# Patient Record
Sex: FEMALE | Race: BLACK OR AFRICAN AMERICAN | Employment: OTHER | URBAN - METROPOLITAN AREA
[De-identification: names, ages, dates, MRNs, and addresses within clinical notes are randomized per-mention and may not be internally consistent; named-entity substitution may affect disease eponyms.]

---

## 2017-02-21 ENCOUNTER — OFFICE VISIT (OUTPATIENT)
Dept: INTERNAL MEDICINE CLINIC | Age: 82
End: 2017-02-21

## 2017-02-21 VITALS
BODY MASS INDEX: 23.78 KG/M2 | DIASTOLIC BLOOD PRESSURE: 74 MMHG | HEIGHT: 66 IN | OXYGEN SATURATION: 97 % | HEART RATE: 84 BPM | SYSTOLIC BLOOD PRESSURE: 120 MMHG | WEIGHT: 148 LBS

## 2017-02-21 DIAGNOSIS — E55.9 VITAMIN D DEFICIENCY: ICD-10-CM

## 2017-02-21 DIAGNOSIS — J30.89 OTHER ALLERGIC RHINITIS: ICD-10-CM

## 2017-02-21 DIAGNOSIS — D69.6 THROMBOCYTOPENIA (HCC): ICD-10-CM

## 2017-02-21 DIAGNOSIS — R43.1 ABNORMAL SMELL: ICD-10-CM

## 2017-02-21 DIAGNOSIS — E78.2 MIXED HYPERLIPIDEMIA: ICD-10-CM

## 2017-02-21 DIAGNOSIS — I10 HYPERTENSION GOAL BP (BLOOD PRESSURE) < 130/80: Primary | ICD-10-CM

## 2017-02-21 PROCEDURE — G8427 DOCREV CUR MEDS BY ELIG CLIN: HCPCS | Performed by: INTERNAL MEDICINE

## 2017-02-21 PROCEDURE — 1036F TOBACCO NON-USER: CPT | Performed by: INTERNAL MEDICINE

## 2017-02-21 PROCEDURE — 1090F PRES/ABSN URINE INCON ASSESS: CPT | Performed by: INTERNAL MEDICINE

## 2017-02-21 PROCEDURE — G8420 CALC BMI NORM PARAMETERS: HCPCS | Performed by: INTERNAL MEDICINE

## 2017-02-21 PROCEDURE — G8484 FLU IMMUNIZE NO ADMIN: HCPCS | Performed by: INTERNAL MEDICINE

## 2017-02-21 PROCEDURE — G0444 DEPRESSION SCREEN ANNUAL: HCPCS | Performed by: INTERNAL MEDICINE

## 2017-02-21 PROCEDURE — 99214 OFFICE O/P EST MOD 30 MIN: CPT | Performed by: INTERNAL MEDICINE

## 2017-02-21 PROCEDURE — 1123F ACP DISCUSS/DSCN MKR DOCD: CPT | Performed by: INTERNAL MEDICINE

## 2017-02-21 PROCEDURE — 4040F PNEUMOC VAC/ADMIN/RCVD: CPT | Performed by: INTERNAL MEDICINE

## 2017-02-21 PROCEDURE — G8400 PT W/DXA NO RESULTS DOC: HCPCS | Performed by: INTERNAL MEDICINE

## 2017-02-21 RX ORDER — PRAVASTATIN SODIUM 40 MG
40 TABLET ORAL NIGHTLY
Qty: 90 TABLET | Refills: 0 | Status: SHIPPED | OUTPATIENT
Start: 2017-02-21 | End: 2017-05-23 | Stop reason: SDUPTHER

## 2017-02-21 RX ORDER — MULTIVIT-MIN/IRON/FOLIC ACID/K 18-600-40
1 CAPSULE ORAL DAILY
Qty: 90 CAPSULE | Refills: 0 | COMMUNITY
Start: 2017-02-21 | End: 2017-05-23 | Stop reason: SDUPTHER

## 2017-02-21 RX ORDER — FLUTICASONE PROPIONATE 50 MCG
2 SPRAY, SUSPENSION (ML) NASAL DAILY PRN
Qty: 1 BOTTLE | Refills: 3 | Status: SHIPPED | OUTPATIENT
Start: 2017-02-21 | End: 2018-03-09 | Stop reason: SDUPTHER

## 2017-02-21 ASSESSMENT — PATIENT HEALTH QUESTIONNAIRE - PHQ9
4. FEELING TIRED OR HAVING LITTLE ENERGY: 1
SUM OF ALL RESPONSES TO PHQ QUESTIONS 1-9: 12
8. MOVING OR SPEAKING SO SLOWLY THAT OTHER PEOPLE COULD HAVE NOTICED. OR THE OPPOSITE, BEING SO FIGETY OR RESTLESS THAT YOU HAVE BEEN MOVING AROUND A LOT MORE THAN USUAL: 1
1. LITTLE INTEREST OR PLEASURE IN DOING THINGS: 3
3. TROUBLE FALLING OR STAYING ASLEEP: 1
7. TROUBLE CONCENTRATING ON THINGS, SUCH AS READING THE NEWSPAPER OR WATCHING TELEVISION: 1
6. FEELING BAD ABOUT YOURSELF - OR THAT YOU ARE A FAILURE OR HAVE LET YOURSELF OR YOUR FAMILY DOWN: 1
2. FEELING DOWN, DEPRESSED OR HOPELESS: 3
5. POOR APPETITE OR OVEREATING: 1
10. IF YOU CHECKED OFF ANY PROBLEMS, HOW DIFFICULT HAVE THESE PROBLEMS MADE IT FOR YOU TO DO YOUR WORK, TAKE CARE OF THINGS AT HOME, OR GET ALONG WITH OTHER PEOPLE: 1
SUM OF ALL RESPONSES TO PHQ9 QUESTIONS 1 & 2: 6
9. THOUGHTS THAT YOU WOULD BE BETTER OFF DEAD, OR OF HURTING YOURSELF: 0

## 2017-05-15 DIAGNOSIS — E78.2 MIXED HYPERLIPIDEMIA: ICD-10-CM

## 2017-05-15 DIAGNOSIS — I10 HYPERTENSION GOAL BP (BLOOD PRESSURE) < 130/80: ICD-10-CM

## 2017-05-15 DIAGNOSIS — D69.6 THROMBOCYTOPENIA (HCC): ICD-10-CM

## 2017-05-15 LAB
ALBUMIN SERPL-MCNC: 4.3 G/DL (ref 3.4–5)
ALP BLD-CCNC: 77 U/L (ref 40–129)
ALT SERPL-CCNC: 12 U/L (ref 10–40)
ANION GAP SERPL CALCULATED.3IONS-SCNC: 15 MMOL/L (ref 3–16)
AST SERPL-CCNC: 17 U/L (ref 15–37)
BASOPHILS ABSOLUTE: 0 K/UL (ref 0–0.2)
BASOPHILS RELATIVE PERCENT: 0.3 %
BILIRUB SERPL-MCNC: 0.7 MG/DL (ref 0–1)
BILIRUBIN DIRECT: <0.2 MG/DL (ref 0–0.3)
BILIRUBIN, INDIRECT: ABNORMAL MG/DL (ref 0–1)
BUN BLDV-MCNC: 13 MG/DL (ref 7–20)
CALCIUM SERPL-MCNC: 9.1 MG/DL (ref 8.3–10.6)
CHLORIDE BLD-SCNC: 104 MMOL/L (ref 99–110)
CHOLESTEROL, TOTAL: 173 MG/DL (ref 0–199)
CO2: 25 MMOL/L (ref 21–32)
CREAT SERPL-MCNC: 1 MG/DL (ref 0.6–1.2)
EOSINOPHILS ABSOLUTE: 0.1 K/UL (ref 0–0.6)
EOSINOPHILS RELATIVE PERCENT: 1.6 %
GFR AFRICAN AMERICAN: >60
GFR NON-AFRICAN AMERICAN: 53
GLUCOSE BLD-MCNC: 93 MG/DL (ref 70–99)
HCT VFR BLD CALC: 39.4 % (ref 36–48)
HDLC SERPL-MCNC: 60 MG/DL (ref 40–60)
HEMOGLOBIN: 12.2 G/DL (ref 12–16)
LDL CHOLESTEROL CALCULATED: 104 MG/DL
LYMPHOCYTES ABSOLUTE: 1.2 K/UL (ref 1–5.1)
LYMPHOCYTES RELATIVE PERCENT: 28.5 %
MCH RBC QN AUTO: 25.6 PG (ref 26–34)
MCHC RBC AUTO-ENTMCNC: 31 G/DL (ref 31–36)
MCV RBC AUTO: 82.7 FL (ref 80–100)
MONOCYTES ABSOLUTE: 0.3 K/UL (ref 0–1.3)
MONOCYTES RELATIVE PERCENT: 8.4 %
NEUTROPHILS ABSOLUTE: 2.5 K/UL (ref 1.7–7.7)
NEUTROPHILS RELATIVE PERCENT: 61.2 %
PDW BLD-RTO: 14.5 % (ref 12.4–15.4)
PLATELET # BLD: 107 K/UL (ref 135–450)
PMV BLD AUTO: 11 FL (ref 5–10.5)
POTASSIUM SERPL-SCNC: 4.4 MMOL/L (ref 3.5–5.1)
RBC # BLD: 4.76 M/UL (ref 4–5.2)
SODIUM BLD-SCNC: 144 MMOL/L (ref 136–145)
TOTAL PROTEIN: 6.3 G/DL (ref 6.4–8.2)
TRIGL SERPL-MCNC: 47 MG/DL (ref 0–150)
VLDLC SERPL CALC-MCNC: 9 MG/DL
WBC # BLD: 4 K/UL (ref 4–11)

## 2017-05-23 ENCOUNTER — OFFICE VISIT (OUTPATIENT)
Dept: INTERNAL MEDICINE CLINIC | Age: 82
End: 2017-05-23

## 2017-05-23 VITALS
BODY MASS INDEX: 24.59 KG/M2 | SYSTOLIC BLOOD PRESSURE: 130 MMHG | HEIGHT: 66 IN | HEART RATE: 67 BPM | OXYGEN SATURATION: 98 % | DIASTOLIC BLOOD PRESSURE: 80 MMHG | WEIGHT: 153 LBS

## 2017-05-23 DIAGNOSIS — E78.49 OTHER HYPERLIPIDEMIA: ICD-10-CM

## 2017-05-23 DIAGNOSIS — J30.89 OTHER ALLERGIC RHINITIS: ICD-10-CM

## 2017-05-23 DIAGNOSIS — R53.83 OTHER FATIGUE: ICD-10-CM

## 2017-05-23 DIAGNOSIS — D69.6 THROMBOCYTOPENIA (HCC): ICD-10-CM

## 2017-05-23 DIAGNOSIS — E55.9 VITAMIN D DEFICIENCY: ICD-10-CM

## 2017-05-23 DIAGNOSIS — I10 HYPERTENSION GOAL BP (BLOOD PRESSURE) < 130/80: Primary | ICD-10-CM

## 2017-05-23 DIAGNOSIS — R39.15 URINARY URGENCY: ICD-10-CM

## 2017-05-23 DIAGNOSIS — R42 DIZZINESS: ICD-10-CM

## 2017-05-23 LAB
BILIRUBIN, POC: NORMAL
BLOOD URINE, POC: NORMAL
CLARITY, POC: CLEAR
COLOR, POC: YELLOW
GLUCOSE URINE, POC: NORMAL
KETONES, POC: NORMAL
LEUKOCYTE EST, POC: NORMAL
NITRITE, POC: NORMAL
PH, POC: 6
PROTEIN, POC: NORMAL
SPECIFIC GRAVITY, POC: 1.02
UROBILINOGEN, POC: 0.2

## 2017-05-23 PROCEDURE — 81002 URINALYSIS NONAUTO W/O SCOPE: CPT | Performed by: INTERNAL MEDICINE

## 2017-05-23 PROCEDURE — G8427 DOCREV CUR MEDS BY ELIG CLIN: HCPCS | Performed by: INTERNAL MEDICINE

## 2017-05-23 PROCEDURE — 4040F PNEUMOC VAC/ADMIN/RCVD: CPT | Performed by: INTERNAL MEDICINE

## 2017-05-23 PROCEDURE — 1036F TOBACCO NON-USER: CPT | Performed by: INTERNAL MEDICINE

## 2017-05-23 PROCEDURE — G8400 PT W/DXA NO RESULTS DOC: HCPCS | Performed by: INTERNAL MEDICINE

## 2017-05-23 PROCEDURE — G8420 CALC BMI NORM PARAMETERS: HCPCS | Performed by: INTERNAL MEDICINE

## 2017-05-23 PROCEDURE — 1090F PRES/ABSN URINE INCON ASSESS: CPT | Performed by: INTERNAL MEDICINE

## 2017-05-23 PROCEDURE — 1123F ACP DISCUSS/DSCN MKR DOCD: CPT | Performed by: INTERNAL MEDICINE

## 2017-05-23 PROCEDURE — 99214 OFFICE O/P EST MOD 30 MIN: CPT | Performed by: INTERNAL MEDICINE

## 2017-05-23 RX ORDER — PRAVASTATIN SODIUM 40 MG
40 TABLET ORAL NIGHTLY
Qty: 90 TABLET | Refills: 0 | Status: SHIPPED | OUTPATIENT
Start: 2017-05-23 | End: 2017-08-02 | Stop reason: SDUPTHER

## 2017-05-23 RX ORDER — AMLODIPINE BESYLATE 10 MG/1
10 TABLET ORAL DAILY
Qty: 90 TABLET | Refills: 0 | Status: SHIPPED | OUTPATIENT
Start: 2017-05-23 | End: 2017-08-02 | Stop reason: SDUPTHER

## 2017-05-23 RX ORDER — VALSARTAN 320 MG/1
320 TABLET ORAL DAILY
Qty: 90 TABLET | Refills: 0 | Status: SHIPPED | OUTPATIENT
Start: 2017-05-23 | End: 2017-08-02 | Stop reason: SDUPTHER

## 2017-05-23 RX ORDER — MULTIVIT-MIN/IRON/FOLIC ACID/K 18-600-40
1 CAPSULE ORAL DAILY
Qty: 90 CAPSULE | Refills: 0 | COMMUNITY
Start: 2017-05-23 | End: 2017-08-02 | Stop reason: SDUPTHER

## 2017-08-02 ENCOUNTER — OFFICE VISIT (OUTPATIENT)
Dept: INTERNAL MEDICINE CLINIC | Age: 82
End: 2017-08-02

## 2017-08-02 VITALS
DIASTOLIC BLOOD PRESSURE: 80 MMHG | OXYGEN SATURATION: 98 % | BODY MASS INDEX: 25.07 KG/M2 | SYSTOLIC BLOOD PRESSURE: 130 MMHG | HEIGHT: 66 IN | HEART RATE: 80 BPM | TEMPERATURE: 98.8 F | WEIGHT: 156 LBS

## 2017-08-02 DIAGNOSIS — E78.5 HYPERLIPIDEMIA LDL GOAL <100: ICD-10-CM

## 2017-08-02 DIAGNOSIS — R91.1 PULMONARY NODULE: ICD-10-CM

## 2017-08-02 DIAGNOSIS — J30.89 ALLERGIC RHINITIS DUE TO OTHER ALLERGEN: ICD-10-CM

## 2017-08-02 DIAGNOSIS — R42 DIZZINESS: ICD-10-CM

## 2017-08-02 DIAGNOSIS — I10 ESSENTIAL HYPERTENSION: Primary | ICD-10-CM

## 2017-08-02 DIAGNOSIS — R53.83 FATIGUE, UNSPECIFIED TYPE: ICD-10-CM

## 2017-08-02 DIAGNOSIS — D69.6 THROMBOCYTOPENIA (HCC): ICD-10-CM

## 2017-08-02 DIAGNOSIS — E55.9 VITAMIN D DEFICIENCY: ICD-10-CM

## 2017-08-02 DIAGNOSIS — M54.2 NECK PAIN: ICD-10-CM

## 2017-08-02 LAB
ANION GAP SERPL CALCULATED.3IONS-SCNC: 14 MMOL/L (ref 3–16)
BILIRUBIN URINE: NEGATIVE
BLOOD, URINE: NEGATIVE
BUN BLDV-MCNC: 10 MG/DL (ref 7–20)
CALCIUM SERPL-MCNC: 9.4 MG/DL (ref 8.3–10.6)
CHLORIDE BLD-SCNC: 105 MMOL/L (ref 99–110)
CLARITY: CLEAR
CO2: 25 MMOL/L (ref 21–32)
COLOR: YELLOW
CREAT SERPL-MCNC: 0.8 MG/DL (ref 0.6–1.2)
FOLATE: 14.63 NG/ML (ref 4.78–24.2)
GFR AFRICAN AMERICAN: >60
GFR NON-AFRICAN AMERICAN: >60
GLUCOSE BLD-MCNC: 92 MG/DL (ref 70–99)
GLUCOSE URINE: NEGATIVE MG/DL
HCT VFR BLD CALC: 39.1 % (ref 36–48)
HEMOGLOBIN: 12.5 G/DL (ref 12–16)
KETONES, URINE: NEGATIVE MG/DL
LEUKOCYTE ESTERASE, URINE: NEGATIVE
MCH RBC QN AUTO: 26.5 PG (ref 26–34)
MCHC RBC AUTO-ENTMCNC: 32 G/DL (ref 31–36)
MCV RBC AUTO: 82.9 FL (ref 80–100)
MICROSCOPIC EXAMINATION: NORMAL
NITRITE, URINE: NEGATIVE
PDW BLD-RTO: 14.6 % (ref 12.4–15.4)
PH UA: 5.5
PLATELET # BLD: 112 K/UL (ref 135–450)
PMV BLD AUTO: 10.8 FL (ref 5–10.5)
POTASSIUM SERPL-SCNC: 4.6 MMOL/L (ref 3.5–5.1)
PROTEIN UA: NEGATIVE MG/DL
RBC # BLD: 4.71 M/UL (ref 4–5.2)
SODIUM BLD-SCNC: 144 MMOL/L (ref 136–145)
SPECIFIC GRAVITY UA: 1.02
TSH REFLEX: 1.29 UIU/ML (ref 0.27–4.2)
URINE TYPE: NORMAL
UROBILINOGEN, URINE: 0.2 E.U./DL
VITAMIN B-12: 729 PG/ML (ref 211–911)
VITAMIN D 25-HYDROXY: 36.3 NG/ML
WBC # BLD: 5.2 K/UL (ref 4–11)

## 2017-08-02 PROCEDURE — 1036F TOBACCO NON-USER: CPT | Performed by: INTERNAL MEDICINE

## 2017-08-02 PROCEDURE — 1090F PRES/ABSN URINE INCON ASSESS: CPT | Performed by: INTERNAL MEDICINE

## 2017-08-02 PROCEDURE — G8427 DOCREV CUR MEDS BY ELIG CLIN: HCPCS | Performed by: INTERNAL MEDICINE

## 2017-08-02 PROCEDURE — 1123F ACP DISCUSS/DSCN MKR DOCD: CPT | Performed by: INTERNAL MEDICINE

## 2017-08-02 PROCEDURE — G8400 PT W/DXA NO RESULTS DOC: HCPCS | Performed by: INTERNAL MEDICINE

## 2017-08-02 PROCEDURE — 4040F PNEUMOC VAC/ADMIN/RCVD: CPT | Performed by: INTERNAL MEDICINE

## 2017-08-02 PROCEDURE — G8419 CALC BMI OUT NRM PARAM NOF/U: HCPCS | Performed by: INTERNAL MEDICINE

## 2017-08-02 PROCEDURE — 99214 OFFICE O/P EST MOD 30 MIN: CPT | Performed by: INTERNAL MEDICINE

## 2017-08-02 RX ORDER — AMLODIPINE BESYLATE 10 MG/1
10 TABLET ORAL DAILY
Qty: 90 TABLET | Refills: 0 | Status: ON HOLD | OUTPATIENT
Start: 2017-08-02 | End: 2017-10-24

## 2017-08-02 RX ORDER — NAPROXEN 500 MG/1
500 TABLET ORAL 2 TIMES DAILY PRN
Qty: 60 TABLET | Refills: 0 | Status: SHIPPED | OUTPATIENT
Start: 2017-08-02 | End: 2018-09-29 | Stop reason: CLARIF

## 2017-08-02 RX ORDER — MULTIVIT-MIN/IRON/FOLIC ACID/K 18-600-40
1 CAPSULE ORAL DAILY
Qty: 90 CAPSULE | Refills: 0 | Status: SHIPPED | OUTPATIENT
Start: 2017-08-02 | End: 2017-11-28 | Stop reason: SDUPTHER

## 2017-08-02 RX ORDER — PRAVASTATIN SODIUM 40 MG
40 TABLET ORAL NIGHTLY
Qty: 90 TABLET | Refills: 0 | Status: SHIPPED | OUTPATIENT
Start: 2017-08-02 | End: 2017-11-28 | Stop reason: SDUPTHER

## 2017-08-02 RX ORDER — MECLIZINE HCL 12.5 MG/1
12.5 TABLET ORAL 3 TIMES DAILY PRN
Qty: 30 TABLET | Refills: 0 | Status: SHIPPED | OUTPATIENT
Start: 2017-08-02 | End: 2017-08-24 | Stop reason: DRUGHIGH

## 2017-08-02 RX ORDER — VALSARTAN 320 MG/1
320 TABLET ORAL DAILY
Qty: 90 TABLET | Refills: 0 | Status: ON HOLD | OUTPATIENT
Start: 2017-08-02 | End: 2017-10-24 | Stop reason: HOSPADM

## 2017-08-24 ENCOUNTER — OFFICE VISIT (OUTPATIENT)
Dept: INTERNAL MEDICINE CLINIC | Age: 82
End: 2017-08-24

## 2017-08-24 VITALS
BODY MASS INDEX: 23.78 KG/M2 | HEIGHT: 66 IN | DIASTOLIC BLOOD PRESSURE: 78 MMHG | HEART RATE: 73 BPM | WEIGHT: 148 LBS | SYSTOLIC BLOOD PRESSURE: 130 MMHG | TEMPERATURE: 98.4 F | OXYGEN SATURATION: 98 %

## 2017-08-24 DIAGNOSIS — E78.5 HYPERLIPIDEMIA LDL GOAL <100: ICD-10-CM

## 2017-08-24 DIAGNOSIS — E55.9 VITAMIN D DEFICIENCY: ICD-10-CM

## 2017-08-24 DIAGNOSIS — I10 ESSENTIAL HYPERTENSION: Primary | ICD-10-CM

## 2017-08-24 DIAGNOSIS — R42 DIZZINESS: ICD-10-CM

## 2017-08-24 DIAGNOSIS — R53.83 FATIGUE, UNSPECIFIED TYPE: ICD-10-CM

## 2017-08-24 DIAGNOSIS — D69.6 THROMBOCYTOPENIA (HCC): ICD-10-CM

## 2017-08-24 DIAGNOSIS — R91.1 PULMONARY NODULE: ICD-10-CM

## 2017-08-24 DIAGNOSIS — M54.2 NECK PAIN: ICD-10-CM

## 2017-08-24 PROCEDURE — 4040F PNEUMOC VAC/ADMIN/RCVD: CPT | Performed by: INTERNAL MEDICINE

## 2017-08-24 PROCEDURE — 1090F PRES/ABSN URINE INCON ASSESS: CPT | Performed by: INTERNAL MEDICINE

## 2017-08-24 PROCEDURE — G8427 DOCREV CUR MEDS BY ELIG CLIN: HCPCS | Performed by: INTERNAL MEDICINE

## 2017-08-24 PROCEDURE — G8420 CALC BMI NORM PARAMETERS: HCPCS | Performed by: INTERNAL MEDICINE

## 2017-08-24 PROCEDURE — G8400 PT W/DXA NO RESULTS DOC: HCPCS | Performed by: INTERNAL MEDICINE

## 2017-08-24 PROCEDURE — 1123F ACP DISCUSS/DSCN MKR DOCD: CPT | Performed by: INTERNAL MEDICINE

## 2017-08-24 PROCEDURE — 99214 OFFICE O/P EST MOD 30 MIN: CPT | Performed by: INTERNAL MEDICINE

## 2017-08-24 PROCEDURE — 1036F TOBACCO NON-USER: CPT | Performed by: INTERNAL MEDICINE

## 2017-08-24 RX ORDER — MECLIZINE HYDROCHLORIDE 25 MG/1
25 TABLET ORAL 3 TIMES DAILY PRN
Qty: 30 TABLET | Refills: 0 | Status: SHIPPED | OUTPATIENT
Start: 2017-08-24 | End: 2018-06-12

## 2017-09-06 ENCOUNTER — HOSPITAL ENCOUNTER (OUTPATIENT)
Dept: CT IMAGING | Age: 82
Discharge: HOME | End: 2017-09-06
Attending: INTERNAL MEDICINE | Admitting: INTERNAL MEDICINE

## 2017-09-06 DIAGNOSIS — R91.1 SOLITARY PULMONARY NODULE: ICD-10-CM

## 2017-09-06 DIAGNOSIS — R91.1 PULMONARY NODULE: ICD-10-CM

## 2017-10-22 PROBLEM — R42 VERTIGO: Status: ACTIVE | Noted: 2017-10-22
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
## 2017-10-23 PROBLEM — R26.89 LOSS OF BALANCE: Status: ACTIVE | Noted: 2017-10-23

## 2017-10-25 ENCOUNTER — TELEPHONE (OUTPATIENT)
Dept: INTERNAL MEDICINE CLINIC | Age: 82
End: 2017-10-25

## 2017-10-25 NOTE — TELEPHONE ENCOUNTER
CALLED AND TO CHECK UP ON PT. S/P HOSPITALIZATION  STATES DIZZINESS IMPROVING  ADVISED F/U POST HOSPITALIZATION VISIT  PT VERBALIZED UNDERSTANDING

## 2017-11-28 ENCOUNTER — OFFICE VISIT (OUTPATIENT)
Dept: INTERNAL MEDICINE CLINIC | Age: 82
End: 2017-11-28

## 2017-11-28 VITALS
HEIGHT: 66 IN | SYSTOLIC BLOOD PRESSURE: 130 MMHG | DIASTOLIC BLOOD PRESSURE: 80 MMHG | TEMPERATURE: 97.6 F | BODY MASS INDEX: 24.27 KG/M2 | HEART RATE: 78 BPM | WEIGHT: 151 LBS

## 2017-11-28 DIAGNOSIS — E78.49 OTHER HYPERLIPIDEMIA: ICD-10-CM

## 2017-11-28 DIAGNOSIS — E55.9 VITAMIN D DEFICIENCY: ICD-10-CM

## 2017-11-28 DIAGNOSIS — R91.1 PULMONARY NODULE: ICD-10-CM

## 2017-11-28 DIAGNOSIS — M54.2 NECK PAIN: ICD-10-CM

## 2017-11-28 DIAGNOSIS — R39.89 ABNORMAL URINE COLOR: ICD-10-CM

## 2017-11-28 DIAGNOSIS — D69.6 THROMBOCYTOPENIA (HCC): ICD-10-CM

## 2017-11-28 DIAGNOSIS — Z23 NEED FOR IMMUNIZATION AGAINST INFLUENZA: ICD-10-CM

## 2017-11-28 DIAGNOSIS — M79.89 LEG SWELLING: ICD-10-CM

## 2017-11-28 DIAGNOSIS — K80.20 GALLSTONES: ICD-10-CM

## 2017-11-28 DIAGNOSIS — I10 ESSENTIAL HYPERTENSION: Primary | ICD-10-CM

## 2017-11-28 LAB
BILIRUBIN URINE: NEGATIVE
BILIRUBIN, POC: NORMAL
BLOOD URINE, POC: NORMAL
BLOOD, URINE: NEGATIVE
CLARITY, POC: NORMAL
CLARITY: CLEAR
COLOR, POC: YELLOW
COLOR: YELLOW
GLUCOSE URINE, POC: NEGATIVE
GLUCOSE URINE: NEGATIVE MG/DL
KETONES, POC: NEGATIVE
KETONES, URINE: NEGATIVE MG/DL
LEUKOCYTE EST, POC: NEGATIVE
LEUKOCYTE ESTERASE, URINE: NEGATIVE
MICROSCOPIC EXAMINATION: NORMAL
NITRITE, POC: NEGATIVE
NITRITE, URINE: NEGATIVE
PH UA: 5.5
PH, POC: 5
PROTEIN UA: NEGATIVE MG/DL
PROTEIN, POC: NEGATIVE
SPECIFIC GRAVITY UA: 1.02
SPECIFIC GRAVITY, POC: 1.3
URINE TYPE: NORMAL
UROBILINOGEN, POC: 0.2
UROBILINOGEN, URINE: 0.2 E.U./DL

## 2017-11-28 PROCEDURE — G8484 FLU IMMUNIZE NO ADMIN: HCPCS | Performed by: INTERNAL MEDICINE

## 2017-11-28 PROCEDURE — G8427 DOCREV CUR MEDS BY ELIG CLIN: HCPCS | Performed by: INTERNAL MEDICINE

## 2017-11-28 PROCEDURE — 99214 OFFICE O/P EST MOD 30 MIN: CPT | Performed by: INTERNAL MEDICINE

## 2017-11-28 PROCEDURE — 1036F TOBACCO NON-USER: CPT | Performed by: INTERNAL MEDICINE

## 2017-11-28 PROCEDURE — G8420 CALC BMI NORM PARAMETERS: HCPCS | Performed by: INTERNAL MEDICINE

## 2017-11-28 PROCEDURE — G8400 PT W/DXA NO RESULTS DOC: HCPCS | Performed by: INTERNAL MEDICINE

## 2017-11-28 PROCEDURE — 1090F PRES/ABSN URINE INCON ASSESS: CPT | Performed by: INTERNAL MEDICINE

## 2017-11-28 PROCEDURE — 4040F PNEUMOC VAC/ADMIN/RCVD: CPT | Performed by: INTERNAL MEDICINE

## 2017-11-28 PROCEDURE — 90662 IIV NO PRSV INCREASED AG IM: CPT | Performed by: INTERNAL MEDICINE

## 2017-11-28 PROCEDURE — 1123F ACP DISCUSS/DSCN MKR DOCD: CPT | Performed by: INTERNAL MEDICINE

## 2017-11-28 PROCEDURE — G0008 ADMIN INFLUENZA VIRUS VAC: HCPCS | Performed by: INTERNAL MEDICINE

## 2017-11-28 PROCEDURE — 81002 URINALYSIS NONAUTO W/O SCOPE: CPT | Performed by: INTERNAL MEDICINE

## 2017-11-28 RX ORDER — MULTIVIT-MIN/IRON/FOLIC ACID/K 18-600-40
1 CAPSULE ORAL DAILY
Qty: 90 CAPSULE | Refills: 0 | Status: SHIPPED | OUTPATIENT
Start: 2017-11-28 | End: 2018-03-09 | Stop reason: SDUPTHER

## 2017-11-28 RX ORDER — AMLODIPINE BESYLATE 5 MG/1
5 TABLET ORAL DAILY
Qty: 90 TABLET | Refills: 0 | Status: SHIPPED | OUTPATIENT
Start: 2017-11-28 | End: 2018-03-09 | Stop reason: SDUPTHER

## 2017-11-28 RX ORDER — PRAVASTATIN SODIUM 40 MG
40 TABLET ORAL NIGHTLY
Qty: 90 TABLET | Refills: 0 | Status: SHIPPED | OUTPATIENT
Start: 2017-11-28 | End: 2018-03-09 | Stop reason: SDUPTHER

## 2017-11-28 RX ORDER — VALSARTAN 160 MG/1
160 TABLET ORAL DAILY
Qty: 90 TABLET | Refills: 0 | Status: SHIPPED | OUTPATIENT
Start: 2017-11-28 | End: 2018-03-09 | Stop reason: SDUPTHER

## 2017-11-28 RX ORDER — AMLODIPINE BESYLATE 5 MG/1
5 TABLET ORAL DAILY
Qty: 90 TABLET | Refills: 0 | Status: SHIPPED | OUTPATIENT
Start: 2017-11-28 | End: 2017-11-28 | Stop reason: SDUPTHER

## 2017-11-28 RX ORDER — VALSARTAN 160 MG/1
160 TABLET ORAL DAILY
Qty: 90 TABLET | Refills: 0 | Status: SHIPPED | OUTPATIENT
Start: 2017-11-28 | End: 2017-11-28 | Stop reason: SDUPTHER

## 2017-11-28 NOTE — PROGRESS NOTES
Vaccine Information Sheet, \"Influenza - Inactivated\"  given to Berenice Curtis, or parent/legal guardian of  Berenice Curtis and verbalized understanding. Patient responses:    Have you ever had a reaction to a flu vaccine? NO  Are you able to eat eggs without adverse effects? YES   Do you have any current illness? NO  Have you ever had Guillian [place] Syndrome? NO    Flu vaccine given per order. Please see immunization tab.

## 2017-11-28 NOTE — PATIENT INSTRUCTIONS
TAKE MED AS ADVISED    DIET/ EXERCISE.     FOLLOW UP WITHIN 3 MONTHS / AS NEEDED    FOLLOW UP FOR FASTING LABS

## 2017-11-28 NOTE — PROGRESS NOTES
SUBJECTIVE:  Xavier Gomez is a 81 y.o. female [address]   Chief Complaint   Patient presents with    Follow-up     patient here for followup, hypertension    Hyperlipidemia    Other        PT HERE FOR EVAL    HTN - TAKING MEDS. NO HEADACHE , OCC DIZZINESS - NOT AT THIS TIME  HLP - MED Yes / DIET Yes COMPLIANCE . OCC MUSCLE CRAMPS / NO MUSCLE ACHES  VIT D DEF - TAKING MED. LABS D/W PT  NECK PAIN - IMPROVED. INTERMITTENT. DULL ACHE. PAIN SCALE 3/10. DENIES TRAUMA. NO NUMBNESS / NO TINGLING  PULM. NODULE - STABLE. CT D/W PT  THROMBOCYTOPENIA - CHRONIC.  LABS D/W PT. NO BRUISING  C/O LEG SWELLING - DELPHINE. INTERMITTENT. DENIES PAIN. DENIES REDNESS, NO WARMTH. DENIES SOB, NO ORTHOPNEA, NO PND  GALLSTONES - INCIDENTAL FINDING ON CT. DENIES ABD PAIN  C/O CHANGE IN URINE COLOR ? DURATION. No DYSURIA, ? FREQ, ? URGENCY, No HEMATURIA   NEEDS FLU VACCINE  FATIGUE - RESOLVED     DENIES CP, No SOB, No PALPITATIONS, No COUGH  No ABD PAIN, No N/V, No DIARRHEA, No CONSTIPATION, No MELENA, No HEMATOCHEZIA. PMH: REVIEWED    PSH: REVIEWED:    ALLERGY:  Nabumetone    MEDS: REVIEWED  Medication Sig Taking?   acetaminophen (TYLENOL) 325 MG tablet Take 2 tablets by mouth every 4 hours as needed for Pain Yes   valsartan (DIOVAN) 160 MG tablet Take 1 tablet by mouth daily Yes   amLODIPine (NORVASC) 5 MG tablet Take 1 tablet by mouth daily Yes   pravastatin (PRAVACHOL) 40 MG tablet Take 1 tablet by mouth nightly Yes   Cholecalciferol (VITAMIN D) 2000 units CAPS capsule Take 1 capsule by mouth daily Yes   fluticasone (FLONASE) 50 MCG/ACT nasal spray 2 sprays by Nasal route daily as needed for Rhinitis Yes   fexofenadine (ALLEGRA) 180 MG tablet Take 1 tablet by mouth daily as needed (AS NEEDED) Yes   aspirin 81 MG tablet Take 81 mg by mouth daily Yes   Multiple Vitamins-Minerals (THERAPEUTIC MULTIVITAMIN-MINERALS) tablet Take 1 tablet by mouth daily Yes   buPROPion (WELLBUTRIN SR) 100 MG SR tablet Take 100 mg by mouth daily.  Yes COUNSELLED. STABLE. READDRESS F/U STUDY AS RECOMMENDED  MAKE CHANGES AS NEEDED. 6. Thrombocytopenia ([address]  COUNSELLED. CHRONIC. ASYMPTOMATIC. MONITOR  MAKE CHANGES AS NEEDED. 7. Leg swelling  COUNSELLED. MILD EDEMA  ADVISED LOW NA+ DIET. ELEVATE LEGS. MONITOR FOR S/E REL TO NORVAS. MAKE CHANGES AS NEEDED. 8. Gallstones  COUNSELLED. DEFERRED SURGERY EVAL. MONITOR  MAKE CHANGES AS NEEDED. 9. Abnormal urine color / abn ua  COUNSELLED. LAB TO EVAL  C $ S . MAKE CHANGES AS NEEDED BASED ON RESULT. 10. Need for immunization against influenza  COUNSELLED. S/E D/W PT. FLU VACCINE GIVEN. PT TOLERATED. ME ADMIN INFLUENZA VIRUS VAC  INFLUENZA, HIGH DOSE, 65 YRS +, IM, PF, PREFILL SYR, 0.5ML (FLUZONE HD)             PT DEFERRED PREVNAR      Mala received counseling on the following healthy behaviors: nutrition, exercise and medication adherence    Patient given educational materials on Hyperlipidemia, Nutrition, Exercise and Hypertension    I have instructed Thomas Bamberger to complete a self tracking handout on Blood Pressures  and Weights and instructed them to bring it with them to her next appointment. Discussed use, benefit, and side effects of prescribed medications. Barriers to medication compliance addressed. All patient questions answered. Pt voiced understanding.             MEDICATION SIDE EFFECTS D/W PATIENT    PT STABLE AT TIME OF D/C.    RETURN TO CLINIC WITHIN 3 MONTHS / PRN    FOLLOW UP FOR FASTING LABS

## 2017-11-30 LAB — URINE CULTURE, ROUTINE: NORMAL

## 2017-12-02 DIAGNOSIS — I10 ESSENTIAL HYPERTENSION: ICD-10-CM

## 2017-12-04 RX ORDER — AMLODIPINE BESYLATE 10 MG/1
TABLET ORAL
Qty: 90 TABLET | Refills: 0 | Status: SHIPPED | OUTPATIENT
Start: 2017-12-04 | End: 2018-03-09 | Stop reason: SDUPTHER

## 2017-12-04 RX ORDER — VALSARTAN 320 MG/1
TABLET ORAL
Qty: 90 TABLET | Refills: 0 | OUTPATIENT
Start: 2017-12-04

## 2017-12-14 DIAGNOSIS — E55.9 VITAMIN D DEFICIENCY: ICD-10-CM

## 2017-12-14 DIAGNOSIS — I10 ESSENTIAL HYPERTENSION: ICD-10-CM

## 2017-12-14 DIAGNOSIS — E78.49 OTHER HYPERLIPIDEMIA: ICD-10-CM

## 2017-12-14 DIAGNOSIS — D69.6 THROMBOCYTOPENIA (HCC): ICD-10-CM

## 2017-12-14 LAB
ALBUMIN SERPL-MCNC: 4.2 G/DL (ref 3.4–5)
ALP BLD-CCNC: 73 U/L (ref 40–129)
ALT SERPL-CCNC: 11 U/L (ref 10–40)
ANION GAP SERPL CALCULATED.3IONS-SCNC: 15 MMOL/L (ref 3–16)
AST SERPL-CCNC: 20 U/L (ref 15–37)
BASOPHILS ABSOLUTE: 0 K/UL (ref 0–0.2)
BASOPHILS RELATIVE PERCENT: 0.4 %
BILIRUB SERPL-MCNC: 1 MG/DL (ref 0–1)
BILIRUBIN DIRECT: <0.2 MG/DL (ref 0–0.3)
BILIRUBIN, INDIRECT: NORMAL MG/DL (ref 0–1)
BUN BLDV-MCNC: 10 MG/DL (ref 7–20)
CALCIUM SERPL-MCNC: 9.3 MG/DL (ref 8.3–10.6)
CHLORIDE BLD-SCNC: 103 MMOL/L (ref 99–110)
CHOLESTEROL, TOTAL: 172 MG/DL (ref 0–199)
CO2: 26 MMOL/L (ref 21–32)
CREAT SERPL-MCNC: 0.9 MG/DL (ref 0.6–1.2)
CREATININE URINE: 210.5 MG/DL (ref 28–259)
EOSINOPHILS ABSOLUTE: 0.1 K/UL (ref 0–0.6)
EOSINOPHILS RELATIVE PERCENT: 1 %
GFR AFRICAN AMERICAN: >60
GFR NON-AFRICAN AMERICAN: >60
GLUCOSE BLD-MCNC: 91 MG/DL (ref 70–99)
HCT VFR BLD CALC: 39.7 % (ref 36–48)
HDLC SERPL-MCNC: 53 MG/DL (ref 40–60)
HEMOGLOBIN: 12.6 G/DL (ref 12–16)
LDL CHOLESTEROL CALCULATED: 105 MG/DL
LYMPHOCYTES ABSOLUTE: 1.2 K/UL (ref 1–5.1)
LYMPHOCYTES RELATIVE PERCENT: 23.2 %
MCH RBC QN AUTO: 26.1 PG (ref 26–34)
MCHC RBC AUTO-ENTMCNC: 31.8 G/DL (ref 31–36)
MCV RBC AUTO: 82.2 FL (ref 80–100)
MICROALBUMIN UR-MCNC: <1.2 MG/DL
MICROALBUMIN/CREAT UR-RTO: NORMAL MG/G (ref 0–30)
MONOCYTES ABSOLUTE: 0.5 K/UL (ref 0–1.3)
MONOCYTES RELATIVE PERCENT: 9.3 %
NEUTROPHILS ABSOLUTE: 3.6 K/UL (ref 1.7–7.7)
NEUTROPHILS RELATIVE PERCENT: 66.1 %
PDW BLD-RTO: 14.1 % (ref 12.4–15.4)
PLATELET # BLD: 123 K/UL (ref 135–450)
PMV BLD AUTO: 10.4 FL (ref 5–10.5)
POTASSIUM SERPL-SCNC: 4.1 MMOL/L (ref 3.5–5.1)
RBC # BLD: 4.83 M/UL (ref 4–5.2)
SODIUM BLD-SCNC: 144 MMOL/L (ref 136–145)
TOTAL PROTEIN: 7 G/DL (ref 6.4–8.2)
TRIGL SERPL-MCNC: 71 MG/DL (ref 0–150)
VITAMIN D 25-HYDROXY: 35.5 NG/ML
VLDLC SERPL CALC-MCNC: 14 MG/DL
WBC # BLD: 5.4 K/UL (ref 4–11)

## 2018-03-09 ENCOUNTER — OFFICE VISIT (OUTPATIENT)
Dept: INTERNAL MEDICINE CLINIC | Age: 83
End: 2018-03-09

## 2018-03-09 VITALS
HEIGHT: 66 IN | DIASTOLIC BLOOD PRESSURE: 80 MMHG | HEART RATE: 70 BPM | SYSTOLIC BLOOD PRESSURE: 120 MMHG | OXYGEN SATURATION: 98 % | BODY MASS INDEX: 23.14 KG/M2 | WEIGHT: 144 LBS

## 2018-03-09 DIAGNOSIS — E78.49 OTHER HYPERLIPIDEMIA: ICD-10-CM

## 2018-03-09 DIAGNOSIS — M54.2 NECK PAIN: ICD-10-CM

## 2018-03-09 DIAGNOSIS — J30.89 OTHER ALLERGIC RHINITIS: ICD-10-CM

## 2018-03-09 DIAGNOSIS — R91.1 PULMONARY NODULE: ICD-10-CM

## 2018-03-09 DIAGNOSIS — K80.20 GALLSTONES: ICD-10-CM

## 2018-03-09 DIAGNOSIS — I10 ESSENTIAL HYPERTENSION: Primary | ICD-10-CM

## 2018-03-09 DIAGNOSIS — E55.9 VITAMIN D DEFICIENCY: ICD-10-CM

## 2018-03-09 DIAGNOSIS — D69.6 THROMBOCYTOPENIA (HCC): ICD-10-CM

## 2018-03-09 DIAGNOSIS — M79.89 LEG SWELLING: ICD-10-CM

## 2018-03-09 PROCEDURE — G8427 DOCREV CUR MEDS BY ELIG CLIN: HCPCS | Performed by: INTERNAL MEDICINE

## 2018-03-09 PROCEDURE — G8482 FLU IMMUNIZE ORDER/ADMIN: HCPCS | Performed by: INTERNAL MEDICINE

## 2018-03-09 PROCEDURE — 1036F TOBACCO NON-USER: CPT | Performed by: INTERNAL MEDICINE

## 2018-03-09 PROCEDURE — G8400 PT W/DXA NO RESULTS DOC: HCPCS | Performed by: INTERNAL MEDICINE

## 2018-03-09 PROCEDURE — 4040F PNEUMOC VAC/ADMIN/RCVD: CPT | Performed by: INTERNAL MEDICINE

## 2018-03-09 PROCEDURE — G8420 CALC BMI NORM PARAMETERS: HCPCS | Performed by: INTERNAL MEDICINE

## 2018-03-09 PROCEDURE — 1123F ACP DISCUSS/DSCN MKR DOCD: CPT | Performed by: INTERNAL MEDICINE

## 2018-03-09 PROCEDURE — 1090F PRES/ABSN URINE INCON ASSESS: CPT | Performed by: INTERNAL MEDICINE

## 2018-03-09 PROCEDURE — 99214 OFFICE O/P EST MOD 30 MIN: CPT | Performed by: INTERNAL MEDICINE

## 2018-03-09 RX ORDER — VALSARTAN 160 MG/1
160 TABLET ORAL DAILY
Qty: 90 TABLET | Refills: 0 | Status: SHIPPED | OUTPATIENT
Start: 2018-03-09 | End: 2018-06-12 | Stop reason: SDUPTHER

## 2018-03-09 RX ORDER — MULTIVIT-MIN/IRON/FOLIC ACID/K 18-600-40
1 CAPSULE ORAL DAILY
Qty: 90 CAPSULE | Refills: 0 | Status: SHIPPED | OUTPATIENT
Start: 2018-03-09 | End: 2018-06-12 | Stop reason: SDUPTHER

## 2018-03-09 RX ORDER — AMLODIPINE BESYLATE 5 MG/1
5 TABLET ORAL DAILY
Qty: 90 TABLET | Refills: 0 | Status: SHIPPED | OUTPATIENT
Start: 2018-03-09 | End: 2018-06-12 | Stop reason: SDUPTHER

## 2018-03-09 RX ORDER — FLUTICASONE PROPIONATE 50 MCG
SPRAY, SUSPENSION (ML) NASAL
Qty: 1 BOTTLE | Refills: 2 | Status: SHIPPED | OUTPATIENT
Start: 2018-03-09 | End: 2018-08-07 | Stop reason: SDUPTHER

## 2018-03-09 RX ORDER — AMLODIPINE BESYLATE 5 MG/1
5 TABLET ORAL DAILY
Qty: 90 TABLET | Refills: 0 | Status: SHIPPED | OUTPATIENT
Start: 2018-03-09 | End: 2018-03-09 | Stop reason: SDUPTHER

## 2018-03-09 RX ORDER — AMLODIPINE BESYLATE 10 MG/1
TABLET ORAL
Qty: 90 TABLET | Refills: 0 | Status: SHIPPED | OUTPATIENT
Start: 2018-03-09 | End: 2018-03-09 | Stop reason: DRUGHIGH

## 2018-03-09 RX ORDER — PRAVASTATIN SODIUM 40 MG
40 TABLET ORAL NIGHTLY
Qty: 90 TABLET | Refills: 0 | Status: SHIPPED | OUTPATIENT
Start: 2018-03-09 | End: 2018-06-12 | Stop reason: SDUPTHER

## 2018-06-12 ENCOUNTER — OFFICE VISIT (OUTPATIENT)
Dept: INTERNAL MEDICINE CLINIC | Age: 83
End: 2018-06-12

## 2018-06-12 VITALS
BODY MASS INDEX: 24.27 KG/M2 | HEIGHT: 66 IN | HEART RATE: 64 BPM | WEIGHT: 151 LBS | TEMPERATURE: 98 F | DIASTOLIC BLOOD PRESSURE: 76 MMHG | SYSTOLIC BLOOD PRESSURE: 130 MMHG | OXYGEN SATURATION: 99 %

## 2018-06-12 DIAGNOSIS — R91.1 PULMONARY NODULE: ICD-10-CM

## 2018-06-12 DIAGNOSIS — M54.2 NECK PAIN: ICD-10-CM

## 2018-06-12 DIAGNOSIS — F33.9 RECURRENT MAJOR DEPRESSIVE DISORDER, REMISSION STATUS UNSPECIFIED (HCC): ICD-10-CM

## 2018-06-12 DIAGNOSIS — E78.49 OTHER HYPERLIPIDEMIA: ICD-10-CM

## 2018-06-12 DIAGNOSIS — I10 ESSENTIAL HYPERTENSION: Primary | ICD-10-CM

## 2018-06-12 DIAGNOSIS — E55.9 VITAMIN D DEFICIENCY: ICD-10-CM

## 2018-06-12 DIAGNOSIS — D69.6 THROMBOCYTOPENIA (HCC): ICD-10-CM

## 2018-06-12 DIAGNOSIS — Z23 NEED FOR VACCINATION FOR PNEUMOCOCCUS: ICD-10-CM

## 2018-06-12 DIAGNOSIS — I10 ESSENTIAL HYPERTENSION: ICD-10-CM

## 2018-06-12 LAB
A/G RATIO: 1.9 (ref 1.1–2.2)
ALBUMIN SERPL-MCNC: 4.5 G/DL (ref 3.4–5)
ALP BLD-CCNC: 76 U/L (ref 40–129)
ALT SERPL-CCNC: 13 U/L (ref 10–40)
ANION GAP SERPL CALCULATED.3IONS-SCNC: 13 MMOL/L (ref 3–16)
AST SERPL-CCNC: 19 U/L (ref 15–37)
BASOPHILS ABSOLUTE: 0 K/UL (ref 0–0.2)
BASOPHILS RELATIVE PERCENT: 0.5 %
BILIRUB SERPL-MCNC: 1.1 MG/DL (ref 0–1)
BUN BLDV-MCNC: 9 MG/DL (ref 7–20)
CALCIUM SERPL-MCNC: 9.7 MG/DL (ref 8.3–10.6)
CHLORIDE BLD-SCNC: 103 MMOL/L (ref 99–110)
CHOLESTEROL, TOTAL: 181 MG/DL (ref 0–199)
CO2: 27 MMOL/L (ref 21–32)
CREAT SERPL-MCNC: 0.8 MG/DL (ref 0.6–1.2)
CREATININE URINE: 128 MG/DL (ref 28–259)
EOSINOPHILS ABSOLUTE: 0.1 K/UL (ref 0–0.6)
EOSINOPHILS RELATIVE PERCENT: 1 %
GFR AFRICAN AMERICAN: >60
GFR NON-AFRICAN AMERICAN: >60
GLOBULIN: 2.4 G/DL
GLUCOSE BLD-MCNC: 91 MG/DL (ref 70–99)
HCT VFR BLD CALC: 41.8 % (ref 36–48)
HDLC SERPL-MCNC: 59 MG/DL (ref 40–60)
HEMOGLOBIN: 13.4 G/DL (ref 12–16)
LDL CHOLESTEROL CALCULATED: 109 MG/DL
LYMPHOCYTES ABSOLUTE: 1.5 K/UL (ref 1–5.1)
LYMPHOCYTES RELATIVE PERCENT: 27.5 %
MCH RBC QN AUTO: 26.5 PG (ref 26–34)
MCHC RBC AUTO-ENTMCNC: 32.1 G/DL (ref 31–36)
MCV RBC AUTO: 82.4 FL (ref 80–100)
MICROALBUMIN UR-MCNC: <1.2 MG/DL
MICROALBUMIN/CREAT UR-RTO: NORMAL MG/G (ref 0–30)
MONOCYTES ABSOLUTE: 0.4 K/UL (ref 0–1.3)
MONOCYTES RELATIVE PERCENT: 8.5 %
NEUTROPHILS ABSOLUTE: 3.3 K/UL (ref 1.7–7.7)
NEUTROPHILS RELATIVE PERCENT: 62.5 %
PDW BLD-RTO: 14.5 % (ref 12.4–15.4)
PLATELET # BLD: 107 K/UL (ref 135–450)
PLATELET SLIDE REVIEW: ABNORMAL
PMV BLD AUTO: 11.3 FL (ref 5–10.5)
POTASSIUM SERPL-SCNC: 4.2 MMOL/L (ref 3.5–5.1)
RBC # BLD: 5.07 M/UL (ref 4–5.2)
SODIUM BLD-SCNC: 143 MMOL/L (ref 136–145)
TOTAL PROTEIN: 6.9 G/DL (ref 6.4–8.2)
TRIGL SERPL-MCNC: 66 MG/DL (ref 0–150)
VITAMIN D 25-HYDROXY: 32.7 NG/ML
VLDLC SERPL CALC-MCNC: 13 MG/DL
WBC # BLD: 5.3 K/UL (ref 4–11)

## 2018-06-12 PROCEDURE — 1090F PRES/ABSN URINE INCON ASSESS: CPT | Performed by: INTERNAL MEDICINE

## 2018-06-12 PROCEDURE — G8427 DOCREV CUR MEDS BY ELIG CLIN: HCPCS | Performed by: INTERNAL MEDICINE

## 2018-06-12 PROCEDURE — G0009 ADMIN PNEUMOCOCCAL VACCINE: HCPCS | Performed by: INTERNAL MEDICINE

## 2018-06-12 PROCEDURE — 1123F ACP DISCUSS/DSCN MKR DOCD: CPT | Performed by: INTERNAL MEDICINE

## 2018-06-12 PROCEDURE — 99214 OFFICE O/P EST MOD 30 MIN: CPT | Performed by: INTERNAL MEDICINE

## 2018-06-12 PROCEDURE — 4040F PNEUMOC VAC/ADMIN/RCVD: CPT | Performed by: INTERNAL MEDICINE

## 2018-06-12 PROCEDURE — 90670 PCV13 VACCINE IM: CPT | Performed by: INTERNAL MEDICINE

## 2018-06-12 PROCEDURE — G8400 PT W/DXA NO RESULTS DOC: HCPCS | Performed by: INTERNAL MEDICINE

## 2018-06-12 PROCEDURE — G8420 CALC BMI NORM PARAMETERS: HCPCS | Performed by: INTERNAL MEDICINE

## 2018-06-12 PROCEDURE — 1036F TOBACCO NON-USER: CPT | Performed by: INTERNAL MEDICINE

## 2018-06-12 RX ORDER — VALSARTAN 160 MG/1
160 TABLET ORAL DAILY
Qty: 90 TABLET | Refills: 0 | Status: SHIPPED | OUTPATIENT
Start: 2018-06-12 | End: 2018-08-14 | Stop reason: RX

## 2018-06-12 RX ORDER — PRAVASTATIN SODIUM 40 MG
40 TABLET ORAL NIGHTLY
Qty: 90 TABLET | Refills: 0 | Status: SHIPPED | OUTPATIENT
Start: 2018-06-12 | End: 2018-09-13 | Stop reason: SDUPTHER

## 2018-06-12 RX ORDER — AMLODIPINE BESYLATE 5 MG/1
5 TABLET ORAL DAILY
Qty: 90 TABLET | Refills: 0 | Status: SHIPPED | OUTPATIENT
Start: 2018-06-12 | End: 2018-09-13 | Stop reason: SDUPTHER

## 2018-06-12 RX ORDER — MULTIVIT-MIN/IRON/FOLIC ACID/K 18-600-40
1 CAPSULE ORAL DAILY
Qty: 90 CAPSULE | Refills: 0 | Status: SHIPPED | OUTPATIENT
Start: 2018-06-12 | End: 2018-09-13 | Stop reason: SDUPTHER

## 2018-06-12 ASSESSMENT — PATIENT HEALTH QUESTIONNAIRE - PHQ9
SUM OF ALL RESPONSES TO PHQ QUESTIONS 1-9: 0
SUM OF ALL RESPONSES TO PHQ9 QUESTIONS 1 & 2: 0
1. LITTLE INTEREST OR PLEASURE IN DOING THINGS: 0
2. FEELING DOWN, DEPRESSED OR HOPELESS: 0

## 2018-06-19 ENCOUNTER — HOSPITAL ENCOUNTER (OUTPATIENT)
Dept: CT IMAGING | Age: 83
Discharge: HOME | End: 2018-06-19
Attending: INTERNAL MEDICINE | Admitting: INTERNAL MEDICINE

## 2018-06-19 DIAGNOSIS — R91.1 SOLITARY PULMONARY NODULE: ICD-10-CM

## 2018-06-19 DIAGNOSIS — R91.1 PULMONARY NODULE: ICD-10-CM

## 2018-08-07 DIAGNOSIS — J30.89 OTHER ALLERGIC RHINITIS: ICD-10-CM

## 2018-08-07 RX ORDER — FLUTICASONE PROPIONATE 50 MCG
SPRAY, SUSPENSION (ML) NASAL
Qty: 1 BOTTLE | Refills: 1 | Status: SHIPPED | OUTPATIENT
Start: 2018-08-07 | End: 2018-11-05 | Stop reason: SDUPTHER

## 2018-08-13 ENCOUNTER — TELEPHONE (OUTPATIENT)
Dept: INTERNAL MEDICINE CLINIC | Age: 83
End: 2018-08-13

## 2018-08-13 NOTE — TELEPHONE ENCOUNTER
Patient says that pharmacy called about medication valsartan needing to be changed patient is all out of meds patient would like a call today to know what med is being changed to please advise.

## 2018-08-14 RX ORDER — IRBESARTAN 150 MG/1
150 TABLET ORAL DAILY
Qty: 30 TABLET | Refills: 3 | Status: SHIPPED | OUTPATIENT
Start: 2018-08-14 | End: 2018-10-04 | Stop reason: SDUPTHER

## 2018-09-08 ENCOUNTER — APPOINTMENT (OUTPATIENT)
Dept: GENERAL RADIOLOGY | Age: 83
End: 2018-09-08
Payer: MEDICARE

## 2018-09-08 ENCOUNTER — HOSPITAL ENCOUNTER (EMERGENCY)
Age: 83
Discharge: HOME | End: 2018-09-08
Attending: EMERGENCY MEDICINE
Payer: MEDICARE

## 2018-09-08 VITALS
DIASTOLIC BLOOD PRESSURE: 95 MMHG | SYSTOLIC BLOOD PRESSURE: 176 MMHG | RESPIRATION RATE: 18 BRPM | HEIGHT: 66 IN | HEART RATE: 68 BPM | TEMPERATURE: 97.6 F | WEIGHT: 148 LBS | BODY MASS INDEX: 23.78 KG/M2 | OXYGEN SATURATION: 100 %

## 2018-09-08 DIAGNOSIS — R53.82 CHRONIC FATIGUE: ICD-10-CM

## 2018-09-08 DIAGNOSIS — Z77.22 SECOND HAND SMOKE EXPOSURE: Primary | ICD-10-CM

## 2018-09-08 LAB
ANION GAP SERPL CALCULATED.3IONS-SCNC: 10 MMOL/L (ref 3–16)
BACTERIA: ABNORMAL /HPF
BASOPHILS ABSOLUTE: 0 K/UL (ref 0–0.2)
BASOPHILS RELATIVE PERCENT: 0.4 %
BILIRUBIN URINE: NEGATIVE
BLOOD, URINE: NEGATIVE
BUN BLDV-MCNC: 9 MG/DL (ref 7–20)
CALCIUM SERPL-MCNC: 9.2 MG/DL (ref 8.3–10.6)
CHLORIDE BLD-SCNC: 107 MMOL/L (ref 99–110)
CLARITY: CLEAR
CO2: 27 MMOL/L (ref 21–32)
COLOR: YELLOW
CREAT SERPL-MCNC: 0.8 MG/DL (ref 0.6–1.2)
EOSINOPHILS ABSOLUTE: 0 K/UL (ref 0–0.6)
EOSINOPHILS RELATIVE PERCENT: 0.4 %
EPITHELIAL CELLS, UA: ABNORMAL /HPF
GFR AFRICAN AMERICAN: >60
GFR NON-AFRICAN AMERICAN: >60
GLUCOSE BLD-MCNC: 102 MG/DL (ref 70–99)
GLUCOSE URINE: NEGATIVE MG/DL
HCT VFR BLD CALC: 39.5 % (ref 36–48)
HEMOGLOBIN: 12.6 G/DL (ref 12–16)
KETONES, URINE: NEGATIVE MG/DL
LEUKOCYTE ESTERASE, URINE: ABNORMAL
LYMPHOCYTES ABSOLUTE: 0.7 K/UL (ref 1–5.1)
LYMPHOCYTES RELATIVE PERCENT: 15.5 %
MCH RBC QN AUTO: 26.4 PG (ref 26–34)
MCHC RBC AUTO-ENTMCNC: 31.8 G/DL (ref 31–36)
MCV RBC AUTO: 83 FL (ref 80–100)
MICROSCOPIC EXAMINATION: YES
MONOCYTES ABSOLUTE: 0.4 K/UL (ref 0–1.3)
MONOCYTES RELATIVE PERCENT: 8.8 %
NEUTROPHILS ABSOLUTE: 3.6 K/UL (ref 1.7–7.7)
NEUTROPHILS RELATIVE PERCENT: 74.9 %
NITRITE, URINE: NEGATIVE
PDW BLD-RTO: 14.7 % (ref 12.4–15.4)
PH UA: 6.5
PLATELET # BLD: 112 K/UL (ref 135–450)
PMV BLD AUTO: 10.5 FL (ref 5–10.5)
POTASSIUM REFLEX MAGNESIUM: 4 MMOL/L (ref 3.5–5.1)
PROTEIN UA: NEGATIVE MG/DL
RBC # BLD: 4.76 M/UL (ref 4–5.2)
RBC UA: ABNORMAL /HPF (ref 0–2)
SODIUM BLD-SCNC: 144 MMOL/L (ref 136–145)
SPECIFIC GRAVITY UA: 1.01
TROPONIN: <0.01 NG/ML
URINE TYPE: ABNORMAL
UROBILINOGEN, URINE: 0.2 E.U./DL
WBC # BLD: 4.7 K/UL (ref 4–11)
WBC UA: ABNORMAL /HPF (ref 0–5)

## 2018-09-08 PROCEDURE — 93005 ELECTROCARDIOGRAM TRACING: CPT | Performed by: PHYSICIAN ASSISTANT

## 2018-09-08 PROCEDURE — 80048 BASIC METABOLIC PNL TOTAL CA: CPT

## 2018-09-08 PROCEDURE — 99285 EMERGENCY DEPT VISIT HI MDM: CPT

## 2018-09-08 PROCEDURE — 71045 X-RAY EXAM CHEST 1 VIEW: CPT

## 2018-09-08 PROCEDURE — 84484 ASSAY OF TROPONIN QUANT: CPT

## 2018-09-08 PROCEDURE — 81001 URINALYSIS AUTO W/SCOPE: CPT

## 2018-09-08 PROCEDURE — 85025 COMPLETE CBC W/AUTO DIFF WBC: CPT

## 2018-09-08 ASSESSMENT — ENCOUNTER SYMPTOMS
ABDOMINAL PAIN: 0
COUGH: 0
SORE THROAT: 0
SHORTNESS OF BREATH: 0
WHEEZING: 0
CHEST TIGHTNESS: 1
VOMITING: 0
NAUSEA: 0

## 2018-09-08 NOTE — ED PROVIDER NOTES
Troponin   Result Value Ref Range    Troponin <0.01 <0.01 ng/mL   Urinalysis,reflex to microscopic (Lab)(UA)   Result Value Ref Range    Color, UA Yellow Straw/Yellow    Clarity, UA Clear Clear    Glucose, Ur Negative Negative mg/dL    Bilirubin Urine Negative Negative    Ketones, Urine Negative Negative mg/dL    Specific Gravity, UA 1.015 1.005 - 1.030    Blood, Urine Negative Negative    pH, UA 6.5 5.0 - 8.0    Protein, UA Negative Negative mg/dL    Urobilinogen, Urine 0.2 <2.0 E.U./dL    Nitrite, Urine Negative Negative    Leukocyte Esterase, Urine SMALL (A) Negative    Microscopic Examination YES     Urine Type Not Specified    Microscopic Urinalysis   Result Value Ref Range    WBC, UA 3-5 0 - 5 /HPF    RBC, UA None seen 0 - 2 /HPF    Epi Cells 5-10 /HPF    Bacteria, UA 1+ (A) /HPF       RECENT VITALS:  BP: (!) 176/95, Temp: 97.6 °F (36.4 °C), Pulse: 68, Resp: 18, SpO2: 100 %       ED Course     Nursing Notes, Past Medical Hx, Past Surgical Hx, Social Hx, Allergies, and Family Hx were reviewed. The patient was given the following medications:  No orders of the defined types were placed in this encounter. CONSULTS:  None    MEDICAL DECISION MAKING / ASSESSMENT / Nithintrish Ashton is a 81 y.o. female with a history of hypertension, hyperlipidemia, ITP presents today with 3 months fatigue gradually worsened past 24 hours with new accompanying chest pressure. Endorses unfortunate social situation where her neighbors have consistently been smoking marijuana causing second hand smoke exposure. On presentation she is mildly hypertensive but otherwise hemodynamically stable. HTN more likely due to patient not taking BP meds this AM rather than acute cardiac or vascular process. Clear lung sounds bilaterally, afebrile, and CXR revealed No air space disease or pulmonary infiltrates making acute HF or pneumonia less likely. Urinalysis unremarkable.   Known thrombocytopenia near her baseline with no

## 2018-09-08 NOTE — ED TRIAGE NOTES
Pt presents to ED with generalized weakness and dizziness. Pt states on set 2 weeks ago. Pt states she gets nausea with dizziness, denies chest pain and denies SOB.

## 2018-09-13 ENCOUNTER — OFFICE VISIT (OUTPATIENT)
Dept: INTERNAL MEDICINE CLINIC | Age: 83
End: 2018-09-13

## 2018-09-13 VITALS
WEIGHT: 138.8 LBS | OXYGEN SATURATION: 98 % | SYSTOLIC BLOOD PRESSURE: 130 MMHG | BODY MASS INDEX: 22.31 KG/M2 | HEIGHT: 66 IN | HEART RATE: 67 BPM | TEMPERATURE: 97.7 F | DIASTOLIC BLOOD PRESSURE: 80 MMHG

## 2018-09-13 DIAGNOSIS — I10 ESSENTIAL HYPERTENSION: ICD-10-CM

## 2018-09-13 DIAGNOSIS — F32.89 OTHER DEPRESSION: ICD-10-CM

## 2018-09-13 DIAGNOSIS — R53.83 OTHER FATIGUE: ICD-10-CM

## 2018-09-13 DIAGNOSIS — R63.4 WEIGHT LOSS: ICD-10-CM

## 2018-09-13 DIAGNOSIS — E78.49 OTHER HYPERLIPIDEMIA: ICD-10-CM

## 2018-09-13 DIAGNOSIS — Z77.22 SECOND HAND SMOKE EXPOSURE: ICD-10-CM

## 2018-09-13 DIAGNOSIS — I10 ESSENTIAL HYPERTENSION: Primary | ICD-10-CM

## 2018-09-13 DIAGNOSIS — K22.89 ESOPHAGEAL THICKENING: ICD-10-CM

## 2018-09-13 DIAGNOSIS — D69.6 THROMBOCYTOPENIA (HCC): ICD-10-CM

## 2018-09-13 DIAGNOSIS — E55.9 VITAMIN D DEFICIENCY: ICD-10-CM

## 2018-09-13 DIAGNOSIS — R91.1 PULMONARY NODULE: ICD-10-CM

## 2018-09-13 LAB
A/G RATIO: 2 (ref 1.1–2.2)
ALBUMIN SERPL-MCNC: 4.4 G/DL (ref 3.4–5)
ALP BLD-CCNC: 68 U/L (ref 40–129)
ALT SERPL-CCNC: 12 U/L (ref 10–40)
AMPHETAMINE SCREEN, URINE: NORMAL
ANION GAP SERPL CALCULATED.3IONS-SCNC: 17 MMOL/L (ref 3–16)
AST SERPL-CCNC: 21 U/L (ref 15–37)
BARBITURATE SCREEN URINE: NORMAL
BENZODIAZEPINE SCREEN, URINE: NORMAL
BILIRUB SERPL-MCNC: 1.5 MG/DL (ref 0–1)
BUN BLDV-MCNC: 9 MG/DL (ref 7–20)
CALCIUM SERPL-MCNC: 9.5 MG/DL (ref 8.3–10.6)
CANNABINOID SCREEN URINE: NORMAL
CHLORIDE BLD-SCNC: 104 MMOL/L (ref 99–110)
CO2: 25 MMOL/L (ref 21–32)
COCAINE METABOLITE SCREEN URINE: NORMAL
CREAT SERPL-MCNC: 0.8 MG/DL (ref 0.6–1.2)
FOLATE: 12.54 NG/ML (ref 4.78–24.2)
GFR AFRICAN AMERICAN: >60
GFR NON-AFRICAN AMERICAN: >60
GLOBULIN: 2.2 G/DL
GLUCOSE BLD-MCNC: 79 MG/DL (ref 70–99)
LIPASE: 22 U/L (ref 13–60)
Lab: NORMAL
METHADONE SCREEN, URINE: NORMAL
OPIATE SCREEN URINE: NORMAL
OXYCODONE URINE: NORMAL
PH UA: 5
PHENCYCLIDINE SCREEN URINE: NORMAL
POTASSIUM SERPL-SCNC: 4.1 MMOL/L (ref 3.5–5.1)
PROPOXYPHENE SCREEN: NORMAL
SODIUM BLD-SCNC: 146 MMOL/L (ref 136–145)
TOTAL PROTEIN: 6.6 G/DL (ref 6.4–8.2)
TSH REFLEX: 0.86 UIU/ML (ref 0.27–4.2)
VITAMIN B-12: 474 PG/ML (ref 211–911)

## 2018-09-13 PROCEDURE — 1101F PT FALLS ASSESS-DOCD LE1/YR: CPT | Performed by: INTERNAL MEDICINE

## 2018-09-13 PROCEDURE — 4040F PNEUMOC VAC/ADMIN/RCVD: CPT | Performed by: INTERNAL MEDICINE

## 2018-09-13 PROCEDURE — 1036F TOBACCO NON-USER: CPT | Performed by: INTERNAL MEDICINE

## 2018-09-13 PROCEDURE — G8427 DOCREV CUR MEDS BY ELIG CLIN: HCPCS | Performed by: INTERNAL MEDICINE

## 2018-09-13 PROCEDURE — 1123F ACP DISCUSS/DSCN MKR DOCD: CPT | Performed by: INTERNAL MEDICINE

## 2018-09-13 PROCEDURE — 1090F PRES/ABSN URINE INCON ASSESS: CPT | Performed by: INTERNAL MEDICINE

## 2018-09-13 PROCEDURE — G8400 PT W/DXA NO RESULTS DOC: HCPCS | Performed by: INTERNAL MEDICINE

## 2018-09-13 PROCEDURE — 99214 OFFICE O/P EST MOD 30 MIN: CPT | Performed by: INTERNAL MEDICINE

## 2018-09-13 PROCEDURE — G8420 CALC BMI NORM PARAMETERS: HCPCS | Performed by: INTERNAL MEDICINE

## 2018-09-13 RX ORDER — MULTIVIT-MIN/IRON/FOLIC ACID/K 18-600-40
1 CAPSULE ORAL DAILY
Qty: 90 CAPSULE | Refills: 0 | Status: SHIPPED | OUTPATIENT
Start: 2018-09-13 | End: 2018-11-30 | Stop reason: SDUPTHER

## 2018-09-13 RX ORDER — AMLODIPINE BESYLATE 5 MG/1
5 TABLET ORAL DAILY
Qty: 90 TABLET | Refills: 0 | Status: SHIPPED | OUTPATIENT
Start: 2018-09-13 | End: 2018-11-30 | Stop reason: SDUPTHER

## 2018-09-13 RX ORDER — PRAVASTATIN SODIUM 40 MG
40 TABLET ORAL NIGHTLY
Qty: 90 TABLET | Refills: 0 | Status: SHIPPED | OUTPATIENT
Start: 2018-09-13 | End: 2018-11-30 | Stop reason: SDUPTHER

## 2018-09-13 NOTE — PATIENT INSTRUCTIONS
TAKE MED AS ADVISED    DIET/ EXERCISE.     FOLLOW UP WITHIN 2 MONTHS / AS NEEDED    FOLLOW UP FOR LABS

## 2018-09-13 NOTE — PROGRESS NOTES
SUBJECTIVE:  Vickey Aquino is a 81 y.o. female [address] [place]   Chief Complaint   Patient presents with    Follow-up    Hypertension        PT HERE FOR EVAL    HTN - TAKING MEDS. Richardson Kilgore   HLP - MED Yes / DIET Yes COMPLIANCE . OCC MUSCLE CRAMPS / NO MUSCLE ACHES  VIT D DEF - TAKING MED. LABS D/W PT  DEPRESSION- TOLERATING MED. ? INSOMNIA. DENIES SUICIDAL / NO HOMICIDAL THOUGHTS / IDEATION . PT FOLLOWING ALSO WITH PSYCH  PULM. NODULE - STABLE. FOLLOW UP CT D/W PT  HIATAL / HERNIA AND DISTAL ESOPHAGEAL WALL THICKENING - NOTED ON CT D/W PT. ? DYSPHAGIA - UNSURE. S/P PRIOR EGD / C SCOPE  WT LOSS - WT NOTED. DIET REVIEWED  THROMBOCYTOPENIA - CHRONIC.   NO BRUISING  C/O FATIGUE - ? DURATION. ? NO COLD / HEAT INTOLERANCE  PT STATES PROBLEMS WITH SECOND HAND INHALATION AT HER APPT. STATES UNSURE OF WHAT THE EXACT AGENT. PT SEEN RECENTLY IN ER      DENIES CP, No SOB, No PALPITATIONS, OCC COUGH - OCC PRODUCTIVE, ? COLOR OF PHLEGM, NO HEMOPTYSIS  No ABD PAIN, OCC NAUSEA, NO VOMTING, No DIARRHEA, No CONSTIPATION, No MELENA, No HEMATOCHEZIA. No DYSURIA, No FREQ, No URGENCY, No HEMATURIA    PMH: REVIEWED AND UPDATED TODAY    PSH: REVIEWED AND UPDATED TODAY    SOCIAL HX: REVIEWED AND UPDATED TODAY    ALLERGY:  Nabumetone    MEDS: REVIEWED  Prior to Visit Medications    Medication Sig Taking?  Authorizing Provider   irbesartan (AVAPRO) 150 MG tablet Take 1 tablet by mouth daily Yes Karina Christiansen MD   fluticasone (FLONASE) 50 MCG/ACT nasal spray SPRAY TWO SPRAYS IN EACH NOSTRIL ONCE DAILY FOR RHINITIS Yes Karina Christiansen MD   pravastatin (PRAVACHOL) 40 MG tablet Take 1 tablet by mouth nightly Yes Karina Christiansen MD   Cholecalciferol (VITAMIN D) 2000 units CAPS capsule Take 1 capsule by mouth daily Yes Karina Christiansen MD   amLODIPine (NORVASC) 5 MG tablet Take 1 tablet by mouth daily Yes Karina Christiansen MD   acetaminophen (TYLENOL) 325 MG tablet Take 2 tablets by mouth every 4 hours as needed for Pain Yes Velma Lyle MD   naproxen (NAPROSYN) 500 MG tablet Take 1 tablet by mouth 2 times daily as needed for Pain Yes Dago Ferreira MD   fexofenadine (ALLEGRA) 180 MG tablet Take 1 tablet by mouth daily as needed (AS NEEDED) Yes Dago Ferreira MD   aspirin 81 MG tablet Take 81 mg by mouth daily Yes Historical Provider, MD   Multiple Vitamins-Minerals (THERAPEUTIC MULTIVITAMIN-MINERALS) tablet Take 1 tablet by mouth daily Yes Dago Ferreira MD   buPROPion [facility] SR) 100 MG SR tablet Take 100 mg by mouth daily. Yes Historical Provider, MD   clorazepate (TRANXENE) 7.5 MG tablet Take 7.5 mg by mouth 2 times daily. One in am and 2 in pm  Yes Historical Provider, MD         ROS: COMPREHENSIVE ROS AS IN HX, REST -VE  History obtained from chart review and the patient    OBJECTIVE:   NURSING NOTE AND VITALS REVIEWED  /82 (Site: Left Upper Arm, Position: Sitting, Cuff Size: Medium Adult)   Pulse 67   Temp 97.7 °F (36.5 °C)   Ht 5' 6\" (1.676 m)   Wt 138 lb 12.8 oz (63 kg)   SpO2 98%   Breastfeeding? No   BMI 22.40 kg/m²     NO ACUTE DISTRESS    REPEAT BP:  130/80 (LT), NO ORTHOSTASIS     Body mass index is 22.4 kg/m². HEENT: NO PALLOR, ANICTERIC, PERRLA, EOMI, NO CONJUNCTIVAL ERYTHEMA,                 NO SINUS TENDERNESS  NECK:  SUPPLE, TRACHEA MIDLINE, NT, NO JVD, NO CB, NO LA, NO TM, NO STIFFNESS  CHEST: RESPY EFFORT NL, GOOD AE, NO W/R/C  HEART: S1S2+ REG, NO M/G/R  ABD: SOFT, NT, NO HSM, BS+  EXT: NO EDEMA, NT, PULSES +. TWAN'S -VE  NEURO: ALERT AND ORIENTED X 3, NO MENINGEAL SIGNS, NO TREMORS, NL GAIT, NO FOCAL DEFICITS  PSYCH: OCC DEPRESSED AFFECT  BACK: NT, NO ROM, NO CVA TENDERNESS  SKIN: NO BRUISING. PREVIOUS LABS / EGD / C SCOPE / CXR / CT REVIEWED AND D/W PT     IMPRESSION:  1.  Schatzki ring. 2.  Diverticulosis of the colon. ASSESSMENT / PLAN:     Diagnosis Orders   1. Essential hypertension  COUNSELLED. CONTINUE MEDS. ADVISED LOW NA+ / DASH DIET/ EXERCISE. MONITOR.  GOAL </=

## 2018-09-20 LAB
EKG ATRIAL RATE: 62 BPM
EKG DIAGNOSIS: NORMAL
EKG P AXIS: 62 DEGREES
EKG P-R INTERVAL: 194 MS
EKG Q-T INTERVAL: 410 MS
EKG QRS DURATION: 94 MS
EKG QTC CALCULATION (BAZETT): 416 MS
EKG R AXIS: -49 DEGREES
EKG T AXIS: 28 DEGREES
EKG VENTRICULAR RATE: 62 BPM

## 2018-09-29 ENCOUNTER — APPOINTMENT (OUTPATIENT)
Dept: CT IMAGING | Age: 83
End: 2018-09-29
Payer: MEDICARE

## 2018-09-29 ENCOUNTER — HOSPITAL ENCOUNTER (OUTPATIENT)
Age: 83
Setting detail: OBSERVATION
Discharge: HOME | End: 2018-10-01
Attending: EMERGENCY MEDICINE | Admitting: INTERNAL MEDICINE
Payer: MEDICARE

## 2018-09-29 ENCOUNTER — APPOINTMENT (OUTPATIENT)
Dept: GENERAL RADIOLOGY | Age: 83
End: 2018-09-29
Payer: MEDICARE

## 2018-09-29 DIAGNOSIS — R55 NEAR SYNCOPE: ICD-10-CM

## 2018-09-29 DIAGNOSIS — R55 PRE-SYNCOPE: Primary | ICD-10-CM

## 2018-09-29 DIAGNOSIS — R11.0 NAUSEA: ICD-10-CM

## 2018-09-29 PROBLEM — R42 LIGHTHEADED: Status: ACTIVE | Noted: 2018-09-29

## 2018-09-29 LAB
BASE EXCESS VENOUS: 6 (ref -3–3)
BASOPHILS ABSOLUTE: 0 K/UL (ref 0–0.2)
BASOPHILS RELATIVE PERCENT: 0.7 %
BILIRUBIN URINE: NEGATIVE
BLOOD, URINE: NEGATIVE
CALCIUM IONIZED: 1.16 MMOL/L (ref 1.12–1.32)
CLARITY: ABNORMAL
CO2: 29 MMOL/L (ref 21–32)
COLOR: YELLOW
EOSINOPHILS ABSOLUTE: 0 K/UL (ref 0–0.6)
EOSINOPHILS RELATIVE PERCENT: 0.6 %
GFR AFRICAN AMERICAN: 52
GFR NON-AFRICAN AMERICAN: 43
GLUCOSE BLD-MCNC: 101 MG/DL (ref 70–99)
GLUCOSE URINE: NEGATIVE MG/DL
HCO3 VENOUS: 30.2 MMOL/L (ref 23–29)
HCT VFR BLD CALC: 38 % (ref 36–48)
HEMOGLOBIN: 12.2 G/DL (ref 12–16)
KETONES, URINE: NEGATIVE MG/DL
LACTATE: 0.63 MMOL/L (ref 0.4–2)
LEUKOCYTE ESTERASE, URINE: NEGATIVE
LYMPHOCYTES ABSOLUTE: 1.1 K/UL (ref 1–5.1)
LYMPHOCYTES RELATIVE PERCENT: 19 %
MCH RBC QN AUTO: 26.7 PG (ref 26–34)
MCHC RBC AUTO-ENTMCNC: 32.1 G/DL (ref 31–36)
MCV RBC AUTO: 83.3 FL (ref 80–100)
MICROSCOPIC EXAMINATION: ABNORMAL
MONOCYTES ABSOLUTE: 0.5 K/UL (ref 0–1.3)
MONOCYTES RELATIVE PERCENT: 9.3 %
NEUTROPHILS ABSOLUTE: 4.1 K/UL (ref 1.7–7.7)
NEUTROPHILS RELATIVE PERCENT: 70.4 %
NITRITE, URINE: NEGATIVE
O2 SAT, VEN: 88 %
PCO2, VEN: 46.1 MM HG (ref 40–50)
PDW BLD-RTO: 14.6 % (ref 12.4–15.4)
PERFORMED ON: ABNORMAL
PERFORMED ON: ABNORMAL
PERFORMED ON: NORMAL
PH UA: 6.5
PH VENOUS: 7.42 (ref 7.35–7.45)
PLATELET # BLD: 129 K/UL (ref 135–450)
PMV BLD AUTO: 10.3 FL (ref 5–10.5)
PO2, VEN: 54 MM HG
POC ANION GAP: 10 (ref 10–20)
POC BUN: 14 MG/DL (ref 7–18)
POC CHLORIDE: 104 MMOL/L (ref 99–110)
POC CREATININE: 1.2 MG/DL (ref 0.6–1.2)
POC POTASSIUM: 3.7 MMOL/L (ref 3.5–5.1)
POC SAMPLE TYPE: ABNORMAL
POC SAMPLE TYPE: ABNORMAL
POC SAMPLE TYPE: NORMAL
POC SODIUM: 143 MMOL/L (ref 136–145)
POC TROPONIN I: 0 NG/ML (ref 0–0.1)
PROTEIN UA: NEGATIVE MG/DL
RBC # BLD: 4.57 M/UL (ref 4–5.2)
SPECIFIC GRAVITY UA: 1.02
TCO2 CALC VENOUS: 32 MMOL/L
TROPONIN: <0.01 NG/ML
URINE REFLEX TO CULTURE: ABNORMAL
URINE TYPE: ABNORMAL
UROBILINOGEN, URINE: 1 E.U./DL
WBC # BLD: 5.8 K/UL (ref 4–11)

## 2018-09-29 PROCEDURE — 2580000003 HC RX 258: Performed by: FAMILY MEDICINE

## 2018-09-29 PROCEDURE — 96361 HYDRATE IV INFUSION ADD-ON: CPT

## 2018-09-29 PROCEDURE — 71046 X-RAY EXAM CHEST 2 VIEWS: CPT

## 2018-09-29 PROCEDURE — 81003 URINALYSIS AUTO W/O SCOPE: CPT

## 2018-09-29 PROCEDURE — G0378 HOSPITAL OBSERVATION PER HR: HCPCS

## 2018-09-29 PROCEDURE — 85025 COMPLETE CBC W/AUTO DIFF WBC: CPT

## 2018-09-29 PROCEDURE — 84484 ASSAY OF TROPONIN QUANT: CPT

## 2018-09-29 PROCEDURE — 6370000000 HC RX 637 (ALT 250 FOR IP): Performed by: FAMILY MEDICINE

## 2018-09-29 PROCEDURE — 93005 ELECTROCARDIOGRAM TRACING: CPT | Performed by: INTERNAL MEDICINE

## 2018-09-29 PROCEDURE — 2580000003 HC RX 258: Performed by: INTERNAL MEDICINE

## 2018-09-29 PROCEDURE — 36415 COLL VENOUS BLD VENIPUNCTURE: CPT

## 2018-09-29 PROCEDURE — 99285 EMERGENCY DEPT VISIT HI MDM: CPT

## 2018-09-29 PROCEDURE — 82803 BLOOD GASES ANY COMBINATION: CPT

## 2018-09-29 PROCEDURE — 70450 CT HEAD/BRAIN W/O DYE: CPT

## 2018-09-29 PROCEDURE — 96360 HYDRATION IV INFUSION INIT: CPT

## 2018-09-29 PROCEDURE — 83605 ASSAY OF LACTIC ACID: CPT

## 2018-09-29 PROCEDURE — 80047 BASIC METABLC PNL IONIZED CA: CPT

## 2018-09-29 RX ORDER — ESCITALOPRAM OXALATE 10 MG/1
10 TABLET ORAL DAILY
COMMUNITY
End: 2022-11-03 | Stop reason: SDUPTHER

## 2018-09-29 RX ORDER — M-VIT,TX,IRON,MINS/CALC/FOLIC 27MG-0.4MG
1 TABLET ORAL DAILY
Status: DISCONTINUED | OUTPATIENT
Start: 2018-09-30 | End: 2018-10-01 | Stop reason: HOSPADM

## 2018-09-29 RX ORDER — ONDANSETRON 2 MG/ML
4 INJECTION INTRAMUSCULAR; INTRAVENOUS EVERY 8 HOURS PRN
Status: DISCONTINUED | OUTPATIENT
Start: 2018-09-29 | End: 2018-10-01 | Stop reason: HOSPADM

## 2018-09-29 RX ORDER — ASPIRIN 81 MG/1
81 TABLET, CHEWABLE ORAL DAILY
Status: DISCONTINUED | OUTPATIENT
Start: 2018-09-30 | End: 2018-10-01 | Stop reason: HOSPADM

## 2018-09-29 RX ORDER — SODIUM CHLORIDE 0.9 % (FLUSH) 0.9 %
10 SYRINGE (ML) INJECTION PRN
Status: DISCONTINUED | OUTPATIENT
Start: 2018-09-29 | End: 2018-10-01 | Stop reason: HOSPADM

## 2018-09-29 RX ORDER — LOSARTAN POTASSIUM 25 MG/1
50 TABLET ORAL DAILY
Status: DISCONTINUED | OUTPATIENT
Start: 2018-09-30 | End: 2018-10-01 | Stop reason: HOSPADM

## 2018-09-29 RX ORDER — SODIUM CHLORIDE 9 MG/ML
INJECTION, SOLUTION INTRAVENOUS CONTINUOUS
Status: DISCONTINUED | OUTPATIENT
Start: 2018-09-29 | End: 2018-10-01

## 2018-09-29 RX ORDER — ACETAMINOPHEN 325 MG/1
650 TABLET ORAL EVERY 4 HOURS PRN
Status: DISCONTINUED | OUTPATIENT
Start: 2018-09-29 | End: 2018-09-29

## 2018-09-29 RX ORDER — ACETAMINOPHEN 325 MG/1
650 TABLET ORAL EVERY 6 HOURS PRN
Status: DISCONTINUED | OUTPATIENT
Start: 2018-09-29 | End: 2018-10-01 | Stop reason: HOSPADM

## 2018-09-29 RX ORDER — PRAVASTATIN SODIUM 40 MG
40 TABLET ORAL NIGHTLY
Status: DISCONTINUED | OUTPATIENT
Start: 2018-09-29 | End: 2018-10-01 | Stop reason: HOSPADM

## 2018-09-29 RX ORDER — SODIUM CHLORIDE 0.9 % (FLUSH) 0.9 %
10 SYRINGE (ML) INJECTION EVERY 12 HOURS SCHEDULED
Status: DISCONTINUED | OUTPATIENT
Start: 2018-09-29 | End: 2018-10-01 | Stop reason: HOSPADM

## 2018-09-29 RX ORDER — ESCITALOPRAM OXALATE 10 MG/1
5 TABLET ORAL DAILY
Status: DISCONTINUED | OUTPATIENT
Start: 2018-09-30 | End: 2018-10-01 | Stop reason: HOSPADM

## 2018-09-29 RX ORDER — AMLODIPINE BESYLATE 5 MG/1
5 TABLET ORAL DAILY
Status: DISCONTINUED | OUTPATIENT
Start: 2018-09-30 | End: 2018-10-01 | Stop reason: HOSPADM

## 2018-09-29 RX ORDER — FLUTICASONE PROPIONATE 50 MCG
1 SPRAY, SUSPENSION (ML) NASAL DAILY
Status: DISCONTINUED | OUTPATIENT
Start: 2018-09-30 | End: 2018-10-01 | Stop reason: HOSPADM

## 2018-09-29 RX ORDER — MECLIZINE HYDROCHLORIDE 25 MG/1
25 TABLET ORAL ONCE
Status: DISCONTINUED | OUTPATIENT
Start: 2018-09-29 | End: 2018-09-29

## 2018-09-29 RX ADMIN — PRAVASTATIN SODIUM 40 MG: 40 TABLET ORAL at 22:47

## 2018-09-29 RX ADMIN — Medication 10 ML: at 22:44

## 2018-09-29 RX ADMIN — SODIUM CHLORIDE: 9 INJECTION, SOLUTION INTRAVENOUS at 18:46

## 2018-09-29 ASSESSMENT — ENCOUNTER SYMPTOMS
RESPIRATORY NEGATIVE: 1
GASTROINTESTINAL NEGATIVE: 1
EYES NEGATIVE: 1

## 2018-09-29 NOTE — ED TRIAGE NOTES
PATIENT STATES THAT SHE IS HERE FOR dizziness and nausea for two days. Also c/o pain in her right neck. Patient's son states that he is concerned about a recent (2 WEEKS) [place] STM. States that she repeats conversations, also gave an example of looking for her keys that he had just helped her put in  Her purse  Patient appropriate.  Skin NWD resps unlabored

## 2018-09-29 NOTE — ED PROVIDER NOTES
ED Attending Attestation Note     Date of evaluation: 9/29/2018      Medical Decision Making      This patient was seen by the resident. I have seen and examined the patient, agree with the workup, evaluation, management and diagnosis. The care plan has been discussed. My assessment reveals 81-year-old female presenting with presyncopal episodes for the past 3 weeks with generalized fatigue. Initially patient afebrile hemodynamically stable nontoxic appearing. There is concern for possible arrhythmia versus cardiogenic etiology. We'll obtain labs and imaging. Reassessment 2050  Plan admission for further workup of presyncopal abscess in addition to physical therapy with placement possibly      Diagnostic Results     EKG   Performed 1834 interpreted by ED physician  Normal sinus rhythm rate 60 to anterior T-wave inversion no ST changes  QRS 93     RADIOLOGY:  XR CHEST STANDARD (2 VW)   Final Result      No acute cardiopulmonary abnormality. CT HEAD WO CONTRAST   Final Result      No intracranial acute abnormality or mass effect edema. LABS:   Labs Reviewed   CBC WITH AUTO DIFFERENTIAL - Abnormal; Notable for the following:        Result Value    Platelets 637 (*)     All other components within normal limits    Narrative:     Performed at: [facility]  [address]   Phone [phone]   URINE RT REFLEX TO CULTURE - Abnormal; Notable for the following:     Clarity, UA SL CLOUDY (*)     All other components within normal limits    Narrative:     Performed at: [facility]  [address] [address]   Phone [phone]   POCT VENOUS - Abnormal; Notable for the following:     HCO3, Venous 30.2 (*)     Base Excess, Rolo 6 (*)     All other components within normal limits    Narrative:     Performed at:   The [address] [place] [address] Laboratory  9830

## 2018-09-30 LAB
ALBUMIN SERPL-MCNC: 3.8 G/DL (ref 3.4–5)
ALP BLD-CCNC: 60 U/L (ref 40–129)
ALT SERPL-CCNC: 11 U/L (ref 10–40)
AMMONIA: 35 UMOL/L (ref 11–51)
AST SERPL-CCNC: 17 U/L (ref 15–37)
BILIRUB SERPL-MCNC: 1.2 MG/DL (ref 0–1)
BILIRUBIN DIRECT: <0.2 MG/DL (ref 0–0.3)
BILIRUBIN, INDIRECT: ABNORMAL MG/DL (ref 0–1)
TOTAL PROTEIN: 5.6 G/DL (ref 6.4–8.2)
TSH REFLEX: 1.06 UIU/ML (ref 0.27–4.2)
VITAMIN B-12: 357 PG/ML (ref 211–911)

## 2018-09-30 PROCEDURE — 86593 SYPHILIS TEST NON-TREP QUANT: CPT

## 2018-09-30 PROCEDURE — 84443 ASSAY THYROID STIM HORMONE: CPT

## 2018-09-30 PROCEDURE — 2580000003 HC RX 258: Performed by: INTERNAL MEDICINE

## 2018-09-30 PROCEDURE — 83921 ORGANIC ACID SINGLE QUANT: CPT

## 2018-09-30 PROCEDURE — 82140 ASSAY OF AMMONIA: CPT

## 2018-09-30 PROCEDURE — 6360000002 HC RX W HCPCS: Performed by: FAMILY MEDICINE

## 2018-09-30 PROCEDURE — 80076 HEPATIC FUNCTION PANEL: CPT

## 2018-09-30 PROCEDURE — 6370000000 HC RX 637 (ALT 250 FOR IP): Performed by: FAMILY MEDICINE

## 2018-09-30 PROCEDURE — 36415 COLL VENOUS BLD VENIPUNCTURE: CPT

## 2018-09-30 PROCEDURE — 96372 THER/PROPH/DIAG INJ SC/IM: CPT

## 2018-09-30 PROCEDURE — 82607 VITAMIN B-12: CPT

## 2018-09-30 PROCEDURE — 86780 TREPONEMA PALLIDUM: CPT

## 2018-09-30 PROCEDURE — G0378 HOSPITAL OBSERVATION PER HR: HCPCS

## 2018-09-30 PROCEDURE — 96361 HYDRATE IV INFUSION ADD-ON: CPT

## 2018-09-30 PROCEDURE — 2580000003 HC RX 258: Performed by: FAMILY MEDICINE

## 2018-09-30 RX ADMIN — Medication 10 ML: at 19:53

## 2018-09-30 RX ADMIN — LOSARTAN POTASSIUM 50 MG: 25 TABLET, FILM COATED ORAL at 07:57

## 2018-09-30 RX ADMIN — SODIUM CHLORIDE: 9 INJECTION, SOLUTION INTRAVENOUS at 05:25

## 2018-09-30 RX ADMIN — ENOXAPARIN SODIUM 40 MG: 40 INJECTION SUBCUTANEOUS at 07:56

## 2018-09-30 RX ADMIN — MULTIPLE VITAMINS W/ MINERALS TAB 1 TABLET: TAB at 07:57

## 2018-09-30 RX ADMIN — ACETAMINOPHEN 650 MG: 325 TABLET ORAL at 19:53

## 2018-09-30 RX ADMIN — ACETAMINOPHEN 650 MG: 325 TABLET ORAL at 12:44

## 2018-09-30 RX ADMIN — AMLODIPINE BESYLATE 5 MG: 5 TABLET ORAL at 07:57

## 2018-09-30 RX ADMIN — ASPIRIN 81 MG CHEWABLE TABLET 81 MG: 81 TABLET CHEWABLE at 07:57

## 2018-09-30 RX ADMIN — PRAVASTATIN SODIUM 40 MG: 40 TABLET ORAL at 19:53

## 2018-09-30 RX ADMIN — ESCITALOPRAM OXALATE 5 MG: 10 TABLET ORAL at 07:57

## 2018-09-30 RX ADMIN — CHOLECALCIFEROL TAB 25 MCG (1000 UNIT) 2000 UNITS: 25 TAB at 07:56

## 2018-09-30 ASSESSMENT — PAIN DESCRIPTION - PAIN TYPE: TYPE: ACUTE PAIN

## 2018-09-30 ASSESSMENT — PAIN SCALES - GENERAL
PAINLEVEL_OUTOF10: 3
PAINLEVEL_OUTOF10: 7

## 2018-09-30 ASSESSMENT — PAIN DESCRIPTION - LOCATION: LOCATION: THROAT

## 2018-09-30 ASSESSMENT — PAIN DESCRIPTION - ONSET: ONSET: SUDDEN

## 2018-09-30 ASSESSMENT — PAIN DESCRIPTION - FREQUENCY: FREQUENCY: CONTINUOUS

## 2018-09-30 ASSESSMENT — PAIN DESCRIPTION - DESCRIPTORS: DESCRIPTORS: SORE

## 2018-09-30 NOTE — CONSULTS
no speech/language difficulties, no swallowing difficulties, no visual changes, no diplopia, no hearing loss, no focal weakness, no sensory changes, no nausea or vomiting. She denies any family h/o dementia. Past Medical History:     has a past medical history of Allergic rhinitis; Depression; DJD (degenerative joint disease); Hyperlipidemia; Hypertension; and Thrombocytopenia ([facility] [address] Patient Active Problem List   Diagnosis    HTN (hypertension)    Hyperlipidemia    Allergic rhinitis    Depression    DJD (degenerative joint disease)    Vitamin D deficiency    Low back pain    Muscle cramp    Shoulder pain    Cataract, left    Wrist pain, right / RT THUMB PAIN    Shoulder pain, bilateral    Heart murmur previously undiagnosed    Bilateral shoulder pain    Thrombocytopenia (HCC)    Chronic rhinitis    Bilateral sensorineural hearing loss    Lumbar sprain    Pulmonary nodule/ ABN CT    Vertigo    Fall at home    Loss of balance    Gallstones    Lightheaded       Past Surgical History:  Past Surgical History:   Procedure Laterality Date    BREAST LUMPECTOMY      RT - BENIGN    CATARACT REMOVAL WITH IMPLANT      no implant per pt     HEMORRHOID SURGERY      HYSTERECTOMY         Family History:  family history includes Cancer in her maternal grandmother; Heart Disease in her brother; High Blood Pressure in her brother, mother, sister, and another family member; Stroke in her paternal uncle. Social History:  she reports that she has never smoked. She has never used smokeless tobacco. She reports that she does not drink alcohol or use drugs. Social History     Social History    Marital status: Single     Spouse name: N/A    Number of children: N/A    Years of education: N/A     Occupational History    Not on file.      Social History Main Topics    Smoking status: Never Smoker    Smokeless tobacco: Never Used    Alcohol use No    Drug use: No    Sexual activity: Not Currently     Other Topics Concern    Not on file     Social History Narrative    No narrative on file       Medications: Allergies   Allergen Reactions    Nabumetone Swelling     Ankle       Prescriptions Prior to Admission: pravastatin (PRAVACHOL) 40 MG tablet, Take 1 tablet by mouth nightly  amLODIPine (NORVASC) 5 MG tablet, Take 1 tablet by mouth daily  irbesartan (AVAPRO) 150 MG tablet, Take 1 tablet by mouth daily  fluticasone (FLONASE) 50 MCG/ACT nasal spray, SPRAY TWO SPRAYS IN EACH NOSTRIL ONCE DAILY FOR RHINITIS  acetaminophen (TYLENOL) 325 MG tablet, Take 2 tablets by mouth every 4 hours as needed for Pain  escitalopram (LEXAPRO) 5 MG tablet, Take 5 mg by mouth daily  Cholecalciferol (VITAMIN D) 2000 units CAPS capsule, Take 1 capsule by mouth daily  fexofenadine (ALLEGRA) 180 MG tablet, Take 1 tablet by mouth daily as needed (AS NEEDED)  aspirin 81 MG tablet, Take 81 mg by mouth daily  Multiple Vitamins-Minerals (THERAPEUTIC MULTIVITAMIN-MINERALS) tablet, Take 1 tablet by mouth daily    Review of Systems:  ROS:  Constitutional- No weight loss or fevers  Eyes- No diplopia. No photophobia. Ears/nose/throat- No dysphagia. No Dysarthria  Cardiovascular- No palpitations. No chest pain  Respiratory- No dyspnea. No Cough  Gastrointestinal- No Abdominal pain. No Vomiting. Genitourinary- No incontinence. No urinary retention  Musculoskeletal- No myalgia. No arthralgia  Skin- No rash. No easy bruising. Psychiatric- No depression. No anxiety  Endocrine- No diabetes. No thyroid issues. Hematologic- No bleeding difficulty. No fatigue  Neurologic- as per HPI. OBJECTIVE     Vitals:  Vitals:    09/30/18 1541   BP: (!) 153/83   Pulse:    Resp:    Temp:    SpO2: 95%         Neurological Exam:  Exam:  Blood pressure (!) 153/83, pulse 75, temperature 98 °F (36.7 °C), temperature source Oral, resp.  rate 16, height 5' 9\" (1.753 m), weight 140 lb 12.8 oz (63.9 kg), SpO2 95 %, not currently Daily    fluticasone  1 spray Each Nare Daily    losartan  50 mg Oral Daily    therapeutic multivitamin-minerals  1 tablet Oral Daily    pravastatin  40 mg Oral Nightly    sodium chloride flush  10 mL Intravenous 2 times per day    enoxaparin  40 mg Subcutaneous Daily       Continuous Infusions:    sodium chloride Last Rate: 100 mL/hr at 09/30/18 0525          ASSESSMENT & RECOMMENDATIONS:     83y/F with dizziness, felt to be likely due to dehydration and improving with hydration, seen in consultation for a 2 week history of cognitive decline. There is no evidence of encephalopathy at the time of my exam, and her neurological examination is non-focal.  Workup for reversible causes of dementia with TSH, B12 has been unrevealing. Head imaging has been unrevealing. Attempted to call and speak to family (her son) to get appropriate history about her cognitive changes, and to corroborate history as reported by the patient, but no response. The AM team will try again to get a relevant history. I wonder if whatever is causing the dehydration could also be responsible for her 2 week long decline. If we get the sense that this is really not a recent problem but has been ongoing over months, there would really not be any need for any additional inpatient neurologic intervention, and if the cognitive changes are truly significant would then recommend that she follow up with the memory clinic at [facility]. Will follow    A copy of this note was provided for Dr Bao Whyte MD     Electronically signed by:    Orin Romberg.  Sandip Garcia MD  Consultant Neurologist  [address] [place] [address] [address] Neuroscience  [phone]     9/30/2018,5:03 PM

## 2018-10-01 VITALS
SYSTOLIC BLOOD PRESSURE: 163 MMHG | HEART RATE: 78 BPM | RESPIRATION RATE: 18 BRPM | HEIGHT: 69 IN | OXYGEN SATURATION: 96 % | BODY MASS INDEX: 20.86 KG/M2 | TEMPERATURE: 98.1 F | WEIGHT: 140.8 LBS | DIASTOLIC BLOOD PRESSURE: 69 MMHG

## 2018-10-01 PROBLEM — E86.0 DEHYDRATION: Status: ACTIVE | Noted: 2018-10-01

## 2018-10-01 PROBLEM — R41.89 COGNITIVE DECLINE: Status: ACTIVE | Noted: 2018-10-01

## 2018-10-01 LAB
ANION GAP SERPL CALCULATED.3IONS-SCNC: 8 MMOL/L (ref 3–16)
BUN BLDV-MCNC: 11 MG/DL (ref 7–20)
CALCIUM SERPL-MCNC: 9 MG/DL (ref 8.3–10.6)
CHLORIDE BLD-SCNC: 108 MMOL/L (ref 99–110)
CO2: 27 MMOL/L (ref 21–32)
CREAT SERPL-MCNC: 0.8 MG/DL (ref 0.6–1.2)
GFR AFRICAN AMERICAN: >60
GFR NON-AFRICAN AMERICAN: >60
GLUCOSE BLD-MCNC: 104 MG/DL (ref 70–99)
POTASSIUM REFLEX MAGNESIUM: 4.1 MMOL/L (ref 3.5–5.1)
SODIUM BLD-SCNC: 143 MMOL/L (ref 136–145)
TOTAL SYPHILLIS IGG/IGM: NORMAL

## 2018-10-01 PROCEDURE — 80048 BASIC METABOLIC PNL TOTAL CA: CPT

## 2018-10-01 PROCEDURE — G8989 SELF CARE D/C STATUS: HCPCS

## 2018-10-01 PROCEDURE — 2580000003 HC RX 258: Performed by: INTERNAL MEDICINE

## 2018-10-01 PROCEDURE — G8979 MOBILITY GOAL STATUS: HCPCS

## 2018-10-01 PROCEDURE — G8987 SELF CARE CURRENT STATUS: HCPCS

## 2018-10-01 PROCEDURE — 6360000002 HC RX W HCPCS: Performed by: FAMILY MEDICINE

## 2018-10-01 PROCEDURE — G8980 MOBILITY D/C STATUS: HCPCS

## 2018-10-01 PROCEDURE — G0378 HOSPITAL OBSERVATION PER HR: HCPCS

## 2018-10-01 PROCEDURE — 96372 THER/PROPH/DIAG INJ SC/IM: CPT

## 2018-10-01 PROCEDURE — 2580000003 HC RX 258: Performed by: FAMILY MEDICINE

## 2018-10-01 PROCEDURE — 97535 SELF CARE MNGMENT TRAINING: CPT

## 2018-10-01 PROCEDURE — G8978 MOBILITY CURRENT STATUS: HCPCS

## 2018-10-01 PROCEDURE — 96361 HYDRATE IV INFUSION ADD-ON: CPT

## 2018-10-01 PROCEDURE — 36415 COLL VENOUS BLD VENIPUNCTURE: CPT

## 2018-10-01 PROCEDURE — 97165 OT EVAL LOW COMPLEX 30 MIN: CPT

## 2018-10-01 PROCEDURE — 97530 THERAPEUTIC ACTIVITIES: CPT

## 2018-10-01 PROCEDURE — G8988 SELF CARE GOAL STATUS: HCPCS

## 2018-10-01 PROCEDURE — 6370000000 HC RX 637 (ALT 250 FOR IP): Performed by: FAMILY MEDICINE

## 2018-10-01 PROCEDURE — 97161 PT EVAL LOW COMPLEX 20 MIN: CPT

## 2018-10-01 PROCEDURE — 97116 GAIT TRAINING THERAPY: CPT

## 2018-10-01 RX ORDER — CHOLECALCIFEROL (VITAMIN D3) 125 MCG
500 CAPSULE ORAL DAILY
Qty: 30 TABLET | Refills: 3 | Status: SHIPPED | OUTPATIENT
Start: 2018-10-01 | End: 2018-10-04 | Stop reason: SDUPTHER

## 2018-10-01 RX ADMIN — Medication 10 ML: at 08:26

## 2018-10-01 RX ADMIN — SODIUM CHLORIDE: 9 INJECTION, SOLUTION INTRAVENOUS at 10:19

## 2018-10-01 RX ADMIN — ESCITALOPRAM OXALATE 5 MG: 10 TABLET ORAL at 08:25

## 2018-10-01 RX ADMIN — CHOLECALCIFEROL TAB 25 MCG (1000 UNIT) 2000 UNITS: 25 TAB at 08:25

## 2018-10-01 RX ADMIN — ENOXAPARIN SODIUM 40 MG: 40 INJECTION SUBCUTANEOUS at 08:24

## 2018-10-01 RX ADMIN — MULTIPLE VITAMINS W/ MINERALS TAB 1 TABLET: TAB at 08:25

## 2018-10-01 RX ADMIN — LOSARTAN POTASSIUM 50 MG: 25 TABLET, FILM COATED ORAL at 08:24

## 2018-10-01 RX ADMIN — SODIUM CHLORIDE: 9 INJECTION, SOLUTION INTRAVENOUS at 00:19

## 2018-10-01 RX ADMIN — AMLODIPINE BESYLATE 5 MG: 5 TABLET ORAL at 08:25

## 2018-10-01 RX ADMIN — FLUTICASONE PROPIONATE 1 SPRAY: 50 SPRAY, METERED NASAL at 08:24

## 2018-10-01 RX ADMIN — ASPIRIN 81 MG CHEWABLE TABLET 81 MG: 81 TABLET CHEWABLE at 08:24

## 2018-10-01 ASSESSMENT — PAIN SCALES - GENERAL: PAINLEVEL_OUTOF10: 0

## 2018-10-01 NOTE — PROGRESS NOTES
4 Eyes Admission Assessment     I agree as the admission nurse that 2 RN's have performed a thorough Head to Toe Skin Assessment on the patient. ALL assessment sites listed below have been assessed on admission. Areas assessed by both nurses:   [x]   Head, Face, and Ears   [x]   Shoulders, Back, and Chest  [x]   Arms, Elbows, and Hands   [x]   Coccyx, Sacrum, and Ischum  [x]   Legs, Feet, and Heels        Does the Patient have Skin Breakdown?   No         Joshua Prevention initiated:  No   Wound Care Orders initiated:  No      [facility] nurse consulted for Pressure Injury (Stage 3,4, Unstageable, DTI, NWPT, and Complex wounds):  No      Nurse 1 eSignature: Electronically signed by Kailash Ocampo RN on 9/30/18 at 12:00 AM    **SHARE this note so that the co-signing nurse is able to place an eSignature**    Nurse 2 eSignature: {Esignature:[id number]}
Call from pt daughter in law, she stated family had concerns about pt being dizzy at times and short term memory loss. She stated pt was admitted for same complaints in October 2017 with no identifiable cause. Daughter in law stated that the memory problems began over 1 year ago and the dizzy spells have been for the last several months. She stated that the family has never addressed this with the PCP for the patient. Upon spending time with patient no memory loss has been observed and she has denied any changes neurologically, she has a steady gait with ambulation.
Hospitalist Progress Note      PCP: Ham Whitt MD    Date of Admission: 2018    Chief Complaint on Admission: dizziness    Pt Seen/Examined and Chart Reviewed. Admitting dx dizziness    SUBJECTIVE/ OBJECTIVE:   Patient reports her lightheadedness is much improved. She was able to ambulate. Several family members called, including her only son, and all were concerned about discharge today, also son reported rapid decline in her memory and cognition from baseline, in the past 2 weeks. She lives alone. Allergies  Nabumetone    Medications      Scheduled Meds:   amLODIPine  5 mg Oral Daily    aspirin  81 mg Oral Daily    vitamin D  2,000 Units Oral Daily    escitalopram  5 mg Oral Daily    fluticasone  1 spray Each Nare Daily    losartan  50 mg Oral Daily    therapeutic multivitamin-minerals  1 tablet Oral Daily    pravastatin  40 mg Oral Nightly    sodium chloride flush  10 mL Intravenous 2 times per day    enoxaparin  40 mg Subcutaneous Daily       Infusions:   sodium chloride 100 mL/hr at 18 0525       PRN Meds:  acetaminophen, sodium chloride flush, ondansetron    Vitals    TEMPERATURE:  Current - Temp: 98 °F (36.7 °C); Max - Temp  Av.2 °F (36.8 °C)  Min: 97.9 °F (36.6 °C)  Max: 98.8 °F (37.1 °C)  RESPIRATIONS RANGE: Resp  Av.6  Min: 16  Max: 19  PULSE RANGE: Pulse  Av.9  Min: 63  Max: 78  BLOOD PRESSURE RANGE:  Systolic (44SIN), ABO:738 , Min:122 , ZDZ:028   ; Diastolic (38DQK), JWC:16, Min:60, Max:79    PULSE OXIMETRY RANGE: SpO2  Av %  Min: 95 %  Max: 99 %  24HR INTAKE/OUTPUT:    Intake/Output Summary (Last 24 hours) at 18 1217  Last data filed at 18 2303   Gross per 24 hour   Intake              110 ml   Output                0 ml   Net              110 ml       Exam:      General appearance: No apparent distress, appears stated age and cooperative.   Lungs: Clear to ascultation, bilaterally without Rales/Wheezes/Rhonchi with good
Physical Therapy    Facility/Department: [facility] 5T ORTHO/NEURO  Initial Assessment and Treatment/Discharge    NAME: Gretchen Pinto  : 1935  MRN: [id number]    Date of Service: 10/1/2018    Discharge Recommendations: Gretchen Pinto scored a 22/24 on the AM-PAC short mobility form. At this time, no further PT is recommended upon discharge due to pt supervision with mobility. Recommend patient returns to prior setting with prior services. PT Equipment Recommendations  Equipment Needed: No    Patient Diagnosis(es): The primary encounter diagnosis was Pre-syncope. Diagnoses of Nausea and Near syncope were also pertinent to this visit. has a past medical history of Allergic rhinitis; Depression; DJD (degenerative joint disease); Hyperlipidemia; Hypertension; and Thrombocytopenia ([address] has a past surgical history that includes Breast lumpectomy; Hemorrhoid surgery; Hysterectomy; and Cataract removal with implant. Restrictions  Position Activity Restriction  Other position/activity restrictions: up with assist  Vision/Hearing  Vision: Within Functional Limits (wears glasses \"when I need them\")  Hearing: Within functional limits     Subjective  General  Chart Reviewed: Yes  Additional Pertinent Hx: Pt is an 81 y.o. female adm . Pt was feeling lightheadedness when standing up. CXR: neg. Head CT:neg. PMH: depression, DJD, hyperlipidemia, HTN  Referring Practitioner: Mimi Ascencio MD  Referral Date : 18  Subjective  Subjective: Pt found in supine. Agreeable to PT. \"I just feel like sometimes my eyes get blurry. \"    Pain Screening  Patient Currently in Pain: No  Vital Signs  Patient Currently in Pain: No       Orientation  Orientation  Overall Orientation Status: Within Functional Limits    Social/Functional History  Social/Functional History  Lives With: Alone  Type of Home: Apartment (just recently moved here a week ago)  Home Layout: One level (has laundry on same level)  Home
states her memory problems have been progressive for years, suggesting a less acute process. Plan:  -Can follow up with Dr. Ella White as an outpatient, no further inpatient neurologic workup.     NEPTALI Calloway-CNP  [phone]
from acute OT    G-Code  OT G-codes  Functional Limitation: Self care  Self Care Current Status (): At least 1 percent but less than 20 percent impaired, limited or restricted  Self Care Goal Status (): At least 1 percent but less than 20 percent impaired, limited or restricted  Self Care Discharge Status ():  At least 1 percent but less than 20 percent impaired, limited or restricted    AM-PAC Score        AM-PAC Inpatient Daily Activity Raw Score: 24  AM-PAC Inpatient ADL T-Scale Score : 57.54  ADL Inpatient CMS 0-100% Score: 0  ADL Inpatient CMS G-Code Modifier : [facility]    Goals    No goals indicated       Therapy Time   Individual Concurrent Group Co-treatment   Time In 1140         Time Out 1210         Minutes 30         Timed Code Treatment Minutes: 20 Minutes    Total Treatment Time:30    Saida Yañez OTR/L [id number]

## 2018-10-01 NOTE — DISCHARGE SUMMARY
Hospital Medicine Discharge Summary      Patient ID: Gareld Denver      Patient's PCP: Madonna Garcia MD    Admit Date: 9/29/2018     Discharge Date: 10/1/2018      Admitting Physician: Janes Rubio MD    Discharge Physician: Bere Burnette MD     Discharge Diagnoses: Active Hospital Problems    Diagnosis Date Noted    Cognitive decline [R41.89] 10/01/2018    Dehydration [E86.0] 10/01/2018    Lightheaded [R42] 09/29/2018    Fall at home [E11. Bernie Lainez, O91.662] 10/23/2017    HTN (hypertension) [I10] 06/15/2010    Hyperlipidemia [E78.5] 06/15/2010         The patient was seen and examined on day of discharge and this discharge summary is in conjunction with any daily progress note from day of discharge. Hospital Course:     80 y/o female with medical history of HTN, presented with acute dizziness as lightheadedness. Her orthostatics were negative in ER. Head CT non acute. Patient was given IVF and he symptoms resolved. Family reported worsening cognitive decline. TSH, RPR were normal. B12 low level of normal, was supplemented. Seen by neurology, dementia was suspected. Patient was referred to memory clinic at [facility]. Home care was set up on discharge. Consults:   Neurology     Disposition: Home     Discharged Condition: Stable    Code Status: Prior    Activity: activity as tolerated    Diet: cardiac diet    Follow Up: Primary Care Physician in one week    Exam:     General appearance: No apparent distress, appears stated age and cooperative. Lungs: Clear to ascultation, bilaterally without Rales/Wheezes/Rhonchi with good respiratory effort. Heart: Regular rate and rhythm with Normal S1/S2 with soft  Murmur  Abdomen: Soft, non-tender or non-distended without rigidity or guarding and positive bowel sounds all four quadrants. Extremities: No clubbing, cyanosis, or edema bilaterally.   Skin: Skin color, texture, turgor normal.   Neurologic: Alert and oriented X 3,  grossly non-focal.  Mental status:

## 2018-10-02 ENCOUNTER — TELEPHONE (OUTPATIENT)
Dept: INTERNAL MEDICINE CLINIC | Age: 83
End: 2018-10-02

## 2018-10-03 LAB — METHYLMALONIC ACID: 0.18 UMOL/L (ref 0–0.4)

## 2018-10-04 ENCOUNTER — OFFICE VISIT (OUTPATIENT)
Dept: INTERNAL MEDICINE CLINIC | Age: 83
End: 2018-10-04
Payer: MEDICARE

## 2018-10-04 VITALS
TEMPERATURE: 98.7 F | DIASTOLIC BLOOD PRESSURE: 80 MMHG | HEIGHT: 66 IN | HEART RATE: 70 BPM | BODY MASS INDEX: 22.43 KG/M2 | SYSTOLIC BLOOD PRESSURE: 126 MMHG | WEIGHT: 139.6 LBS | OXYGEN SATURATION: 96 %

## 2018-10-04 DIAGNOSIS — F32.89 OTHER DEPRESSION: ICD-10-CM

## 2018-10-04 DIAGNOSIS — R41.89 COGNITIVE DECLINE: ICD-10-CM

## 2018-10-04 DIAGNOSIS — R53.83 OTHER FATIGUE: ICD-10-CM

## 2018-10-04 DIAGNOSIS — E55.9 VITAMIN D DEFICIENCY: ICD-10-CM

## 2018-10-04 DIAGNOSIS — E78.49 OTHER HYPERLIPIDEMIA: ICD-10-CM

## 2018-10-04 DIAGNOSIS — I10 ESSENTIAL HYPERTENSION: ICD-10-CM

## 2018-10-04 DIAGNOSIS — R42 DIZZINESS: Primary | ICD-10-CM

## 2018-10-04 DIAGNOSIS — D69.6 THROMBOCYTOPENIA (HCC): ICD-10-CM

## 2018-10-04 PROCEDURE — 1111F DSCHRG MED/CURRENT MED MERGE: CPT | Performed by: INTERNAL MEDICINE

## 2018-10-04 PROCEDURE — 99495 TRANSJ CARE MGMT MOD F2F 14D: CPT | Performed by: INTERNAL MEDICINE

## 2018-10-04 RX ORDER — IRBESARTAN 150 MG/1
150 TABLET ORAL DAILY
Qty: 90 TABLET | Refills: 0 | Status: SHIPPED | OUTPATIENT
Start: 2018-10-04 | End: 2019-01-10 | Stop reason: SDUPTHER

## 2018-10-04 RX ORDER — CHOLECALCIFEROL (VITAMIN D3) 125 MCG
500 CAPSULE ORAL DAILY
Qty: 30 TABLET | Refills: 3 | Status: SHIPPED | OUTPATIENT
Start: 2018-10-04 | End: 2022-04-27 | Stop reason: CLARIF

## 2018-10-04 NOTE — PATIENT INSTRUCTIONS
TAKE MED AS ADVISED    DIET/ EXERCISE.     FOLLOW UP PREVIOUS / AS NEEDED    FOLLOW UP WITH MEMORY CLINIC

## 2018-10-04 NOTE — PROGRESS NOTES
Post-Discharge Transitional Care Management Services or Hospital Follow Up      PT HERE FOR EVAL / S/P HOSPITALIZATION      Paris Bhatti   YOB: 1935    Date of Office Visit:  10/4/2018  Date of Hospital Admission: 9/29/18  Date of Hospital Discharge: 10/1/18  Risk of hospital readmission (high >=14%.  Medium >=10%) :Readmission Risk Score: 0    Care management risk score Rising risk (score 2-5) and Complex Care (Scores >=6): 0     Non face to face  following discharge, date last encounter closed (first attempt may have been earlier): 10/2/2018  7:52 PM    Call initiated 2 business days of discharge: Yes    Patient Active Problem List   Diagnosis    HTN (hypertension)    Hyperlipidemia    Allergic rhinitis    Depression    DJD (degenerative joint disease)    Vitamin D deficiency    Low back pain    Muscle cramp    Shoulder pain    Cataract, left    Wrist pain, right / RT THUMB PAIN    Shoulder pain, bilateral    Heart murmur previously undiagnosed    Bilateral shoulder pain    Thrombocytopenia (HCC)    Chronic rhinitis    Bilateral sensorineural hearing loss    Lumbar sprain    Pulmonary nodule/ ABN CT    Vertigo    Fall at home    Loss of balance    Gallstones    Lightheaded    Cognitive decline    Dehydration       Allergies   Allergen Reactions    Nabumetone Swelling     Ankle       Medications listed as ordered at the time of discharge from hospital   Shawn RAYMUNDO   Home Medication Instructions BULMARO:    Printed on:10/04/18 1141   Medication Information                      acetaminophen (TYLENOL) 325 MG tablet  Take 2 tablets by mouth every 4 hours as needed for Pain             amLODIPine (NORVASC) 5 MG tablet  Take 1 tablet by mouth daily             aspirin 81 MG tablet  Take 81 mg by mouth daily             Cholecalciferol (VITAMIN D) 2000 units CAPS capsule  Take 1 capsule by mouth daily             escitalopram (LEXAPRO) 5 MG tablet  Take 5 mg by mouth  Follow-Up from [address] Hypertension       History of Present illness - Follow up of Hospital diagnosis(es): DIZZINESS / LIGHTHEADEDNESS  PT ADMITT[facility]. WITH DIZZINESS / LIGHTHEADED. S/P IV HYDRATION - STATES SYMPTOMS IMPROVED  CONCERN FOR COGNITIVE DECLINE - S/P EVAL IN HOSPITAL. PT REFERRED MEMORY CLINIC. PT STARTED ON B12 - HAS NO BEEN TAKING MED    HTN - TAKING MEDS. NO HEADACHE , [place] Yes / DIET Yes COMPLIANCE . OCC MUSCLE CRAMPS / NO MUSCLE ACHES  VIT D DEF - TAKING MED. LABS D/W PT  DEPRESSION- CHANGED TO LEXAPRO - PER PSYCH. OCC INSOMNIA. DENIES SUICIDAL / NO HOMICIDAL THOUGHTS / IDEATION . PT FOLLOWING ALSO WITH PSYCH  THROMBOCYTOPENIA - CHRONIC.   NO BRUISING  FATIGUE - PERSISTENT. ? NO COLD / HEAT INTOLERANCE    DENIES CP, No SOB, No PALPITATIONS, No COUGH  No ABD PAIN, No N/V, No DIARRHEA, No CONSTIPATION, No MELENA, No HEMATOCHEZIA. No DYSURIA, No FREQ, No URGENCY, No HEMATURIA    ROS: COMPREHENSIVE ROS AS IN HX, REST -VE  History obtained from chart review and the patient    Inpatient course: Discharge summary reviewed- see chart. Interval history/Current status: STABLE    A comprehensive review of systems was negative except for what was noted in the HPI. Vitals:    10/04/18 1116   BP: 138/70   Site: Left Upper Arm   Position: Sitting   Cuff Size: Medium Adult   Pulse: 70   Temp: 98.7 °F (37.1 °C)   SpO2: 96%   Weight: 139 lb 9.6 oz (63.3 kg)   Height: 5' 6\" (1.676 m)     Body mass index is 22.53 kg/m².    Wt Readings from Last 3 Encounters:   10/04/18 139 lb 9.6 oz (63.3 kg)   09/29/18 140 lb 12.8 oz (63.9 kg)   09/13/18 138 lb 12.8 oz (63 kg)     BP Readings from Last 3 Encounters:   10/04/18 138/70   10/01/18 (!) 163/69   09/13/18 130/80        Physical Exam: SEE BELOW      OBJECTIVE:   NURSING NOTE AND VITALS REVIEWED  /70 (Site: Left Upper Arm, Position: Sitting, Cuff Size: Medium Adult)   Pulse 70   Temp 98.7 °F (37.1 °C)   Ht 5'

## 2018-10-19 LAB
EKG ATRIAL RATE: 62 BPM
EKG DIAGNOSIS: NORMAL
EKG P AXIS: 59 DEGREES
EKG P-R INTERVAL: 206 MS
EKG Q-T INTERVAL: 394 MS
EKG QRS DURATION: 94 MS
EKG QTC CALCULATION (BAZETT): 399 MS
EKG R AXIS: -51 DEGREES
EKG T AXIS: 33 DEGREES
EKG VENTRICULAR RATE: 62 BPM

## 2018-10-31 PROBLEM — E86.0 DEHYDRATION: Status: RESOLVED | Noted: 2018-10-01 | Resolved: 2018-10-31

## 2018-11-05 DIAGNOSIS — J30.89 OTHER ALLERGIC RHINITIS: ICD-10-CM

## 2018-11-05 RX ORDER — FLUTICASONE PROPIONATE 50 MCG
SPRAY, SUSPENSION (ML) NASAL
Qty: 1 BOTTLE | Refills: 0 | Status: SHIPPED | OUTPATIENT
Start: 2018-11-05 | End: 2019-04-04 | Stop reason: SDUPTHER

## 2018-11-27 ENCOUNTER — TELEPHONE (OUTPATIENT)
Dept: INTERNAL MEDICINE CLINIC | Age: 83
End: 2018-11-27

## 2018-11-30 ENCOUNTER — OFFICE VISIT (OUTPATIENT)
Dept: INTERNAL MEDICINE CLINIC | Age: 83
End: 2018-11-30
Payer: MEDICARE

## 2018-11-30 VITALS
HEART RATE: 72 BPM | WEIGHT: 142.8 LBS | OXYGEN SATURATION: 92 % | SYSTOLIC BLOOD PRESSURE: 130 MMHG | TEMPERATURE: 98.3 F | HEIGHT: 66 IN | BODY MASS INDEX: 22.95 KG/M2 | DIASTOLIC BLOOD PRESSURE: 78 MMHG

## 2018-11-30 DIAGNOSIS — E55.9 VITAMIN D DEFICIENCY: ICD-10-CM

## 2018-11-30 DIAGNOSIS — D69.6 THROMBOCYTOPENIA (HCC): ICD-10-CM

## 2018-11-30 DIAGNOSIS — E78.49 OTHER HYPERLIPIDEMIA: ICD-10-CM

## 2018-11-30 DIAGNOSIS — R53.83 OTHER FATIGUE: ICD-10-CM

## 2018-11-30 DIAGNOSIS — J30.89 OTHER ALLERGIC RHINITIS: ICD-10-CM

## 2018-11-30 DIAGNOSIS — I10 ESSENTIAL HYPERTENSION: Primary | ICD-10-CM

## 2018-11-30 DIAGNOSIS — Z23 NEED FOR IMMUNIZATION AGAINST INFLUENZA: ICD-10-CM

## 2018-11-30 DIAGNOSIS — R42 DIZZINESS: ICD-10-CM

## 2018-11-30 DIAGNOSIS — F32.89 OTHER DEPRESSION: ICD-10-CM

## 2018-11-30 DIAGNOSIS — Z77.098 EXPOSURE TO CHEMICAL INHALATION: ICD-10-CM

## 2018-11-30 DIAGNOSIS — R41.89 COGNITIVE DECLINE: ICD-10-CM

## 2018-11-30 PROCEDURE — 4040F PNEUMOC VAC/ADMIN/RCVD: CPT | Performed by: INTERNAL MEDICINE

## 2018-11-30 PROCEDURE — 1123F ACP DISCUSS/DSCN MKR DOCD: CPT | Performed by: INTERNAL MEDICINE

## 2018-11-30 PROCEDURE — G8482 FLU IMMUNIZE ORDER/ADMIN: HCPCS | Performed by: INTERNAL MEDICINE

## 2018-11-30 PROCEDURE — G8420 CALC BMI NORM PARAMETERS: HCPCS | Performed by: INTERNAL MEDICINE

## 2018-11-30 PROCEDURE — 1036F TOBACCO NON-USER: CPT | Performed by: INTERNAL MEDICINE

## 2018-11-30 PROCEDURE — 99214 OFFICE O/P EST MOD 30 MIN: CPT | Performed by: INTERNAL MEDICINE

## 2018-11-30 PROCEDURE — 1101F PT FALLS ASSESS-DOCD LE1/YR: CPT | Performed by: INTERNAL MEDICINE

## 2018-11-30 PROCEDURE — 1090F PRES/ABSN URINE INCON ASSESS: CPT | Performed by: INTERNAL MEDICINE

## 2018-11-30 PROCEDURE — G8400 PT W/DXA NO RESULTS DOC: HCPCS | Performed by: INTERNAL MEDICINE

## 2018-11-30 PROCEDURE — G8427 DOCREV CUR MEDS BY ELIG CLIN: HCPCS | Performed by: INTERNAL MEDICINE

## 2018-11-30 RX ORDER — MULTIVIT-MIN/IRON/FOLIC ACID/K 18-600-40
1 CAPSULE ORAL DAILY
Qty: 90 CAPSULE | Refills: 0 | Status: SHIPPED | OUTPATIENT
Start: 2018-11-30 | End: 2019-01-10 | Stop reason: SDUPTHER

## 2018-11-30 RX ORDER — AMLODIPINE BESYLATE 5 MG/1
5 TABLET ORAL DAILY
Qty: 90 TABLET | Refills: 0 | Status: SHIPPED | OUTPATIENT
Start: 2018-11-30 | End: 2019-04-04 | Stop reason: SDUPTHER

## 2018-11-30 RX ORDER — PRAVASTATIN SODIUM 40 MG
40 TABLET ORAL NIGHTLY
Qty: 90 TABLET | Refills: 0 | Status: SHIPPED | OUTPATIENT
Start: 2018-11-30 | End: 2019-04-04 | Stop reason: SDUPTHER

## 2018-11-30 RX ORDER — FEXOFENADINE HCL 180 MG/1
180 TABLET ORAL DAILY PRN
Qty: 30 TABLET | Refills: 3 | Status: SHIPPED | OUTPATIENT
Start: 2018-11-30 | End: 2019-04-04 | Stop reason: SDUPTHER

## 2018-11-30 NOTE — PROGRESS NOTES
by mouth daily Yes Gal Kim MD   amLODIPine (NORVASC) 5 MG tablet Take 1 tablet by mouth daily Yes Gal Kim MD   acetaminophen (TYLENOL) 325 MG tablet Take 2 tablets by mouth every 4 hours as needed for Pain Yes Jadine Gilford, MD   fexofenadine (ALLEGRA) 180 MG tablet Take 1 tablet by mouth daily as needed (AS NEEDED) Yes Gal Kim MD   aspirin 81 MG tablet Take 81 mg by mouth daily Yes Historical Provider, MD   Multiple Vitamins-Minerals (THERAPEUTIC MULTIVITAMIN-MINERALS) tablet Take 1 tablet by mouth daily Yes Gal Kim MD       ROS: COMPREHENSIVE ROS AS IN HX, REST -VE  History obtained from chart review and the patient    OBJECTIVE:   NURSING NOTE AND VITALS REVIEWED  /70 (Site: Left Upper Arm, Position: Sitting, Cuff Size: Medium Adult)   Pulse 72   Temp 98.3 °F (36.8 °C)   Ht 5' 6\" (1.676 m)   Wt 142 lb 12.8 oz (64.8 kg)   SpO2 92%   Breastfeeding? No   BMI 23.05 kg/m²     NO ACUTE DISTRESS    REPEAT BP: 130/78 (LT), NO ORTHOSTASIS     Body mass index is 23.05 kg/m². HEENT: NO PALLOR, ANICTERIC, PERRLA, EOMI, NO CONJUNCTIVAL ERYTHEMA,                 NO SINUS TENDERNESS  NECK:  SUPPLE, TRACHEA MIDLINE, NT, NO JVD, NO CB, NO LA, NO TM, NO STIFFNESS  CHEST: RESPY EFFORT NL, GOOD AE, NO W/R/C  HEART: S1S2+ REG, NO M/G/R  ABD: SOFT, NT, NO HSM, BS+  EXT: NO EDEMA, NT, PULSES +. TWAN'S -VE  NEURO: ALERT AND ORIENTED X 3, NO MENINGEAL SIGNS, NO TREMORS, NL GAIT, NO FOCAL DEFICITS  PSYCH: OCC DEPRESSED AFFECT 2/3. BACK: NT, NO ROM, NO CVA TENDERNESS    PREVIOUS LABS REVIEWED AND D/W PT       ASSESSMENT / PLAN:     Diagnosis Orders   1. Essential hypertension  COUNSELLED. CONTINUE MEDS. ADVISED LOW NA+ / DASH DIET/ EXERCISE. MONITOR. GOAL </= 120/80  F/U LABS  MAKE CHANGES AS NEEDED. 2. Other hyperlipidemia  COUNSELLED. CONTINUE PRAVACHOL 40 MG  ADVISED LOW FAT / CHOL DIET/ EXERCISE.  MONITOR. F/U LABS  GOALS D/W PT.  MAKE CHANGES AS NEEDED.        3. Dizziness

## 2018-12-03 PROCEDURE — 90662 IIV NO PRSV INCREASED AG IM: CPT | Performed by: INTERNAL MEDICINE

## 2018-12-03 PROCEDURE — G0008 ADMIN INFLUENZA VIRUS VAC: HCPCS | Performed by: INTERNAL MEDICINE

## 2018-12-06 DIAGNOSIS — R53.83 OTHER FATIGUE: ICD-10-CM

## 2018-12-06 DIAGNOSIS — Z77.098 EXPOSURE TO CHEMICAL INHALATION: ICD-10-CM

## 2018-12-06 DIAGNOSIS — E78.49 OTHER HYPERLIPIDEMIA: ICD-10-CM

## 2018-12-06 DIAGNOSIS — I10 ESSENTIAL HYPERTENSION: ICD-10-CM

## 2018-12-06 DIAGNOSIS — R42 DIZZINESS: ICD-10-CM

## 2018-12-06 LAB
A/G RATIO: 2.1 (ref 1.1–2.2)
ALBUMIN SERPL-MCNC: 4.4 G/DL (ref 3.4–5)
ALP BLD-CCNC: 70 U/L (ref 40–129)
ALT SERPL-CCNC: 10 U/L (ref 10–40)
ANION GAP SERPL CALCULATED.3IONS-SCNC: 13 MMOL/L (ref 3–16)
AST SERPL-CCNC: 14 U/L (ref 15–37)
BASOPHILS ABSOLUTE: 0 K/UL (ref 0–0.2)
BASOPHILS RELATIVE PERCENT: 0.5 %
BILIRUB SERPL-MCNC: 1.1 MG/DL (ref 0–1)
BUN BLDV-MCNC: 10 MG/DL (ref 7–20)
CALCIUM SERPL-MCNC: 9.6 MG/DL (ref 8.3–10.6)
CHLORIDE BLD-SCNC: 104 MMOL/L (ref 99–110)
CO2: 28 MMOL/L (ref 21–32)
CREAT SERPL-MCNC: 1 MG/DL (ref 0.6–1.2)
EOSINOPHILS ABSOLUTE: 0 K/UL (ref 0–0.6)
EOSINOPHILS RELATIVE PERCENT: 0.4 %
GFR AFRICAN AMERICAN: >60
GFR NON-AFRICAN AMERICAN: 53
GLOBULIN: 2.1 G/DL
GLUCOSE BLD-MCNC: 93 MG/DL (ref 70–99)
HCT VFR BLD CALC: 40.3 % (ref 36–48)
HEMOGLOBIN: 12.6 G/DL (ref 12–16)
LYMPHOCYTES ABSOLUTE: 1.1 K/UL (ref 1–5.1)
LYMPHOCYTES RELATIVE PERCENT: 22.4 %
MCH RBC QN AUTO: 26.1 PG (ref 26–34)
MCHC RBC AUTO-ENTMCNC: 31.3 G/DL (ref 31–36)
MCV RBC AUTO: 83.3 FL (ref 80–100)
MONOCYTES ABSOLUTE: 0.3 K/UL (ref 0–1.3)
MONOCYTES RELATIVE PERCENT: 7.2 %
NEUTROPHILS ABSOLUTE: 3.4 K/UL (ref 1.7–7.7)
NEUTROPHILS RELATIVE PERCENT: 69.5 %
PDW BLD-RTO: 14.6 % (ref 12.4–15.4)
PLATELET # BLD: 118 K/UL (ref 135–450)
PMV BLD AUTO: 11.1 FL (ref 5–10.5)
POTASSIUM SERPL-SCNC: 4.3 MMOL/L (ref 3.5–5.1)
RBC # BLD: 4.84 M/UL (ref 4–5.2)
SODIUM BLD-SCNC: 145 MMOL/L (ref 136–145)
TOTAL PROTEIN: 6.5 G/DL (ref 6.4–8.2)
WBC # BLD: 4.9 K/UL (ref 4–11)

## 2018-12-09 LAB
ARSENIC BLOOD: <10 UG/L (ref 0–12)
LEAD LEVEL BLOOD: <2 UG/DL (ref 0–4.9)
MERCURY BLOOD: <2.5 UG/L (ref 0–10)

## 2019-01-10 ENCOUNTER — OFFICE VISIT (OUTPATIENT)
Dept: INTERNAL MEDICINE CLINIC | Age: 84
End: 2019-01-10
Payer: MEDICARE

## 2019-01-10 VITALS
BODY MASS INDEX: 22.34 KG/M2 | TEMPERATURE: 97.4 F | DIASTOLIC BLOOD PRESSURE: 80 MMHG | SYSTOLIC BLOOD PRESSURE: 124 MMHG | WEIGHT: 139 LBS | HEART RATE: 69 BPM | HEIGHT: 66 IN | OXYGEN SATURATION: 98 %

## 2019-01-10 DIAGNOSIS — I10 ESSENTIAL HYPERTENSION: Primary | ICD-10-CM

## 2019-01-10 DIAGNOSIS — E78.49 OTHER HYPERLIPIDEMIA: ICD-10-CM

## 2019-01-10 DIAGNOSIS — J30.89 OTHER ALLERGIC RHINITIS: ICD-10-CM

## 2019-01-10 DIAGNOSIS — E55.9 VITAMIN D DEFICIENCY: ICD-10-CM

## 2019-01-10 DIAGNOSIS — N39.0 ACUTE UTI: ICD-10-CM

## 2019-01-10 DIAGNOSIS — F32.89 OTHER DEPRESSION: ICD-10-CM

## 2019-01-10 DIAGNOSIS — D69.6 THROMBOCYTOPENIA (HCC): ICD-10-CM

## 2019-01-10 DIAGNOSIS — R42 DIZZINESS: ICD-10-CM

## 2019-01-10 LAB
BILIRUBIN, POC: NORMAL
BLOOD URINE, POC: NORMAL
CLARITY, POC: NORMAL
COLOR, POC: YELLOW
GLUCOSE URINE, POC: NORMAL
KETONES, POC: NORMAL
LEUKOCYTE EST, POC: NORMAL
NITRITE, POC: NORMAL
PH, POC: 5.5
PROTEIN, POC: NORMAL
SPECIFIC GRAVITY, POC: 1.02
UROBILINOGEN, POC: 0.2

## 2019-01-10 PROCEDURE — 99214 OFFICE O/P EST MOD 30 MIN: CPT | Performed by: INTERNAL MEDICINE

## 2019-01-10 PROCEDURE — 1123F ACP DISCUSS/DSCN MKR DOCD: CPT | Performed by: INTERNAL MEDICINE

## 2019-01-10 PROCEDURE — G8400 PT W/DXA NO RESULTS DOC: HCPCS | Performed by: INTERNAL MEDICINE

## 2019-01-10 PROCEDURE — 1101F PT FALLS ASSESS-DOCD LE1/YR: CPT | Performed by: INTERNAL MEDICINE

## 2019-01-10 PROCEDURE — 1036F TOBACCO NON-USER: CPT | Performed by: INTERNAL MEDICINE

## 2019-01-10 PROCEDURE — G8482 FLU IMMUNIZE ORDER/ADMIN: HCPCS | Performed by: INTERNAL MEDICINE

## 2019-01-10 PROCEDURE — 1090F PRES/ABSN URINE INCON ASSESS: CPT | Performed by: INTERNAL MEDICINE

## 2019-01-10 PROCEDURE — G8427 DOCREV CUR MEDS BY ELIG CLIN: HCPCS | Performed by: INTERNAL MEDICINE

## 2019-01-10 PROCEDURE — G8420 CALC BMI NORM PARAMETERS: HCPCS | Performed by: INTERNAL MEDICINE

## 2019-01-10 PROCEDURE — 4040F PNEUMOC VAC/ADMIN/RCVD: CPT | Performed by: INTERNAL MEDICINE

## 2019-01-10 PROCEDURE — 81002 URINALYSIS NONAUTO W/O SCOPE: CPT | Performed by: INTERNAL MEDICINE

## 2019-01-10 RX ORDER — MULTIVIT-MIN/IRON/FOLIC ACID/K 18-600-40
1 CAPSULE ORAL DAILY
Qty: 90 CAPSULE | Refills: 0 | Status: SHIPPED | OUTPATIENT
Start: 2019-01-10 | End: 2019-04-04 | Stop reason: SDUPTHER

## 2019-01-10 RX ORDER — CIPROFLOXACIN 250 MG/1
250 TABLET, FILM COATED ORAL 2 TIMES DAILY
Qty: 6 TABLET | Refills: 0 | Status: SHIPPED | OUTPATIENT
Start: 2019-01-10 | End: 2019-01-13

## 2019-01-10 RX ORDER — IRBESARTAN 150 MG/1
150 TABLET ORAL DAILY
Qty: 90 TABLET | Refills: 0 | Status: SHIPPED | OUTPATIENT
Start: 2019-01-10 | End: 2019-03-25 | Stop reason: RX

## 2019-01-11 LAB — URINE CULTURE, ROUTINE: NORMAL

## 2019-01-16 ENCOUNTER — TELEPHONE (OUTPATIENT)
Dept: INTERNAL MEDICINE CLINIC | Age: 84
End: 2019-01-16

## 2019-03-22 ENCOUNTER — TELEPHONE (OUTPATIENT)
Dept: INTERNAL MEDICINE CLINIC | Age: 84
End: 2019-03-22

## 2019-03-25 ENCOUNTER — TELEPHONE (OUTPATIENT)
Dept: INTERNAL MEDICINE CLINIC | Age: 84
End: 2019-03-25

## 2019-03-25 RX ORDER — TELMISARTAN 40 MG/1
40 TABLET ORAL DAILY
Qty: 30 TABLET | Refills: 3 | Status: SHIPPED | OUTPATIENT
Start: 2019-03-25 | End: 2019-06-04 | Stop reason: SDUPTHER

## 2019-04-04 ENCOUNTER — OFFICE VISIT (OUTPATIENT)
Dept: INTERNAL MEDICINE CLINIC | Age: 84
End: 2019-04-04
Payer: MEDICARE

## 2019-04-04 VITALS
HEART RATE: 65 BPM | BODY MASS INDEX: 22.82 KG/M2 | SYSTOLIC BLOOD PRESSURE: 130 MMHG | OXYGEN SATURATION: 96 % | WEIGHT: 142 LBS | DIASTOLIC BLOOD PRESSURE: 78 MMHG | HEIGHT: 66 IN

## 2019-04-04 DIAGNOSIS — D69.6 THROMBOCYTOPENIA (HCC): ICD-10-CM

## 2019-04-04 DIAGNOSIS — E78.49 OTHER HYPERLIPIDEMIA: ICD-10-CM

## 2019-04-04 DIAGNOSIS — J30.89 OTHER ALLERGIC RHINITIS: ICD-10-CM

## 2019-04-04 DIAGNOSIS — I10 ESSENTIAL HYPERTENSION: ICD-10-CM

## 2019-04-04 DIAGNOSIS — R39.89 ABNORMAL URINE COLOR: ICD-10-CM

## 2019-04-04 DIAGNOSIS — F32.89 OTHER DEPRESSION: ICD-10-CM

## 2019-04-04 DIAGNOSIS — E55.9 VITAMIN D DEFICIENCY: ICD-10-CM

## 2019-04-04 DIAGNOSIS — M25.511 ACUTE PAIN OF BOTH SHOULDERS: ICD-10-CM

## 2019-04-04 DIAGNOSIS — Z83.3 FAMILY HISTORY OF DIABETES MELLITUS (DM): ICD-10-CM

## 2019-04-04 DIAGNOSIS — M25.511 ACUTE PAIN OF BOTH SHOULDERS: Primary | ICD-10-CM

## 2019-04-04 DIAGNOSIS — M25.512 ACUTE PAIN OF BOTH SHOULDERS: ICD-10-CM

## 2019-04-04 DIAGNOSIS — M25.512 ACUTE PAIN OF BOTH SHOULDERS: Primary | ICD-10-CM

## 2019-04-04 LAB
A/G RATIO: 1.6 (ref 1.1–2.2)
ALBUMIN SERPL-MCNC: 4.4 G/DL (ref 3.4–5)
ALP BLD-CCNC: 68 U/L (ref 40–129)
ALT SERPL-CCNC: 10 U/L (ref 10–40)
ANION GAP SERPL CALCULATED.3IONS-SCNC: 8 MMOL/L (ref 3–16)
AST SERPL-CCNC: 15 U/L (ref 15–37)
BASOPHILS ABSOLUTE: 0 K/UL (ref 0–0.2)
BASOPHILS RELATIVE PERCENT: 0.5 %
BILIRUB SERPL-MCNC: 1 MG/DL (ref 0–1)
BILIRUBIN URINE: NEGATIVE
BLOOD, URINE: NEGATIVE
BUN BLDV-MCNC: 12 MG/DL (ref 7–20)
CALCIUM SERPL-MCNC: 9.5 MG/DL (ref 8.3–10.6)
CHLORIDE BLD-SCNC: 102 MMOL/L (ref 99–110)
CHOLESTEROL, TOTAL: 218 MG/DL (ref 0–199)
CLARITY: CLEAR
CO2: 30 MMOL/L (ref 21–32)
COLOR: YELLOW
CREAT SERPL-MCNC: 0.9 MG/DL (ref 0.6–1.2)
CREATININE URINE: 209.3 MG/DL (ref 28–259)
EOSINOPHILS ABSOLUTE: 0 K/UL (ref 0–0.6)
EOSINOPHILS RELATIVE PERCENT: 0.6 %
GFR AFRICAN AMERICAN: >60
GFR NON-AFRICAN AMERICAN: 60
GLOBULIN: 2.8 G/DL
GLUCOSE BLD-MCNC: 91 MG/DL (ref 70–99)
GLUCOSE URINE: NEGATIVE MG/DL
HCT VFR BLD CALC: 39.6 % (ref 36–48)
HDLC SERPL-MCNC: 55 MG/DL (ref 40–60)
HEMOGLOBIN: 12.6 G/DL (ref 12–16)
KETONES, URINE: NEGATIVE MG/DL
LDL CHOLESTEROL CALCULATED: 137 MG/DL
LEUKOCYTE ESTERASE, URINE: NEGATIVE
LYMPHOCYTES ABSOLUTE: 1.5 K/UL (ref 1–5.1)
LYMPHOCYTES RELATIVE PERCENT: 27.8 %
MCH RBC QN AUTO: 27.1 PG (ref 26–34)
MCHC RBC AUTO-ENTMCNC: 31.8 G/DL (ref 31–36)
MCV RBC AUTO: 85.2 FL (ref 80–100)
MICROALBUMIN UR-MCNC: <1.2 MG/DL
MICROALBUMIN/CREAT UR-RTO: NORMAL MG/G (ref 0–30)
MICROSCOPIC EXAMINATION: NORMAL
MONOCYTES ABSOLUTE: 0.4 K/UL (ref 0–1.3)
MONOCYTES RELATIVE PERCENT: 8.1 %
NEUTROPHILS ABSOLUTE: 3.3 K/UL (ref 1.7–7.7)
NEUTROPHILS RELATIVE PERCENT: 63 %
NITRITE, URINE: NEGATIVE
PDW BLD-RTO: 14.2 % (ref 12.4–15.4)
PH UA: 5.5 (ref 5–8)
PLATELET # BLD: 141 K/UL (ref 135–450)
PMV BLD AUTO: 10.7 FL (ref 5–10.5)
POTASSIUM SERPL-SCNC: 4.5 MMOL/L (ref 3.5–5.1)
PROTEIN UA: NEGATIVE MG/DL
RBC # BLD: 4.65 M/UL (ref 4–5.2)
SODIUM BLD-SCNC: 140 MMOL/L (ref 136–145)
SPECIFIC GRAVITY UA: 1.03 (ref 1–1.03)
TOTAL PROTEIN: 7.2 G/DL (ref 6.4–8.2)
TRIGL SERPL-MCNC: 128 MG/DL (ref 0–150)
URINE TYPE: NORMAL
UROBILINOGEN, URINE: 0.2 E.U./DL
VITAMIN D 25-HYDROXY: 22 NG/ML
VLDLC SERPL CALC-MCNC: 26 MG/DL
WBC # BLD: 5.3 K/UL (ref 4–11)

## 2019-04-04 PROCEDURE — 99214 OFFICE O/P EST MOD 30 MIN: CPT | Performed by: INTERNAL MEDICINE

## 2019-04-04 PROCEDURE — G8420 CALC BMI NORM PARAMETERS: HCPCS | Performed by: INTERNAL MEDICINE

## 2019-04-04 PROCEDURE — 1090F PRES/ABSN URINE INCON ASSESS: CPT | Performed by: INTERNAL MEDICINE

## 2019-04-04 PROCEDURE — 1036F TOBACCO NON-USER: CPT | Performed by: INTERNAL MEDICINE

## 2019-04-04 PROCEDURE — 4040F PNEUMOC VAC/ADMIN/RCVD: CPT | Performed by: INTERNAL MEDICINE

## 2019-04-04 PROCEDURE — G8427 DOCREV CUR MEDS BY ELIG CLIN: HCPCS | Performed by: INTERNAL MEDICINE

## 2019-04-04 PROCEDURE — 1123F ACP DISCUSS/DSCN MKR DOCD: CPT | Performed by: INTERNAL MEDICINE

## 2019-04-04 PROCEDURE — G8400 PT W/DXA NO RESULTS DOC: HCPCS | Performed by: INTERNAL MEDICINE

## 2019-04-04 RX ORDER — AMLODIPINE BESYLATE 5 MG/1
5 TABLET ORAL DAILY
Qty: 90 TABLET | Refills: 0 | Status: SHIPPED | OUTPATIENT
Start: 2019-04-04 | End: 2019-07-15 | Stop reason: SDUPTHER

## 2019-04-04 RX ORDER — PRAVASTATIN SODIUM 40 MG
40 TABLET ORAL NIGHTLY
Qty: 90 TABLET | Refills: 0 | Status: SHIPPED | OUTPATIENT
Start: 2019-04-04 | End: 2019-07-15 | Stop reason: SDUPTHER

## 2019-04-04 RX ORDER — FEXOFENADINE HCL 180 MG/1
180 TABLET ORAL DAILY PRN
Qty: 30 TABLET | Refills: 3 | Status: SHIPPED | OUTPATIENT
Start: 2019-04-04 | End: 2020-01-16 | Stop reason: SDUPTHER

## 2019-04-04 RX ORDER — MULTIVIT-MIN/IRON/FOLIC ACID/K 18-600-40
1 CAPSULE ORAL DAILY
Qty: 90 CAPSULE | Refills: 0 | Status: SHIPPED | OUTPATIENT
Start: 2019-04-04 | End: 2019-10-15 | Stop reason: SDUPTHER

## 2019-04-04 RX ORDER — FLUTICASONE PROPIONATE 50 MCG
SPRAY, SUSPENSION (ML) NASAL
Qty: 1 BOTTLE | Refills: 0 | Status: SHIPPED | OUTPATIENT
Start: 2019-04-04 | End: 2019-10-04 | Stop reason: SDUPTHER

## 2019-04-04 ASSESSMENT — PATIENT HEALTH QUESTIONNAIRE - PHQ9
1. LITTLE INTEREST OR PLEASURE IN DOING THINGS: 0
SUM OF ALL RESPONSES TO PHQ9 QUESTIONS 1 & 2: 0
SUM OF ALL RESPONSES TO PHQ QUESTIONS 1-9: 0
2. FEELING DOWN, DEPRESSED OR HOPELESS: 0
SUM OF ALL RESPONSES TO PHQ QUESTIONS 1-9: 0

## 2019-04-04 NOTE — PROGRESS NOTES
SUBJECTIVE:  Justino Sánchez is a 81 y.o. female [address]   Chief Complaint   Patient presents with    3 Month Follow-Up    Hypertension          PT HERE FOR EVAL    C/O DELPHINE SHOULDER PAIN - PAST FEW WEEKS. ? Yvette Juniper. NO RAD. PAIN SCALE 7/10. NO NUMBNESS / TINGLING. DENIES TRAUMA / NO FALL. STATES TYLENOL HELPING    HTN - TAKING MEDS - TOLERATING MEDS. NO HEADACHE , [address] Yes / DIET Yes COMPLIANCE . OCC MUSCLE CRAMPS / NO MUSCLE ACHES  VIT D DEF - TAKING MED. LABS D/W PT  DEPRESSION- TAKING MED.  INSOMNIA - IMPROVED. DENIES SUICIDAL / NO HOMICIDAL THOUGHTS / IDEATION . PT FOLLOWING ALSO WITH PSYCH  THROMBOCYTOPENIA - CHRONIC.   NO BRUISING. LABS D/W PT  ALLERGIC RHINITIS -  OCC RHINORRHEA, OCC NASAL CONGESTION , NO  POSTNASAL DRAINAGE , NO SINUS PRESSURE, NO HA, OCC SNEEZING, NO WATERY ITCHY EYES. PT C/O CHANGE IN URINE COLOR - ? DURATION.  No DYSURIA, No FREQ, No URGENCY, No HEMATURIA  + FH DM - DIET / EXERCISE REVIEWED      DENIES CP, No SOB, No PALPITATIONS, No COUGH  No ABD PAIN, No N/V, No DIARRHEA, No CONSTIPATION, No MELENA, No HEMATOCHEZIA. PMH: REVIEWED AND UPDATED TODAY    PSH: REVIEWED AND UPDATED TODAY    SOCIAL HX: REVIEWED AND UPDATED TODAY    FAMILY HX: REVIEWED AND UPDATED TODAY    ALLERGY:  Nabumetone    MEDS: REVIEWED  Prior to Visit Medications    Medication Sig Taking?  Authorizing Provider   telmisartan (MICARDIS) 40 MG tablet Take 1 tablet by mouth daily Yes Shawn Guerrier MD   Cholecalciferol (VITAMIN D) 2000 units CAPS capsule Take 1 capsule by mouth daily Yes Shawn Guerrier MD   fexofenadine (ALLEGRA) 180 MG tablet Take 1 tablet by mouth daily as needed (AS NEEDED) Yes Shawn Guerrier MD   pravastatin (PRAVACHOL) 40 MG tablet Take 1 tablet by mouth nightly Yes Shawn Guerrier MD   amLODIPine (NORVASC) 5 MG tablet Take 1 tablet by mouth daily Yes Shawn Guerrier MD   fluticasone (FLONASE) 50 MCG/ACT nasal spray SPRAY TWO SPRAYS IN EACH NOSTRIL ONCE DAILY FOR RHINITIS Yes Gladys Best MD   vitamin B-12 (CYANOCOBALAMIN) 500 MCG tablet Take 1 tablet by mouth daily Yes Gladys Best MD   escitalopram (LEXAPRO) 5 MG tablet Take 5 mg by mouth daily Yes Historical Provider, MD   acetaminophen (TYLENOL) 325 MG tablet Take 2 tablets by mouth every 4 hours as needed for Pain Yes Janiya Cochran MD   aspirin 81 MG tablet Take 81 mg by mouth daily Yes Historical Provider, MD   Multiple Vitamins-Minerals (THERAPEUTIC MULTIVITAMIN-MINERALS) tablet Take 1 tablet by mouth daily Yes Gladys Best MD         ROS: COMPREHENSIVE ROS AS IN HX, REST -VE  History obtained from chart review and the patient    OBJECTIVE:   NURSING NOTE AND VITALS REVIEWED  /70 (Site: Left Upper Arm, Position: Sitting, Cuff Size: Medium Adult)   Pulse 65   Ht 5' 6\" (1.676 m)   Wt 142 lb (64.4 kg)   SpO2 96%   BMI 22.92 kg/m²     NO ACUTE DISTRESS    REPEAT BP: 130/78 (LT), NO ORTHOSTASIS     Body mass index is 22.92 kg/m². HEENT: NO PALLOR, ANICTERIC, PERRLA, EOMI, NO CONJUNCTIVAL ERYTHEMA,                 + NASAL CONGESTION, NO SINUS TENDERNESS  NECK:  SUPPLE, TRACHEA MIDLINE, NT, NO JVD, NO CB, NO LA, NO TM, NO STIFFNESS  CHEST: RESPY EFFORT NL, GOOD AE, NO W/R/C  HEART: S1S2+ REG, NO M/G/R  ABD: SOFT, NT, NO HSM, BS+  EXT: NO EDEMA, NT, PULSES +. TWAN'S -VE  NEURO: ALERT AND ORIENTED X 3, NO MENINGEAL SIGNS, NO TREMORS, AMBULATING WITH A CANE OTHERWISE, NO FOCAL DEFICITS  PSYCH: FAIRLY GOOD AFFECT  BACK: NT, NO ROM, NO CVA TENDERNESS  SHOULDER: + TENDERNESS DELPHINE, + PAIN WITH MVT - DELPHINE, NO ROM  SKIN: NO BRUISING NOTED      PREVIOUS LABS REVIEWED AND D/W PT       ASSESSMENT / PLAN:     Diagnosis Orders   1. Acute pain of both shoulders  COUNSELLED. ? OA. EXERCISES. ANALGESICS PRN. MONITOR. PT PREFER TYLENOL OTC  ADVISED ON HOME EXERCISES  DEFERRED X RAY. MONITOR  MAKE CHANGES AS NEEDED. 2. Essential hypertension  COUNSELLED. CONTINUE MEDS.    ADVISED LOW NA+ / DASH DIET/ EXERCISE. MONITOR. GOAL </= 120/80  F/U LABS  MAKE CHANGES AS NEEDED. 3. Other hyperlipidemia  COUNSELLED. CONTINUE PRAVACHOL 40 MG  ADVISED LOW FAT / CHOL DIET/ EXERCISE.  MONITOR. F/U LABS  GOALS D/W PT.  MAKE CHANGES AS NEEDED. 4. Vitamin D deficiency  COUNSELLED. MONITOR ON VIT D SUPPLEMENT. MAKE CHANGES AS NEEDED. 5. Other depression  COUNSELLED. Amol Scruggs CONTINUE MED PER MH  PT COUNSELLED  ON RELAXATION TECHNIQUES. ADDRESSED STRESS MGT. MONITOR  PT NOT SUICIDAL/ NOT HOMICIDAL  MAKE CHANGES AS NEEDED. 6. Thrombocytopenia ([address]  COUNSELLED. ASYMPTOMATIC. MONITOR  MAKE CHANGES AS NEEDED. 7. Other allergic rhinitis  COUNSELLED. ADVISED ALLEGRA 180 MG DAILY/PRN. FLONASE PRN  MONITOR. MAKE CHANGES AS NEEDED. 8. Abnormal urine color  COUNSELLED. UA TO EVAL AS REQUESTED  ADVISED MAINTAIN HYDRATION. MONITOR  MAKE CHANGES AS NEEDED. 9. Family history of diabetes mellitus (DM)  COUNSELLED. LABS TO EVAL. ADVISED RISK FACTOR MODIFICATION. MAKE CHANGES AS NEEDED. Karla Martinez received counseling on the following healthy behaviors: nutrition, exercise and medication adherence    Patient given educational materials on Hyperlipidemia, Nutrition, Exercise and Hypertension    I have instructed Karla Martinez to complete a self tracking handout on Blood Pressures  and Weights and instructed them to bring it with them to her next appointment. Discussed use, benefit, and side effects of prescribed medications. Barriers to medication compliance addressed. All patient questions answered. Pt voiced understanding.            MEDICATION SIDE EFFECTS D/W PATIENT    PT STABLE AT TIME OF D/C.    RETURN TO CLINIC WITHIN 3 MONTHS / PRN    FOLLOW UP FOR FASTING LABS

## 2019-04-05 LAB
ESTIMATED AVERAGE GLUCOSE: 108.3 MG/DL
HBA1C MFR BLD: 5.4 %

## 2019-06-04 RX ORDER — TELMISARTAN 40 MG/1
40 TABLET ORAL DAILY
Qty: 30 TABLET | Refills: 3 | Status: SHIPPED | OUTPATIENT
Start: 2019-06-04 | End: 2019-07-15 | Stop reason: SDUPTHER

## 2019-06-28 ENCOUNTER — APPOINTMENT (OUTPATIENT)
Dept: GENERAL RADIOLOGY | Age: 84
End: 2019-06-28
Payer: MEDICARE

## 2019-06-28 ENCOUNTER — HOSPITAL ENCOUNTER (EMERGENCY)
Age: 84
Discharge: HOME | End: 2019-06-28
Attending: EMERGENCY MEDICINE
Payer: MEDICARE

## 2019-06-28 VITALS
HEIGHT: 66 IN | DIASTOLIC BLOOD PRESSURE: 72 MMHG | WEIGHT: 145 LBS | OXYGEN SATURATION: 100 % | BODY MASS INDEX: 23.3 KG/M2 | HEART RATE: 61 BPM | SYSTOLIC BLOOD PRESSURE: 146 MMHG | TEMPERATURE: 98.4 F | RESPIRATION RATE: 18 BRPM

## 2019-06-28 DIAGNOSIS — R42 LIGHTHEADEDNESS: Primary | ICD-10-CM

## 2019-06-28 LAB
ANION GAP SERPL CALCULATED.3IONS-SCNC: 11 MMOL/L (ref 3–16)
BACTERIA: ABNORMAL /HPF
BASOPHILS ABSOLUTE: 0 K/UL (ref 0–0.2)
BASOPHILS RELATIVE PERCENT: 0.7 %
BILIRUBIN URINE: NEGATIVE
BLOOD, URINE: NEGATIVE
BUN BLDV-MCNC: 9 MG/DL (ref 7–20)
CALCIUM SERPL-MCNC: 9.3 MG/DL (ref 8.3–10.6)
CASTS: ABNORMAL /LPF
CHLORIDE BLD-SCNC: 106 MMOL/L (ref 99–110)
CLARITY: CLEAR
CO2: 25 MMOL/L (ref 21–32)
COLOR: YELLOW
CREAT SERPL-MCNC: 0.9 MG/DL (ref 0.6–1.2)
EKG ATRIAL RATE: 66 BPM
EKG DIAGNOSIS: NORMAL
EKG P AXIS: 28 DEGREES
EKG P-R INTERVAL: 200 MS
EKG Q-T INTERVAL: 404 MS
EKG QRS DURATION: 100 MS
EKG QTC CALCULATION (BAZETT): 423 MS
EKG R AXIS: -48 DEGREES
EKG T AXIS: 28 DEGREES
EKG VENTRICULAR RATE: 66 BPM
EOSINOPHILS ABSOLUTE: 0 K/UL (ref 0–0.6)
EOSINOPHILS RELATIVE PERCENT: 1 %
EPITHELIAL CELLS, UA: ABNORMAL /HPF
GFR AFRICAN AMERICAN: >60
GFR NON-AFRICAN AMERICAN: 60
GLUCOSE BLD-MCNC: 98 MG/DL (ref 70–99)
GLUCOSE URINE: NEGATIVE MG/DL
HCT VFR BLD CALC: 38.2 % (ref 36–48)
HEMOGLOBIN: 12.1 G/DL (ref 12–16)
KETONES, URINE: NEGATIVE MG/DL
LEUKOCYTE ESTERASE, URINE: ABNORMAL
LYMPHOCYTES ABSOLUTE: 1.1 K/UL (ref 1–5.1)
LYMPHOCYTES RELATIVE PERCENT: 22 %
MCH RBC QN AUTO: 26.1 PG (ref 26–34)
MCHC RBC AUTO-ENTMCNC: 31.7 G/DL (ref 31–36)
MCV RBC AUTO: 82.4 FL (ref 80–100)
MICROSCOPIC EXAMINATION: YES
MONOCYTES ABSOLUTE: 0.4 K/UL (ref 0–1.3)
MONOCYTES RELATIVE PERCENT: 8 %
MUCUS: ABNORMAL /LPF
NEUTROPHILS ABSOLUTE: 3.3 K/UL (ref 1.7–7.7)
NEUTROPHILS RELATIVE PERCENT: 68.3 %
NITRITE, URINE: NEGATIVE
PDW BLD-RTO: 14.2 % (ref 12.4–15.4)
PH UA: 6 (ref 5–8)
PLATELET # BLD: 120 K/UL (ref 135–450)
PMV BLD AUTO: 10.1 FL (ref 5–10.5)
POTASSIUM SERPL-SCNC: 4 MMOL/L (ref 3.5–5.1)
PROTEIN UA: NEGATIVE MG/DL
RBC # BLD: 4.63 M/UL (ref 4–5.2)
RBC UA: ABNORMAL /HPF (ref 0–2)
SODIUM BLD-SCNC: 142 MMOL/L (ref 136–145)
SPECIFIC GRAVITY UA: 1.02 (ref 1–1.03)
TROPONIN: <0.01 NG/ML
URINE TYPE: ABNORMAL
UROBILINOGEN, URINE: 0.2 E.U./DL
WBC # BLD: 4.8 K/UL (ref 4–11)
WBC UA: ABNORMAL /HPF (ref 0–5)

## 2019-06-28 PROCEDURE — 71046 X-RAY EXAM CHEST 2 VIEWS: CPT

## 2019-06-28 PROCEDURE — 85025 COMPLETE CBC W/AUTO DIFF WBC: CPT

## 2019-06-28 PROCEDURE — 81001 URINALYSIS AUTO W/SCOPE: CPT

## 2019-06-28 PROCEDURE — 80048 BASIC METABOLIC PNL TOTAL CA: CPT

## 2019-06-28 PROCEDURE — 93005 ELECTROCARDIOGRAM TRACING: CPT | Performed by: EMERGENCY MEDICINE

## 2019-06-28 PROCEDURE — 99284 EMERGENCY DEPT VISIT MOD MDM: CPT

## 2019-06-28 PROCEDURE — 84484 ASSAY OF TROPONIN QUANT: CPT

## 2019-06-28 ASSESSMENT — ENCOUNTER SYMPTOMS
VOMITING: 0
SHORTNESS OF BREATH: 0
SORE THROAT: 0
BACK PAIN: 0
ABDOMINAL PAIN: 0
NAUSEA: 0
COUGH: 0
EYE PAIN: 0

## 2019-06-28 NOTE — ED NOTES
Patient prepared for and ready to be discharged. Patient discharged at this time in no acute distress after verbalizing understanding of discharge instructions. Patient left after receiving After Visit Summary instructions.       Anam Colmenares RN  06/28/19 [id number]

## 2019-06-28 NOTE — ED PROVIDER NOTES
ED Attending Attestation Note     Date of evaluation: 6/28/2019    This patient was seen by the resident. I have seen and examined the patient, agree with the workup, evaluation, management and diagnosis. The care plan has been discussed. I have reviewed the ECG and concur with the resident's interpretation. My assessment reveals dizziness yesterday but much better today. Currently she says she is basically feeling normal.  She states that she ate too much candy yesterday and felt dizzy shortly thereafter. On my exam GCS 15. No nystagmus. Moves all 4 extremities 5/5. No dysmetria.      Baldo Sweet MD  06/28/19 2530
[facility]).  She has a past surgical history that includes Breast lumpectomy; Hemorrhoid surgery; Hysterectomy; and Cataract removal with implant. Her family history includes Cancer in her maternal grandmother; Diabetes in her paternal aunt; Heart Disease in her brother; High Blood Pressure in her brother, mother, sister, and another family member; Stroke in her paternal uncle. She reports that she has never smoked. She has never used smokeless tobacco. She reports that she does not drink alcohol or use drugs. Medications     Discharge Medication List as of 6/28/2019 12:13 PM      CONTINUE these medications which have NOT CHANGED    Details   telmisartan (MICARDIS) 40 MG tablet Take 1 tablet by mouth daily, Disp-30 tablet, R-3Replaces avaproNormal      Cholecalciferol (VITAMIN D) 2000 units CAPS capsule Take 1 capsule by mouth daily, Disp-90 capsule, R-0Normal      fexofenadine (ALLEGRA) 180 MG tablet Take 1 tablet by mouth daily as needed (AS NEEDED), Disp-30 tablet, R-3Normal      pravastatin (PRAVACHOL) 40 MG tablet Take 1 tablet by mouth nightly, Disp-90 tablet, R-0INCREASED DOSENormal      amLODIPine (NORVASC) 5 MG tablet Take 1 tablet by mouth daily, Disp-90 tablet, R-0PLEASE D/C NORVASC 10 MGNormal      fluticasone (FLONASE) 50 MCG/ACT nasal spray SPRAY TWO SPRAYS IN EACH NOSTRIL ONCE DAILY FOR RHINITIS, Disp-1 Bottle, R-0Normal      vitamin B-12 (CYANOCOBALAMIN) 500 MCG tablet Take 1 tablet by mouth daily, Disp-30 tablet, R-3Normal      escitalopram (LEXAPRO) 5 MG tablet Take 5 mg by mouth dailyHistorical Med      acetaminophen (TYLENOL) 325 MG tablet Take 2 tablets by mouth every 4 hours as needed for Pain, Disp-120 tablet, R-3Normal      aspirin 81 MG tablet Take 81 mg by mouth daily      Multiple Vitamins-Minerals (THERAPEUTIC MULTIVITAMIN-MINERALS) tablet Take 1 tablet by mouth daily, Disp-30 tablet, R-3OTC             Allergies     She is allergic to nabumetone.     Physical Exam     INITIAL VITALS: BP:

## 2019-07-15 ENCOUNTER — OFFICE VISIT (OUTPATIENT)
Dept: INTERNAL MEDICINE CLINIC | Age: 84
End: 2019-07-15
Payer: MEDICARE

## 2019-07-15 VITALS
TEMPERATURE: 98.6 F | HEART RATE: 63 BPM | DIASTOLIC BLOOD PRESSURE: 80 MMHG | SYSTOLIC BLOOD PRESSURE: 130 MMHG | HEIGHT: 66 IN | BODY MASS INDEX: 23.08 KG/M2 | OXYGEN SATURATION: 95 % | WEIGHT: 143.6 LBS

## 2019-07-15 DIAGNOSIS — E78.49 OTHER HYPERLIPIDEMIA: ICD-10-CM

## 2019-07-15 DIAGNOSIS — F32.89 OTHER DEPRESSION: ICD-10-CM

## 2019-07-15 DIAGNOSIS — I10 ESSENTIAL HYPERTENSION: Primary | ICD-10-CM

## 2019-07-15 DIAGNOSIS — R42 DIZZINESS: ICD-10-CM

## 2019-07-15 DIAGNOSIS — J30.89 OTHER ALLERGIC RHINITIS: ICD-10-CM

## 2019-07-15 DIAGNOSIS — D69.6 THROMBOCYTOPENIA (HCC): ICD-10-CM

## 2019-07-15 DIAGNOSIS — M25.512 ACUTE PAIN OF BOTH SHOULDERS: ICD-10-CM

## 2019-07-15 DIAGNOSIS — E55.9 VITAMIN D DEFICIENCY: ICD-10-CM

## 2019-07-15 DIAGNOSIS — M25.511 ACUTE PAIN OF BOTH SHOULDERS: ICD-10-CM

## 2019-07-15 DIAGNOSIS — I10 ESSENTIAL HYPERTENSION: ICD-10-CM

## 2019-07-15 LAB
A/G RATIO: 2 (ref 1.1–2.2)
ALBUMIN SERPL-MCNC: 4.4 G/DL (ref 3.4–5)
ALP BLD-CCNC: 64 U/L (ref 40–129)
ALT SERPL-CCNC: 9 U/L (ref 10–40)
ANION GAP SERPL CALCULATED.3IONS-SCNC: 11 MMOL/L (ref 3–16)
AST SERPL-CCNC: 19 U/L (ref 15–37)
BILIRUB SERPL-MCNC: 1 MG/DL (ref 0–1)
BUN BLDV-MCNC: 10 MG/DL (ref 7–20)
CALCIUM SERPL-MCNC: 9.5 MG/DL (ref 8.3–10.6)
CHLORIDE BLD-SCNC: 106 MMOL/L (ref 99–110)
CHOLESTEROL, TOTAL: 251 MG/DL (ref 0–199)
CO2: 27 MMOL/L (ref 21–32)
CREAT SERPL-MCNC: 0.9 MG/DL (ref 0.6–1.2)
GFR AFRICAN AMERICAN: >60
GFR NON-AFRICAN AMERICAN: 60
GLOBULIN: 2.2 G/DL
GLUCOSE BLD-MCNC: 99 MG/DL (ref 70–99)
HDLC SERPL-MCNC: 59 MG/DL (ref 40–60)
LDL CHOLESTEROL CALCULATED: 175 MG/DL
POTASSIUM SERPL-SCNC: 3.9 MMOL/L (ref 3.5–5.1)
SODIUM BLD-SCNC: 144 MMOL/L (ref 136–145)
TOTAL PROTEIN: 6.6 G/DL (ref 6.4–8.2)
TRIGL SERPL-MCNC: 86 MG/DL (ref 0–150)
VITAMIN D 25-HYDROXY: 19.9 NG/ML
VLDLC SERPL CALC-MCNC: 17 MG/DL

## 2019-07-15 PROCEDURE — 1123F ACP DISCUSS/DSCN MKR DOCD: CPT | Performed by: INTERNAL MEDICINE

## 2019-07-15 PROCEDURE — G8427 DOCREV CUR MEDS BY ELIG CLIN: HCPCS | Performed by: INTERNAL MEDICINE

## 2019-07-15 PROCEDURE — 1036F TOBACCO NON-USER: CPT | Performed by: INTERNAL MEDICINE

## 2019-07-15 PROCEDURE — G8420 CALC BMI NORM PARAMETERS: HCPCS | Performed by: INTERNAL MEDICINE

## 2019-07-15 PROCEDURE — 4040F PNEUMOC VAC/ADMIN/RCVD: CPT | Performed by: INTERNAL MEDICINE

## 2019-07-15 PROCEDURE — G8400 PT W/DXA NO RESULTS DOC: HCPCS | Performed by: INTERNAL MEDICINE

## 2019-07-15 PROCEDURE — 1090F PRES/ABSN URINE INCON ASSESS: CPT | Performed by: INTERNAL MEDICINE

## 2019-07-15 PROCEDURE — 99214 OFFICE O/P EST MOD 30 MIN: CPT | Performed by: INTERNAL MEDICINE

## 2019-07-15 RX ORDER — MECLIZINE HYDROCHLORIDE 25 MG/1
25 TABLET ORAL 3 TIMES DAILY PRN
Qty: 30 TABLET | Refills: 0 | Status: SHIPPED | OUTPATIENT
Start: 2019-07-15 | End: 2019-07-25

## 2019-07-15 RX ORDER — PRAVASTATIN SODIUM 40 MG
40 TABLET ORAL NIGHTLY
Qty: 90 TABLET | Refills: 0 | Status: SHIPPED | OUTPATIENT
Start: 2019-07-15 | End: 2019-10-15 | Stop reason: DRUGHIGH

## 2019-07-15 RX ORDER — AMLODIPINE BESYLATE 5 MG/1
5 TABLET ORAL DAILY
Qty: 90 TABLET | Refills: 0 | Status: SHIPPED | OUTPATIENT
Start: 2019-07-15 | End: 2019-10-15 | Stop reason: SDUPTHER

## 2019-07-15 RX ORDER — TELMISARTAN 40 MG/1
40 TABLET ORAL DAILY
Qty: 90 TABLET | Refills: 0 | Status: SHIPPED | OUTPATIENT
Start: 2019-07-15 | End: 2019-10-15 | Stop reason: SDUPTHER

## 2019-10-04 DIAGNOSIS — J30.89 OTHER ALLERGIC RHINITIS: ICD-10-CM

## 2019-10-04 RX ORDER — FLUTICASONE PROPIONATE 50 MCG
SPRAY, SUSPENSION (ML) NASAL
Qty: 1 BOTTLE | Refills: 0 | Status: SHIPPED | OUTPATIENT
Start: 2019-10-04 | End: 2019-11-13 | Stop reason: SDUPTHER

## 2019-10-15 ENCOUNTER — OFFICE VISIT (OUTPATIENT)
Dept: INTERNAL MEDICINE CLINIC | Age: 84
End: 2019-10-15
Payer: MEDICARE

## 2019-10-15 ENCOUNTER — CARE COORDINATION (OUTPATIENT)
Dept: CARE COORDINATION | Age: 84
End: 2019-10-15

## 2019-10-15 VITALS
OXYGEN SATURATION: 92 % | SYSTOLIC BLOOD PRESSURE: 130 MMHG | WEIGHT: 148 LBS | BODY MASS INDEX: 23.78 KG/M2 | HEIGHT: 66 IN | DIASTOLIC BLOOD PRESSURE: 88 MMHG | TEMPERATURE: 98.1 F | HEART RATE: 61 BPM

## 2019-10-15 DIAGNOSIS — Z23 NEED FOR IMMUNIZATION AGAINST INFLUENZA: ICD-10-CM

## 2019-10-15 DIAGNOSIS — E78.2 MIXED HYPERLIPIDEMIA: Primary | ICD-10-CM

## 2019-10-15 DIAGNOSIS — E55.9 VITAMIN D DEFICIENCY: ICD-10-CM

## 2019-10-15 DIAGNOSIS — I10 ESSENTIAL HYPERTENSION: ICD-10-CM

## 2019-10-15 DIAGNOSIS — D69.6 THROMBOCYTOPENIA (HCC): ICD-10-CM

## 2019-10-15 DIAGNOSIS — M25.511 ACUTE PAIN OF BOTH SHOULDERS: ICD-10-CM

## 2019-10-15 DIAGNOSIS — M25.512 ACUTE PAIN OF BOTH SHOULDERS: ICD-10-CM

## 2019-10-15 DIAGNOSIS — J30.89 OTHER ALLERGIC RHINITIS: ICD-10-CM

## 2019-10-15 DIAGNOSIS — F32.89 OTHER DEPRESSION: ICD-10-CM

## 2019-10-15 PROCEDURE — G0008 ADMIN INFLUENZA VIRUS VAC: HCPCS | Performed by: INTERNAL MEDICINE

## 2019-10-15 PROCEDURE — 90653 IIV ADJUVANT VACCINE IM: CPT | Performed by: INTERNAL MEDICINE

## 2019-10-15 PROCEDURE — 1090F PRES/ABSN URINE INCON ASSESS: CPT | Performed by: INTERNAL MEDICINE

## 2019-10-15 PROCEDURE — 1123F ACP DISCUSS/DSCN MKR DOCD: CPT | Performed by: INTERNAL MEDICINE

## 2019-10-15 PROCEDURE — 1036F TOBACCO NON-USER: CPT | Performed by: INTERNAL MEDICINE

## 2019-10-15 PROCEDURE — G8420 CALC BMI NORM PARAMETERS: HCPCS | Performed by: INTERNAL MEDICINE

## 2019-10-15 PROCEDURE — G8400 PT W/DXA NO RESULTS DOC: HCPCS | Performed by: INTERNAL MEDICINE

## 2019-10-15 PROCEDURE — 4040F PNEUMOC VAC/ADMIN/RCVD: CPT | Performed by: INTERNAL MEDICINE

## 2019-10-15 PROCEDURE — G8482 FLU IMMUNIZE ORDER/ADMIN: HCPCS | Performed by: INTERNAL MEDICINE

## 2019-10-15 PROCEDURE — 99214 OFFICE O/P EST MOD 30 MIN: CPT | Performed by: INTERNAL MEDICINE

## 2019-10-15 PROCEDURE — G8427 DOCREV CUR MEDS BY ELIG CLIN: HCPCS | Performed by: INTERNAL MEDICINE

## 2019-10-15 RX ORDER — PRAVASTATIN SODIUM 80 MG/1
80 TABLET ORAL NIGHTLY
Qty: 90 TABLET | Refills: 0 | Status: SHIPPED | OUTPATIENT
Start: 2019-10-15 | End: 2020-01-16 | Stop reason: SDUPTHER

## 2019-10-15 RX ORDER — TELMISARTAN 40 MG/1
40 TABLET ORAL DAILY
Qty: 90 TABLET | Refills: 0 | Status: SHIPPED | OUTPATIENT
Start: 2019-10-15 | End: 2020-01-16 | Stop reason: SDUPTHER

## 2019-10-15 RX ORDER — MULTIVIT-MIN/IRON/FOLIC ACID/K 18-600-40
1 CAPSULE ORAL DAILY
Qty: 90 CAPSULE | Refills: 0 | Status: SHIPPED | OUTPATIENT
Start: 2019-10-15 | End: 2020-01-16 | Stop reason: SDUPTHER

## 2019-10-15 RX ORDER — AMLODIPINE BESYLATE 5 MG/1
5 TABLET ORAL DAILY
Qty: 90 TABLET | Refills: 0 | Status: SHIPPED | OUTPATIENT
Start: 2019-10-15 | End: 2019-12-31

## 2019-11-13 DIAGNOSIS — J30.89 OTHER ALLERGIC RHINITIS: ICD-10-CM

## 2019-11-13 RX ORDER — FLUTICASONE PROPIONATE 50 MCG
SPRAY, SUSPENSION (ML) NASAL
Qty: 1 BOTTLE | Refills: 0 | Status: SHIPPED | OUTPATIENT
Start: 2019-11-13 | End: 2020-01-16 | Stop reason: SDUPTHER

## 2019-12-31 DIAGNOSIS — I10 ESSENTIAL HYPERTENSION: ICD-10-CM

## 2019-12-31 RX ORDER — AMLODIPINE BESYLATE 5 MG/1
TABLET ORAL
Qty: 90 TABLET | Refills: 0 | Status: SHIPPED | OUTPATIENT
Start: 2019-12-31 | End: 2020-01-16 | Stop reason: SDUPTHER

## 2020-01-16 ENCOUNTER — OFFICE VISIT (OUTPATIENT)
Dept: INTERNAL MEDICINE CLINIC | Age: 85
End: 2020-01-16
Payer: MEDICARE

## 2020-01-16 VITALS
OXYGEN SATURATION: 95 % | DIASTOLIC BLOOD PRESSURE: 78 MMHG | HEART RATE: 70 BPM | BODY MASS INDEX: 23.4 KG/M2 | WEIGHT: 145.6 LBS | SYSTOLIC BLOOD PRESSURE: 130 MMHG | TEMPERATURE: 98.2 F | HEIGHT: 66 IN

## 2020-01-16 DIAGNOSIS — E55.9 VITAMIN D DEFICIENCY: ICD-10-CM

## 2020-01-16 DIAGNOSIS — E78.2 MIXED HYPERLIPIDEMIA: ICD-10-CM

## 2020-01-16 DIAGNOSIS — I10 ESSENTIAL HYPERTENSION: ICD-10-CM

## 2020-01-16 DIAGNOSIS — D69.6 THROMBOCYTOPENIA (HCC): ICD-10-CM

## 2020-01-16 DIAGNOSIS — Z00.00 ROUTINE GENERAL MEDICAL EXAMINATION AT A HEALTH CARE FACILITY: ICD-10-CM

## 2020-01-16 PROBLEM — F33.1 MODERATE EPISODE OF RECURRENT MAJOR DEPRESSIVE DISORDER (HCC): Status: ACTIVE | Noted: 2020-01-16

## 2020-01-16 LAB
A/G RATIO: 1.6 (ref 1.1–2.2)
ALBUMIN SERPL-MCNC: 4.1 G/DL (ref 3.4–5)
ALP BLD-CCNC: 71 U/L (ref 40–129)
ALT SERPL-CCNC: 10 U/L (ref 10–40)
ANION GAP SERPL CALCULATED.3IONS-SCNC: 13 MMOL/L (ref 3–16)
AST SERPL-CCNC: 17 U/L (ref 15–37)
BASOPHILS ABSOLUTE: 0 K/UL (ref 0–0.2)
BASOPHILS RELATIVE PERCENT: 0.6 %
BILIRUB SERPL-MCNC: 0.9 MG/DL (ref 0–1)
BUN BLDV-MCNC: 5 MG/DL (ref 7–20)
CALCIUM SERPL-MCNC: 9.3 MG/DL (ref 8.3–10.6)
CHLORIDE BLD-SCNC: 103 MMOL/L (ref 99–110)
CHOLESTEROL, TOTAL: 195 MG/DL (ref 0–199)
CO2: 28 MMOL/L (ref 21–32)
CREAT SERPL-MCNC: 0.8 MG/DL (ref 0.6–1.2)
EOSINOPHILS ABSOLUTE: 0 K/UL (ref 0–0.6)
EOSINOPHILS RELATIVE PERCENT: 0.7 %
GFR AFRICAN AMERICAN: >60
GFR NON-AFRICAN AMERICAN: >60
GLOBULIN: 2.5 G/DL
GLUCOSE BLD-MCNC: 97 MG/DL (ref 70–99)
HCT VFR BLD CALC: 38.5 % (ref 36–48)
HDLC SERPL-MCNC: 46 MG/DL (ref 40–60)
HEMOGLOBIN: 12.2 G/DL (ref 12–16)
LDL CHOLESTEROL CALCULATED: 128 MG/DL
LYMPHOCYTES ABSOLUTE: 1 K/UL (ref 1–5.1)
LYMPHOCYTES RELATIVE PERCENT: 19.6 %
MCH RBC QN AUTO: 26.7 PG (ref 26–34)
MCHC RBC AUTO-ENTMCNC: 31.8 G/DL (ref 31–36)
MCV RBC AUTO: 83.9 FL (ref 80–100)
MONOCYTES ABSOLUTE: 0.4 K/UL (ref 0–1.3)
MONOCYTES RELATIVE PERCENT: 7.5 %
NEUTROPHILS ABSOLUTE: 3.7 K/UL (ref 1.7–7.7)
NEUTROPHILS RELATIVE PERCENT: 71.6 %
PDW BLD-RTO: 15.1 % (ref 12.4–15.4)
PLATELET # BLD: 131 K/UL (ref 135–450)
PMV BLD AUTO: 10.9 FL (ref 5–10.5)
POTASSIUM SERPL-SCNC: 3.6 MMOL/L (ref 3.5–5.1)
RBC # BLD: 4.59 M/UL (ref 4–5.2)
SODIUM BLD-SCNC: 144 MMOL/L (ref 136–145)
TOTAL PROTEIN: 6.6 G/DL (ref 6.4–8.2)
TRIGL SERPL-MCNC: 107 MG/DL (ref 0–150)
TSH REFLEX: 1.39 UIU/ML (ref 0.27–4.2)
VITAMIN D 25-HYDROXY: 20.5 NG/ML
VLDLC SERPL CALC-MCNC: 21 MG/DL
WBC # BLD: 5.1 K/UL (ref 4–11)

## 2020-01-16 PROCEDURE — 1123F ACP DISCUSS/DSCN MKR DOCD: CPT | Performed by: INTERNAL MEDICINE

## 2020-01-16 PROCEDURE — 4040F PNEUMOC VAC/ADMIN/RCVD: CPT | Performed by: INTERNAL MEDICINE

## 2020-01-16 PROCEDURE — G8482 FLU IMMUNIZE ORDER/ADMIN: HCPCS | Performed by: INTERNAL MEDICINE

## 2020-01-16 PROCEDURE — G0438 PPPS, INITIAL VISIT: HCPCS | Performed by: INTERNAL MEDICINE

## 2020-01-16 RX ORDER — FLUTICASONE PROPIONATE 50 MCG
2 SPRAY, SUSPENSION (ML) NASAL DAILY PRN
Qty: 1 BOTTLE | Refills: 0 | Status: SHIPPED | OUTPATIENT
Start: 2020-01-16 | End: 2020-03-23

## 2020-01-16 RX ORDER — PRAVASTATIN SODIUM 80 MG/1
80 TABLET ORAL NIGHTLY
Qty: 90 TABLET | Refills: 0 | Status: SHIPPED | OUTPATIENT
Start: 2020-01-16 | End: 2020-04-13

## 2020-01-16 RX ORDER — FEXOFENADINE HCL 180 MG/1
180 TABLET ORAL DAILY PRN
Qty: 30 TABLET | Refills: 3 | Status: SHIPPED | OUTPATIENT
Start: 2020-01-16 | End: 2022-04-27 | Stop reason: CLARIF

## 2020-01-16 RX ORDER — MULTIVIT-MIN/IRON/FOLIC ACID/K 18-600-40
1 CAPSULE ORAL DAILY
Qty: 90 CAPSULE | Refills: 0 | Status: SHIPPED | OUTPATIENT
Start: 2020-01-16 | End: 2020-09-16 | Stop reason: SDUPTHER

## 2020-01-16 RX ORDER — TELMISARTAN 40 MG/1
40 TABLET ORAL DAILY
Qty: 90 TABLET | Refills: 0 | Status: SHIPPED | OUTPATIENT
Start: 2020-01-16 | End: 2020-05-07

## 2020-01-16 RX ORDER — AMLODIPINE BESYLATE 5 MG/1
TABLET ORAL
Qty: 90 TABLET | Refills: 0 | Status: SHIPPED | OUTPATIENT
Start: 2020-01-16 | End: 2020-08-17

## 2020-01-16 ASSESSMENT — PATIENT HEALTH QUESTIONNAIRE - PHQ9
6. FEELING BAD ABOUT YOURSELF - OR THAT YOU ARE A FAILURE OR HAVE LET YOURSELF OR YOUR FAMILY DOWN: 1
2. FEELING DOWN, DEPRESSED OR HOPELESS: 3
9. THOUGHTS THAT YOU WOULD BE BETTER OFF DEAD, OR OF HURTING YOURSELF: 1
7. TROUBLE CONCENTRATING ON THINGS, SUCH AS READING THE NEWSPAPER OR WATCHING TELEVISION: 1
10. IF YOU CHECKED OFF ANY PROBLEMS, HOW DIFFICULT HAVE THESE PROBLEMS MADE IT FOR YOU TO DO YOUR WORK, TAKE CARE OF THINGS AT HOME, OR GET ALONG WITH OTHER PEOPLE: 1
4. FEELING TIRED OR HAVING LITTLE ENERGY: 1
SUM OF ALL RESPONSES TO PHQ QUESTIONS 1-9: 11
5. POOR APPETITE OR OVEREATING: 1
SUM OF ALL RESPONSES TO PHQ QUESTIONS 1-9: 11
3. TROUBLE FALLING OR STAYING ASLEEP: 1
1. LITTLE INTEREST OR PLEASURE IN DOING THINGS: 1
8. MOVING OR SPEAKING SO SLOWLY THAT OTHER PEOPLE COULD HAVE NOTICED. OR THE OPPOSITE, BEING SO FIGETY OR RESTLESS THAT YOU HAVE BEEN MOVING AROUND A LOT MORE THAN USUAL: 1
SUM OF ALL RESPONSES TO PHQ9 QUESTIONS 1 & 2: 4

## 2020-01-16 ASSESSMENT — LIFESTYLE VARIABLES: HOW OFTEN DO YOU HAVE A DRINK CONTAINING ALCOHOL: 0

## 2020-01-16 ASSESSMENT — COLUMBIA-SUICIDE SEVERITY RATING SCALE - C-SSRS
2. HAVE YOU ACTUALLY HAD ANY THOUGHTS OF KILLING YOURSELF?: NO
1. WITHIN THE PAST MONTH, HAVE YOU WISHED YOU WERE DEAD OR WISHED YOU COULD GO TO SLEEP AND NOT WAKE UP?: NO
6. HAVE YOU EVER DONE ANYTHING, STARTED TO DO ANYTHING, OR PREPARED TO DO ANYTHING TO END YOUR LIFE?: NO

## 2020-01-16 NOTE — PROGRESS NOTES
telmisartan (MICARDIS) 40 MG tablet Take 1 tablet by mouth daily Yes Ema Dodge MD   Cholecalciferol (VITAMIN D) 2000 units CAPS capsule Take 1 capsule by mouth daily Yes Ema Dodge MD   fexofenadine (ALLEGRA) 180 MG tablet Take 1 tablet by mouth daily as needed (AS NEEDED) Yes Ema Dodge MD   escitalopram (LEXAPRO) 5 MG tablet Take 5 mg by mouth daily Yes Historical Provider, MD   acetaminophen (TYLENOL) 325 MG tablet Take 2 tablets by mouth every 4 hours as needed for Pain Yes Paige Ohara MD   aspirin 81 MG tablet Take 81 mg by mouth daily Yes Historical Provider, MD   Multiple Vitamins-Minerals (THERAPEUTIC MULTIVITAMIN-MINERALS) tablet Take 1 tablet by mouth daily Yes Ema Dodge MD   vitamin B-12 (CYANOCOBALAMIN) 500 MCG tablet Take 1 tablet by mouth daily  Ema Dodge MD         Past Medical History:   Diagnosis Date    Allergic rhinitis     Depression     DJD (degenerative joint disease)     Hyperlipidemia     Hypertension     Thrombocytopenia ([address]        Past Surgical History:   Procedure Laterality Date    BREAST LUMPECTOMY      RT - BENIGN    CATARACT REMOVAL WITH IMPLANT      no implant per pt     HEMORRHOID SURGERY      HYSTERECTOMY           Family History   Problem Relation Age of Onset    High Blood Pressure Mother     High Blood Pressure Sister     Heart Disease Brother     High Blood Pressure Brother     Stroke Paternal Uncle     Cancer Maternal Grandmother         ?  FEMALE ORGAN    High Blood Pressure Other         SON    Diabetes Paternal Aunt        CareTeam (Including outside providers/suppliers regularly involved in providing care):   Patient Care Team:  Ema Dodge MD as PCP - General (Internal Medicine)  Ema Dodge MD as P[facility] Empaneled Provider    Wt Readings from Last 3 Encounters:   01/16/20 145 lb 9.6 oz (66 kg)   10/15/19 148 lb (67.1 kg)   07/15/19 143 lb 9.6 oz (65.1 kg)     Vitals:    01/16/20 1023 01/16/20 1050   BP: (!) 150/80 (!) 150/80   Site: Left Upper Arm    Position: Sitting    Cuff Size: Medium Adult    Pulse: 70    Temp: 98.2 °F (36.8 °C)    SpO2: 95%    Weight: 145 lb 9.6 oz (66 kg)    Height: 5' 6\" (1.676 m)        Based upon direct observation of the patient, evaluation of cognition reveals recent and remote memory intact. Patient's complete Health Risk Assessment and screening values have been reviewed and are found in Flowsheets. The following problems were reviewed today and where indicated follow up appointments were made and/or referrals ordered. Positive Risk Factor Screenings with Interventions:     General Health:  General  In general, how would you say your health is?: (!) Poor  In the past 7 days, have you experienced any of the following? New or Increased Pain, New or Increased Fatigue, Loneliness, Social Isolation, Stress or Anger?: (!) New or Increased Fatigue  Do you get the social and emotional support that you need?: Yes  Do you have a Living Will?: (!) No  General Health Risk Interventions:  · Poor self-assessment of health status: patient advised to follow-up in this office for further evaluation  within 3 month(s)  · Fatigue: regular exercise recommended- 3-5 times per week, 30-45 minutes per session  · No Living Will: referred to  Aiotra Habits/Nutrition:  Health Habits/Nutrition  Do you exercise for at least 20 minutes 2-3 times per week?: Yes  Have you lost any weight without trying in the past 3 months?: No  Do you eat fewer than 2 meals per day?: No  Have you seen a dentist within the past year?: (!) No  Body mass index is 23.5 kg/m².   Health Habits/Nutrition Interventions:  · Dental exam overdue:  patient encouraged to make appointment with his/her dentist    Personalized Preventive Plan   Current Health Maintenance Status  Immunization History   Administered Date(s) Administered    Influenza 11/27/2012    Influenza, High Dose (Fluzone 65 yrs and older) 12/04/2013, 10/29/2014, 09/29/2016, 11/28/2017, 12/03/2018    Influenza, Triv, inactivated, subunit, adjuvanted, IM (Fluad 65 yrs and older) 10/15/2019    Pneumococcal Conjugate 13-valent ([id number]) 06/12/2018    Pneumococcal Polysaccharide ([id number]) 03/20/2012        Health Maintenance   Topic Date Due    Shingles Vaccine (1 of 2) 08/21/1986    Annual Wellness Visit (AWV)  05/29/2019    Lipid screen  07/15/2020    Potassium monitoring  07/15/2020    Creatinine monitoring  07/15/2020    DTaP/Tdap/Td vaccine (2 - Td) 07/10/2024    Flu vaccine  Completed    Pneumococcal 65+ years Vaccine  Completed    DEXA (modify frequency per FRAX score)  Addressed       GENERAL PHYSICAL EXAM:  SEE BELOW    OBJECTIVE:   NURSING NOTE AND VITALS REVIEWED  BP (!) 150/80   Pulse 70   Temp 98.2 °F (36.8 °C)   Ht 5' 6\" (1.676 m)   Wt 145 lb 9.6 oz (66 kg)   SpO2 95%   Breastfeeding? No   BMI 23.50 kg/m²     NO ACUTE DISTRESS    REPEAT BP: 130/78 (LT), NO ORTHOSTASIS     Body mass index is 23.5 kg/m². HEENT: NO PALLOR, ANICTERIC, PERRLA, EOMI, NO CONJUNCTIVAL ERYTHEMA,                 NO SINUS TENDERNESS, MINIMAL CERUMEN - NOT IMPACTED, NO TM, NO PHARYNGEAL ERYTHEMA  NECK:  SUPPLE, TRACHEA MIDLINE, NT, NO JVD, NO CB, NO LA, NO TM, NO STIFFNESS  CHEST: RESPY EFFORT NL, GOOD AE, NO W/R/C  HEART: S1S2+ REG, NO M/G/R  ABD: SOFT, NT, NO HSM, BS+  EXT: NO EDEMA, NT, PULSES +. TWAN'S -VE  NEURO: ALERT AND ORIENTED X 3, NO MENINGEAL SIGNS, NO TREMORS, AMBULATING WITH A CANE OTHERWISE,  NO FOCAL DEFICITS  PSYCH: OCC DEPRESSED AFFECT. RECALL 3/3  BACK: NT, NO ROM, NO CVA TENDERNESS    PREVIOUS LABS REVIEWED AND D/W PT     Vianney Simmons was seen today for medicare awv, hypertension and hyperlipidemia. Diagnoses and all orders for this visit:    ASSESSMENT / PLAN:     Diagnosis Orders   1. Routine general medical examination at a health care facility  COUNSELLED.  HEALTH / SAFETY EDUCATION instructions/AVS.  .   Recommended screening schedule for the next 5-10 years is provided to the patient in written form: see Patient Instructions/AVS.

## 2020-03-23 RX ORDER — FLUTICASONE PROPIONATE 50 MCG
SPRAY, SUSPENSION (ML) NASAL
Qty: 1 BOTTLE | Refills: 0 | Status: SHIPPED | OUTPATIENT
Start: 2020-03-23 | End: 2021-01-07

## 2020-04-13 RX ORDER — PRAVASTATIN SODIUM 80 MG/1
TABLET ORAL
Qty: 90 TABLET | Refills: 0 | Status: SHIPPED | OUTPATIENT
Start: 2020-04-13 | End: 2020-09-16 | Stop reason: SDUPTHER

## 2020-04-21 ENCOUNTER — TELEPHONE (OUTPATIENT)
Dept: INTERNAL MEDICINE CLINIC | Age: 85
End: 2020-04-21

## 2020-05-07 RX ORDER — TELMISARTAN 40 MG/1
TABLET ORAL
Qty: 30 TABLET | Refills: 0 | Status: SHIPPED | OUTPATIENT
Start: 2020-05-07 | End: 2020-06-17

## 2020-06-17 RX ORDER — TELMISARTAN 40 MG/1
TABLET ORAL
Qty: 30 TABLET | Refills: 0 | Status: SHIPPED | OUTPATIENT
Start: 2020-06-17 | End: 2020-07-21

## 2020-07-02 ENCOUNTER — TELEPHONE (OUTPATIENT)
Dept: INTERNAL MEDICINE CLINIC | Age: 85
End: 2020-07-02

## 2020-07-09 ENCOUNTER — TELEPHONE (OUTPATIENT)
Dept: INTERNAL MEDICINE CLINIC | Age: 85
End: 2020-07-09

## 2020-07-21 RX ORDER — TELMISARTAN 40 MG/1
TABLET ORAL
Qty: 30 TABLET | Refills: 0 | Status: SHIPPED | OUTPATIENT
Start: 2020-07-21 | End: 2020-08-31

## 2020-09-03 ENCOUNTER — TELEPHONE (OUTPATIENT)
Dept: INTERNAL MEDICINE CLINIC | Age: 85
End: 2020-09-03

## 2020-09-03 RX ORDER — TELMISARTAN 40 MG/1
TABLET ORAL
Qty: 30 TABLET | Refills: 0 | Status: SHIPPED | OUTPATIENT
Start: 2020-09-03 | End: 2020-09-16 | Stop reason: SDUPTHER

## 2020-09-03 NOTE — TELEPHONE ENCOUNTER
Pt called to get refill on both bp med's pt has 1 pill left of the telemasartan and pt has an appt on 9-16-20

## 2020-09-16 ENCOUNTER — OFFICE VISIT (OUTPATIENT)
Dept: INTERNAL MEDICINE CLINIC | Age: 85
End: 2020-09-16
Payer: MEDICARE

## 2020-09-16 VITALS
SYSTOLIC BLOOD PRESSURE: 130 MMHG | OXYGEN SATURATION: 97 % | DIASTOLIC BLOOD PRESSURE: 82 MMHG | HEIGHT: 66 IN | TEMPERATURE: 97 F | WEIGHT: 140 LBS | BODY MASS INDEX: 22.5 KG/M2 | HEART RATE: 66 BPM

## 2020-09-16 PROCEDURE — G8427 DOCREV CUR MEDS BY ELIG CLIN: HCPCS | Performed by: INTERNAL MEDICINE

## 2020-09-16 PROCEDURE — 99214 OFFICE O/P EST MOD 30 MIN: CPT | Performed by: INTERNAL MEDICINE

## 2020-09-16 PROCEDURE — 1036F TOBACCO NON-USER: CPT | Performed by: INTERNAL MEDICINE

## 2020-09-16 PROCEDURE — 90653 IIV ADJUVANT VACCINE IM: CPT | Performed by: INTERNAL MEDICINE

## 2020-09-16 PROCEDURE — 1090F PRES/ABSN URINE INCON ASSESS: CPT | Performed by: INTERNAL MEDICINE

## 2020-09-16 PROCEDURE — G0008 ADMIN INFLUENZA VIRUS VAC: HCPCS | Performed by: INTERNAL MEDICINE

## 2020-09-16 PROCEDURE — 81002 URINALYSIS NONAUTO W/O SCOPE: CPT | Performed by: INTERNAL MEDICINE

## 2020-09-16 PROCEDURE — G8400 PT W/DXA NO RESULTS DOC: HCPCS | Performed by: INTERNAL MEDICINE

## 2020-09-16 PROCEDURE — G8420 CALC BMI NORM PARAMETERS: HCPCS | Performed by: INTERNAL MEDICINE

## 2020-09-16 PROCEDURE — 1123F ACP DISCUSS/DSCN MKR DOCD: CPT | Performed by: INTERNAL MEDICINE

## 2020-09-16 PROCEDURE — 4040F PNEUMOC VAC/ADMIN/RCVD: CPT | Performed by: INTERNAL MEDICINE

## 2020-09-16 RX ORDER — MULTIVIT-MIN/IRON/FOLIC ACID/K 18-600-40
1 CAPSULE ORAL DAILY
Qty: 90 CAPSULE | Refills: 0 | Status: SHIPPED | OUTPATIENT
Start: 2020-09-16 | End: 2021-03-19 | Stop reason: SDUPTHER

## 2020-09-16 RX ORDER — TELMISARTAN 40 MG/1
TABLET ORAL
Qty: 90 TABLET | Refills: 0 | Status: SHIPPED | OUTPATIENT
Start: 2020-09-16 | End: 2021-01-19

## 2020-09-16 RX ORDER — AMLODIPINE BESYLATE 5 MG/1
5 TABLET ORAL DAILY
Qty: 90 TABLET | Refills: 0 | Status: SHIPPED | OUTPATIENT
Start: 2020-09-16 | End: 2021-01-21 | Stop reason: SDUPTHER

## 2020-09-16 RX ORDER — LIDOCAINE 50 MG/G
OINTMENT TOPICAL
Qty: 1 TUBE | Refills: 0 | Status: SHIPPED | OUTPATIENT
Start: 2020-09-16 | End: 2021-09-29 | Stop reason: SDUPTHER

## 2020-09-16 RX ORDER — PRAVASTATIN SODIUM 80 MG/1
TABLET ORAL
Qty: 90 TABLET | Refills: 0 | Status: SHIPPED | OUTPATIENT
Start: 2020-09-16 | End: 2021-02-28

## 2020-09-16 NOTE — PROGRESS NOTES
SUBJECTIVE:  Stephanie Ayala is a 81 y.o. female [address] [place]   Chief Complaint   Patient presents with    Hypertension    Hyperlipidemia    Other      PT HERE FOR EVAL    HTN - TAKING MEDS. ? DIET / EXERCISE COMPLIANCE. Irven Backbone. HLP - TAKING  MED . + EXERCISE / DIET  COMPLIANCE .  ? MUSCLE CRAMPS / Sachin Barnacle. C/O LOW BACK PAIN -  DELPHINE. ? DURATION. INTERMITTENT. ? XTER, PAIN SCALE 6-7/10. ? RAD, ? NUMBNESS / TINGLING. DENIES TRAUMA  VIT D DEF - TAKING MED. NOT AT GOAL - LABS D/W PT  DEPRESSION- TAKING MED.  NO INSOMNIA . DENIES SUICIDAL / NO HOMICIDAL THOUGHTS Susanna Gum  WITH PSYCH  THROMBOCYTOPENIA - CHRONIC.   [address] ALLERGIC RHINITIS -  NO RHINORRHEA, ? NASAL CONGESTION , NO  POSTNASAL DRAINAGE , NO SINUS PRESSURE, NO HA, OCC SNEEZING, OCC WATERY ITCHY EYES. NEEDS FLU VACCINE     DENIES CP, No SOB, No PALPITATIONS, No COUGH, NO F/C  No ABD PAIN, No N/V, No DIARRHEA, No CONSTIPATION, No MELENA, No HEMATOCHEZIA. No DYSURIA, No FREQ, No URGENCY, No HEMATURIA    PMH: REVIEWED AND UPDATED TODAY    PSH: REVIEWED AND UPDATED TODAY    SOCIAL HX: REVIEWED AND UPDATED TODAY    FAMILY HX: REVIEWED AND UPDATED TODAY    ALLERGY:  Nabumetone    MEDS: REVIEWED  Prior to Visit Medications    Medication Sig Taking?  Authorizing Provider   telmisartan (MICARDIS) 40 MG tablet TAKE ONE TABLET BY MOUTH DAILY  Tammie Cabrera MD   amLODIPine (NORVASC) 5 MG tablet TAKE ONE TABLET BY MOUTH DAILY  Tammie Cabrera MD   pravastatin (PRAVACHOL) 80 MG tablet TAKE ONE TABLET BY MOUTH ONCE NIGHTLY  Tammie Cabrera MD   fluticasone (FLONASE) 50 MCG/ACT nasal spray SPRAY TWO SPRAYS INTO AFFECTED NOSTRIL(S) DAILY AS NEEDED FOR RHINITIS  Tammie Cabrera MD   Cholecalciferol (VITAMIN D) 50 MCG (2000 UT) CAPS capsule Take 1 capsule by mouth daily  Tammie Cabrera MD   fexofenadine (ALLEGRA) 180 MG tablet Take 1 tablet by mouth daily as needed (AS NEEDED)  Tammie Cabrera MD   vitamin B-12 (CYANOCOBALAMIN) 500 MCG tablet Take 1 tablet by mouth daily  Luis Contreras MD   escitalopram (LEXAPRO) 5 MG tablet Take 5 mg by mouth daily  Historical Provider, MD   acetaminophen (TYLENOL) 325 MG tablet Take 2 tablets by mouth every 4 hours as needed for Pain  Guido Gillette MD   aspirin 81 MG tablet Take 81 mg by mouth daily  Historical Provider, MD   Multiple Vitamins-Minerals (THERAPEUTIC MULTIVITAMIN-MINERALS) tablet Take 1 tablet by mouth daily  Luis Contreras MD     ROS: COMPREHENSIVE ROS AS IN HX, REST -VE  History obtained from chart review and the patient    OBJECTIVE:   NURSING NOTE AND VITALS REVIEWED  /86 (Site: Right Upper Arm, Position: Sitting, Cuff Size: Medium Adult)   Pulse 59   Temp 97 °F (36.1 °C)   Ht 5' 6\" (1.676 m)   Wt 140 lb (63.5 kg)   SpO2 97%   BMI 22.60 kg/m²     NO ACUTE DISTRESS    REPEAT BP: 130/82 (RT), NO ORTHOSTASIS     REPEAT PULSE: 66 - MANUAL    Body mass index is 22.6 kg/m². HEENT: NO PALLOR, ANICTERIC, PERRLA, EOMI, NO CONJUNCTIVAL ERYTHEMA,                 NO SINUS TENDERNESS  NECK:  SUPPLE, TRACHEA MIDLINE, NT, NO JVD, NO CB, NO LA, NO TM, NO STIFFNESS  CHEST: RESPY EFFORT NL, GOOD AE, NO W/R/C  HEART: S1S2+ REG, NO M/G/R  ABD: SOFT, NT, NO HSM, BS+  EXT: NO EDEMA, NT, PULSES +. TWAN'S -VE  NEURO: ALERT AND ORIENTED X 3, NO MENINGEAL SIGNS, NO TREMORS, AMBULATING WITH A SLIGHT LIMP OTHERWISE, NO FOCAL DEFICITS  PSYCH: FAIRLY GOOD AFFECT  BACK: + TENDERNESS LOW BACK, + PAIN WITH MVT, NO ROM, NO CVA TENDERNESS, STRAIGHT LEG RAISING NEGATIVE    PREVIOUS LABS  REVIEWED AND D/W PT     UA: TRACE LEUKOCYTES      ASSESSMENT / PLAN:     Diagnosis Orders   1. Essential hypertension  COUNSELLED. CONTINUE MEDS. ADVISED LOW NA+ / DASH DIET/ EXERCISE. MONITOR. GOAL </= 120/80  F/U LABS  MAKE CHANGES AS NEEDED. 2. Mixed hyperlipidemia  COUNSELLED. CONTINUE PRAVACHOL  80 MG DAILY  ADVISED LOW FAT / CHOL DIET/ EXERCISE.  MONITOR.  F/U LABS  GOALS D/W

## 2020-09-16 NOTE — PROGRESS NOTES
Vaccine Information Sheet, \"Influenza - Inactivated\"  given to Natalya Lynch, or parent/legal guardian of  Natalya Lynch and verbalized understanding. Patient responses:    Have you ever had a reaction to a flu vaccine? No  Do you have any current illness? No  Have you ever had Guillian Roma Syndrome? No  Do you have a serious allergy to any of the follow: Neomycin, Polymyxin, Thimerosal, eggs or egg products? No    Flu vaccine given per order. Please see immunization tab. Risks and benefits explained. Current VIS given.       Immunizations Administered     Name Date Dose Route    Influenza, Triv, inactivated, subunit, adjuvanted, IM (Fluad 65 yrs and older) 9/16/2020 0.5 mL Intramuscular    Site: Deltoid- Left    Lot: [id number]    [address]: 26255-860-03

## 2020-09-22 DIAGNOSIS — I10 ESSENTIAL HYPERTENSION: ICD-10-CM

## 2020-09-22 DIAGNOSIS — E55.9 VITAMIN D DEFICIENCY: ICD-10-CM

## 2020-09-22 DIAGNOSIS — E78.2 MIXED HYPERLIPIDEMIA: ICD-10-CM

## 2020-09-22 DIAGNOSIS — R82.90 ABNORMAL URINALYSIS: ICD-10-CM

## 2020-09-22 DIAGNOSIS — D69.6 THROMBOCYTOPENIA (HCC): ICD-10-CM

## 2020-09-22 LAB
A/G RATIO: 1.9 (ref 1.1–2.2)
ALBUMIN SERPL-MCNC: 4.4 G/DL (ref 3.4–5)
ALP BLD-CCNC: 88 U/L (ref 40–129)
ALT SERPL-CCNC: 9 U/L (ref 10–40)
ANION GAP SERPL CALCULATED.3IONS-SCNC: 9 MMOL/L (ref 3–16)
AST SERPL-CCNC: 17 U/L (ref 15–37)
BASOPHILS ABSOLUTE: 0 K/UL (ref 0–0.2)
BASOPHILS RELATIVE PERCENT: 0.5 %
BILIRUB SERPL-MCNC: 1.5 MG/DL (ref 0–1)
BUN BLDV-MCNC: 12 MG/DL (ref 7–20)
CALCIUM SERPL-MCNC: 9.8 MG/DL (ref 8.3–10.6)
CHLORIDE BLD-SCNC: 102 MMOL/L (ref 99–110)
CHOLESTEROL, TOTAL: 188 MG/DL (ref 0–199)
CO2: 28 MMOL/L (ref 21–32)
CREAT SERPL-MCNC: 1 MG/DL (ref 0.6–1.2)
CREATININE URINE: 185.2 MG/DL (ref 28–259)
EOSINOPHILS ABSOLUTE: 0.1 K/UL (ref 0–0.6)
EOSINOPHILS RELATIVE PERCENT: 1.3 %
GFR AFRICAN AMERICAN: >60
GFR NON-AFRICAN AMERICAN: 53
GLOBULIN: 2.3 G/DL
GLUCOSE BLD-MCNC: 90 MG/DL (ref 70–99)
HCT VFR BLD CALC: 41.2 % (ref 36–48)
HDLC SERPL-MCNC: 49 MG/DL (ref 40–60)
HEMOGLOBIN: 13 G/DL (ref 12–16)
LDL CHOLESTEROL CALCULATED: 123 MG/DL
LYMPHOCYTES ABSOLUTE: 1.1 K/UL (ref 1–5.1)
LYMPHOCYTES RELATIVE PERCENT: 22.1 %
MCH RBC QN AUTO: 26.5 PG (ref 26–34)
MCHC RBC AUTO-ENTMCNC: 31.6 G/DL (ref 31–36)
MCV RBC AUTO: 83.8 FL (ref 80–100)
MICROALBUMIN UR-MCNC: <1.2 MG/DL
MICROALBUMIN/CREAT UR-RTO: NORMAL MG/G (ref 0–30)
MONOCYTES ABSOLUTE: 0.4 K/UL (ref 0–1.3)
MONOCYTES RELATIVE PERCENT: 7.4 %
NEUTROPHILS ABSOLUTE: 3.3 K/UL (ref 1.7–7.7)
NEUTROPHILS RELATIVE PERCENT: 68.7 %
PDW BLD-RTO: 14.4 % (ref 12.4–15.4)
PLATELET # BLD: 123 K/UL (ref 135–450)
PLATELET SLIDE REVIEW: ABNORMAL
PMV BLD AUTO: 11.6 FL (ref 5–10.5)
POTASSIUM SERPL-SCNC: 4.6 MMOL/L (ref 3.5–5.1)
RBC # BLD: 4.92 M/UL (ref 4–5.2)
SLIDE REVIEW: ABNORMAL
SODIUM BLD-SCNC: 139 MMOL/L (ref 136–145)
TOTAL PROTEIN: 6.7 G/DL (ref 6.4–8.2)
TRIGL SERPL-MCNC: 82 MG/DL (ref 0–150)
VITAMIN D 25-HYDROXY: 15.8 NG/ML
VLDLC SERPL CALC-MCNC: 16 MG/DL
WBC # BLD: 4.8 K/UL (ref 4–11)

## 2020-09-23 LAB — URINE CULTURE, ROUTINE: NORMAL

## 2020-10-16 ENCOUNTER — TELEPHONE (OUTPATIENT)
Dept: INTERNAL MEDICINE CLINIC | Age: 85
End: 2020-10-16

## 2020-10-16 NOTE — TELEPHONE ENCOUNTER
I called pt to let her know she didn't have a covid test done pt had labs drawn and she thought covid test was with the lab work

## 2021-01-06 ENCOUNTER — TELEPHONE (OUTPATIENT)
Dept: INTERNAL MEDICINE CLINIC | Age: 86
End: 2021-01-06

## 2021-01-06 NOTE — TELEPHONE ENCOUNTER
I called and spoke to pt pharmacist Debbie Chino she states pt never picked up 14 day supply of telimasartan  That was giving in august. Pharmacy will send request to get full refill

## 2021-01-07 DIAGNOSIS — J30.89 OTHER ALLERGIC RHINITIS: ICD-10-CM

## 2021-01-07 RX ORDER — FLUTICASONE PROPIONATE 50 MCG
SPRAY, SUSPENSION (ML) NASAL
Qty: 1 BOTTLE | Refills: 0 | Status: SHIPPED | OUTPATIENT
Start: 2021-01-07 | End: 2022-04-27 | Stop reason: CLARIF

## 2021-01-18 ENCOUNTER — NURSE TRIAGE (OUTPATIENT)
Dept: OTHER | Facility: CLINIC | Age: 86
End: 2021-01-18

## 2021-01-20 ENCOUNTER — TELEPHONE (OUTPATIENT)
Dept: INTERNAL MEDICINE CLINIC | Age: 86
End: 2021-01-20

## 2021-01-21 ENCOUNTER — OFFICE VISIT (OUTPATIENT)
Dept: INTERNAL MEDICINE CLINIC | Age: 86
End: 2021-01-21
Payer: MEDICARE

## 2021-01-21 VITALS
WEIGHT: 130 LBS | OXYGEN SATURATION: 93 % | SYSTOLIC BLOOD PRESSURE: 120 MMHG | DIASTOLIC BLOOD PRESSURE: 60 MMHG | HEART RATE: 83 BPM | HEIGHT: 66 IN | BODY MASS INDEX: 20.89 KG/M2

## 2021-01-21 DIAGNOSIS — F33.1 MODERATE EPISODE OF RECURRENT MAJOR DEPRESSIVE DISORDER (HCC): Primary | ICD-10-CM

## 2021-01-21 DIAGNOSIS — I10 ESSENTIAL HYPERTENSION: ICD-10-CM

## 2021-01-21 DIAGNOSIS — F32.A FATIGUE DUE TO DEPRESSION: ICD-10-CM

## 2021-01-21 DIAGNOSIS — R53.83 FATIGUE DUE TO DEPRESSION: ICD-10-CM

## 2021-01-21 DIAGNOSIS — R42 DIZZINESS: ICD-10-CM

## 2021-01-21 PROCEDURE — G8510 SCR DEP NEG, NO PLAN REQD: HCPCS | Performed by: NURSE PRACTITIONER

## 2021-01-21 PROCEDURE — G8482 FLU IMMUNIZE ORDER/ADMIN: HCPCS | Performed by: NURSE PRACTITIONER

## 2021-01-21 PROCEDURE — G8427 DOCREV CUR MEDS BY ELIG CLIN: HCPCS | Performed by: NURSE PRACTITIONER

## 2021-01-21 PROCEDURE — G8420 CALC BMI NORM PARAMETERS: HCPCS | Performed by: NURSE PRACTITIONER

## 2021-01-21 PROCEDURE — 99213 OFFICE O/P EST LOW 20 MIN: CPT | Performed by: NURSE PRACTITIONER

## 2021-01-21 PROCEDURE — 3288F FALL RISK ASSESSMENT DOCD: CPT | Performed by: NURSE PRACTITIONER

## 2021-01-21 PROCEDURE — G8400 PT W/DXA NO RESULTS DOC: HCPCS | Performed by: NURSE PRACTITIONER

## 2021-01-21 PROCEDURE — 1090F PRES/ABSN URINE INCON ASSESS: CPT | Performed by: NURSE PRACTITIONER

## 2021-01-21 PROCEDURE — 4040F PNEUMOC VAC/ADMIN/RCVD: CPT | Performed by: NURSE PRACTITIONER

## 2021-01-21 PROCEDURE — 1036F TOBACCO NON-USER: CPT | Performed by: NURSE PRACTITIONER

## 2021-01-21 PROCEDURE — 1123F ACP DISCUSS/DSCN MKR DOCD: CPT | Performed by: NURSE PRACTITIONER

## 2021-01-21 RX ORDER — AMLODIPINE BESYLATE 5 MG/1
5 TABLET ORAL DAILY
Qty: 30 TABLET | Refills: 0 | Status: SHIPPED | OUTPATIENT
Start: 2021-01-21 | End: 2021-02-23 | Stop reason: SDUPTHER

## 2021-01-21 SDOH — ECONOMIC STABILITY: TRANSPORTATION INSECURITY
IN THE PAST 12 MONTHS, HAS LACK OF TRANSPORTATION KEPT YOU FROM MEETINGS, WORK, OR FROM GETTING THINGS NEEDED FOR DAILY LIVING?: NO

## 2021-01-21 SDOH — ECONOMIC STABILITY: FOOD INSECURITY: WITHIN THE PAST 12 MONTHS, THE FOOD YOU BOUGHT JUST DIDN'T LAST AND YOU DIDN'T HAVE MONEY TO GET MORE.: NEVER TRUE

## 2021-01-21 SDOH — ECONOMIC STABILITY: TRANSPORTATION INSECURITY
IN THE PAST 12 MONTHS, HAS THE LACK OF TRANSPORTATION KEPT YOU FROM MEDICAL APPOINTMENTS OR FROM GETTING MEDICATIONS?: NO

## 2021-01-21 SDOH — ECONOMIC STABILITY: INCOME INSECURITY: HOW HARD IS IT FOR YOU TO PAY FOR THE VERY BASICS LIKE FOOD, HOUSING, MEDICAL CARE, AND HEATING?: NOT HARD AT ALL

## 2021-01-21 ASSESSMENT — ENCOUNTER SYMPTOMS
VOMITING: 0
CHEST TIGHTNESS: 0
ABDOMINAL PAIN: 0
DIARRHEA: 0
COUGH: 0
NAUSEA: 0
VISUAL CHANGE: 0

## 2021-01-21 ASSESSMENT — PATIENT HEALTH QUESTIONNAIRE - PHQ9
SUM OF ALL RESPONSES TO PHQ QUESTIONS 1-9: 2
1. LITTLE INTEREST OR PLEASURE IN DOING THINGS: 1

## 2021-01-21 NOTE — PATIENT INSTRUCTIONS
Follow up in 1 month for routine visit. Make appointment to see your psychiatrist for medication management for depression and discuss concerns.   Look for activities that you can safely participate in to help brain stimulation  Crossword puzzles, reading, etc for brain stimulation

## 2021-01-21 NOTE — PROGRESS NOTES
Syed Syed   YOB: 1935    Date of Visit:  1/21/2021      Chief Complaint   Patient presents with    Anxiety     depression     Dizziness    Fatigue       HPI Patient presents to the office complaining of depression, anxiety, and dizziness. Experienced the loss of her brother and mother within the last year. Says she has moved 3 times last year which was stressful. Only leaves the house to go to grocery store or pharmacy. No longer goes to [employer] but says it is due to a conflict with one of the members. Sees a psychiatrist every 2-3 months who is prescribing her depression medication (Lexapro). Has not discussed concerns with psychiatrist yet. Son and sister are support system. Also needs refill for blood amlodipine. Dizziness  This is a new problem. The current episode started 1 to 4 weeks ago. The problem occurs intermittently. The problem has been waxing and waning. Associated symptoms include anorexia (has not been eating much due to depression) and fatigue. Pertinent negatives include no abdominal pain, chest pain, chills, coughing, diaphoresis, fever, headaches, nausea, vertigo, visual change or vomiting. The symptoms are aggravated by exertion and bending. She has tried rest for the symptoms. The treatment provided significant relief. Fatigue  This is a new problem. The current episode started more than 1 month ago. The problem occurs daily. The problem has been unchanged. Associated symptoms include anorexia (has not been eating much due to depression) and fatigue. Pertinent negatives include no abdominal pain, chest pain, chills, coughing, diaphoresis, fever, headaches, nausea, vertigo, visual change or vomiting. Exacerbated by: Has no energy throughout the day. Only wants to sleep. She has tried rest for the symptoms. The treatment provided no relief. Mental Health Problem  The primary symptoms include dysphoric mood.  The primary symptoms do not include delusions, hallucinations, bizarre behavior or disorganized speech. The current episode started more than 1 month ago. This is a chronic problem. The onset of the illness is precipitated by emotional stress and a stressful event. The degree of incapacity that she is experiencing as a consequence of her illness is moderate. Additional symptoms of the illness include anhedonia, hypersomnia, appetite change, unexpected weight change, fatigue and attention impairment. Additional symptoms of the illness do not include agitation, feelings of worthlessness, visual change, headaches or abdominal pain. She does not admit to suicidal ideas. She does not have a plan to attempt suicide. She does not contemplate harming herself. She has not already injured self. She does not contemplate injuring another person. She has not already  injured another person. Risk factors that are present for mental illness include a history of mental illness. Allergies   Allergen Reactions    Nabumetone Swelling     Ankle     Outpatient Medications Marked as Taking for the 1/21/21 encounter (Office Visit) with NEPTALI Lan   Medication Sig Dispense Refill    amLODIPine (NORVASC) 5 MG tablet Take 1 tablet by mouth daily 30 tablet 0    telmisartan (MICARDIS) 40 MG tablet TAKE ONE TABLET BY MOUTH DAILY 30 tablet 0    fluticasone (FLONASE) 50 MCG/ACT nasal spray SPRAY TWO SPRAYS IN EACH NOSTRIL DAILY AS NEEDED FOR RHINITIS 1 Bottle 0    pravastatin (PRAVACHOL) 80 MG tablet TAKE ONE TABLET BY MOUTH ONCE NIGHTLY 90 tablet 0    Cholecalciferol (VITAMIN D) 50 MCG (2000 UT) CAPS capsule Take 1 capsule by mouth daily 90 capsule 0    lidocaine (XYLOCAINE) 5 % ointment Apply topically TID / as needed.  1 Tube 0    fexofenadine (ALLEGRA) 180 MG tablet Take 1 tablet by mouth daily as needed (AS NEEDED) 30 tablet 3    escitalopram (LEXAPRO) 5 MG tablet Take 5 mg by mouth daily      acetaminophen (TYLENOL) 325 MG tablet Take 2 tablets by mouth every 4 hours as needed for Pain 120 tablet 3    aspirin 81 MG tablet Take 81 mg by mouth daily         Health Maintenance Due   Topic    COVID-19 Vaccine (1 of 2)    Shingles Vaccine (1 of 2)    Annual Wellness Visit (AWV)        Review of Systems   Constitutional: Positive for appetite change, fatigue and unexpected weight change. Negative for chills, diaphoresis and fever. Eyes: Negative for visual disturbance. Respiratory: Negative for cough and chest tightness. Cardiovascular: Negative for chest pain, palpitations and leg swelling. Gastrointestinal: Positive for anorexia (has not been eating much due to depression). Negative for abdominal pain, diarrhea, nausea and vomiting. Genitourinary: Negative. Musculoskeletal: Positive for gait problem (uses a cane). Skin: Negative. Neurological: Positive for dizziness. Negative for vertigo and headaches. Psychiatric/Behavioral: Positive for dysphoric mood. Negative for agitation and hallucinations. Vitals:    01/21/21 1037   BP: 120/60   Site: Left Upper Arm   Position: Sitting   Cuff Size: Medium Adult   Pulse: 83   SpO2: 93%   Weight: 130 lb (59 kg)   Height: 5' 6\" (1.676 m)     Physical Exam  Constitutional:       General: She is not in acute distress. Appearance: She is well-groomed and normal weight. She is not ill-appearing, toxic-appearing or diaphoretic. Neck:      Musculoskeletal: Normal range of motion. No neck rigidity. Vascular: No carotid bruit. Cardiovascular:      Rate and Rhythm: Normal rate and regular rhythm. Pulses: Normal pulses. Heart sounds: Normal heart sounds. No murmur. No friction rub. No gallop. Pulmonary:      Effort: Pulmonary effort is normal. No respiratory distress. Breath sounds: Normal breath sounds. No stridor. No wheezing, rhonchi or rales. Abdominal:      General: Bowel sounds are normal.      Palpations: Abdomen is soft. Musculoskeletal: Normal range of motion.    Skin: General: Skin is warm and dry. Neurological:      Mental Status: She is alert and oriented to person, place, and time. Psychiatric:         Attention and Perception: Attention and perception normal.         Mood and Affect: Mood is depressed. Speech: Speech normal.         Behavior: Behavior is slowed. Behavior is cooperative. Thought Content: Thought content normal.         Cognition and Memory: Cognition and memory normal. Abnormal recent memory: Unable to recall name of psychiatrist. Otherwise intact. Judgment: Judgment normal.         Assessment/Plan     1. Moderate episode of recurrent major depressive disorder ([facility] [address]  2. Fatigue due to depression  - Schedule appointment with psychiatrist to discuss concerns and for medication adjustments. 3. Dizziness  - Eat well balanced diet. Eat small meals throughout the day if appetite is low. 4. Essential hypertension  - amLODIPine (NORVASC) 5 MG tablet; Take 1 tablet by mouth daily  Dispense: 30 tablet; Refill: 0  - Follow up in 1 month. Discussed medications with patient, who voiced understanding of their use and indications. All questions answered. Pt is advised to call if symptoms worsen or do not improve.

## 2021-02-17 ENCOUNTER — TELEPHONE (OUTPATIENT)
Dept: INTERNAL MEDICINE CLINIC | Age: 86
End: 2021-02-17

## 2021-02-17 NOTE — TELEPHONE ENCOUNTER
Patient is experiencing paranoia and memory loss. The son wants to know if there is someone she needs to see in particular.  Please advise

## 2021-02-18 ENCOUNTER — TELEPHONE (OUTPATIENT)
Dept: INTERNAL MEDICINE CLINIC | Age: 86
End: 2021-02-18

## 2021-02-18 DIAGNOSIS — I10 ESSENTIAL HYPERTENSION: ICD-10-CM

## 2021-02-18 RX ORDER — TELMISARTAN 40 MG/1
TABLET ORAL
Qty: 30 TABLET | Refills: 0 | Status: SHIPPED | OUTPATIENT
Start: 2021-02-18 | End: 2021-03-19 | Stop reason: SDUPTHER

## 2021-02-18 NOTE — TELEPHONE ENCOUNTER
Pt is becoming mor paranoid and is thinking someone is coming in her house taking things pt sone and daughter is getting very concerned about the situation pt states she is seeing Rowdy Childers who is her psych  [phone]

## 2021-02-18 NOTE — TELEPHONE ENCOUNTER
Patient son is needing name of psychiatrist physician wants his mother to see and referral please advise

## 2021-02-18 NOTE — TELEPHONE ENCOUNTER
I called pt son to let him know that pt needs to see a psychiatrist and to make an appt to see dr Karyna Chanel got the v/m left message

## 2021-02-18 NOTE — TELEPHONE ENCOUNTER
Patient son is needing name of psychiatrist physician wants his mother to see and referral please advise.

## 2021-02-18 NOTE — TELEPHONE ENCOUNTER
Pt son would like to know the name of the psych dr his mom was seeing pt son was not aware of his mom seeing a psych

## 2021-02-23 ENCOUNTER — TELEPHONE (OUTPATIENT)
Dept: INTERNAL MEDICINE CLINIC | Age: 86
End: 2021-02-23

## 2021-02-23 DIAGNOSIS — R41.89 COGNITIVE DECLINE: ICD-10-CM

## 2021-02-23 DIAGNOSIS — I10 ESSENTIAL HYPERTENSION: ICD-10-CM

## 2021-02-23 DIAGNOSIS — R53.83 OTHER FATIGUE: ICD-10-CM

## 2021-02-23 RX ORDER — AMLODIPINE BESYLATE 5 MG/1
5 TABLET ORAL DAILY
Qty: 30 TABLET | Refills: 0 | Status: SHIPPED | OUTPATIENT
Start: 2021-02-23 | End: 2021-03-19 | Stop reason: SDUPTHER

## 2021-02-23 NOTE — TELEPHONE ENCOUNTER
Patient needs a refill of amlodipine. Please send to [facility] in [facility].  Also stated she needs her Vitamin B-12

## 2021-02-23 NOTE — TELEPHONE ENCOUNTER
Patient needs a refill of amlodipine. Please send to [facility] in [facility].  Also stated she needs her Vitamin B-12

## 2021-02-24 NOTE — TELEPHONE ENCOUNTER
I called pt to let her know that her med was refilled and that they will address b12 at her visit. Pt sound confused and was taking about a group meeting.  I had to let her know I was calling from her pcp several x

## 2021-02-26 DIAGNOSIS — E78.2 MIXED HYPERLIPIDEMIA: ICD-10-CM

## 2021-02-28 RX ORDER — PRAVASTATIN SODIUM 80 MG/1
TABLET ORAL
Qty: 90 TABLET | Refills: 0 | Status: SHIPPED | OUTPATIENT
Start: 2021-02-28 | End: 2021-04-29 | Stop reason: SDUPTHER

## 2021-03-19 ENCOUNTER — OFFICE VISIT (OUTPATIENT)
Dept: INTERNAL MEDICINE CLINIC | Age: 86
End: 2021-03-19
Payer: MEDICARE

## 2021-03-19 VITALS
BODY MASS INDEX: 21.69 KG/M2 | DIASTOLIC BLOOD PRESSURE: 80 MMHG | HEART RATE: 70 BPM | TEMPERATURE: 98.5 F | HEIGHT: 66 IN | WEIGHT: 135 LBS | OXYGEN SATURATION: 98 % | SYSTOLIC BLOOD PRESSURE: 138 MMHG

## 2021-03-19 DIAGNOSIS — E78.2 MIXED HYPERLIPIDEMIA: ICD-10-CM

## 2021-03-19 DIAGNOSIS — D69.6 THROMBOCYTOPENIA (HCC): ICD-10-CM

## 2021-03-19 DIAGNOSIS — E55.9 VITAMIN D DEFICIENCY: ICD-10-CM

## 2021-03-19 DIAGNOSIS — I10 ESSENTIAL HYPERTENSION: ICD-10-CM

## 2021-03-19 DIAGNOSIS — G30.1 LATE ONSET ALZHEIMER'S DISEASE WITHOUT BEHAVIORAL DISTURBANCE (HCC): ICD-10-CM

## 2021-03-19 DIAGNOSIS — F33.1 MODERATE EPISODE OF RECURRENT MAJOR DEPRESSIVE DISORDER (HCC): ICD-10-CM

## 2021-03-19 DIAGNOSIS — Z00.00 ROUTINE GENERAL MEDICAL EXAMINATION AT A HEALTH CARE FACILITY: Primary | ICD-10-CM

## 2021-03-19 DIAGNOSIS — F02.80 LATE ONSET ALZHEIMER'S DISEASE WITHOUT BEHAVIORAL DISTURBANCE (HCC): ICD-10-CM

## 2021-03-19 DIAGNOSIS — J30.89 OTHER ALLERGIC RHINITIS: ICD-10-CM

## 2021-03-19 LAB
A/G RATIO: 1.8 (ref 1.1–2.2)
ALBUMIN SERPL-MCNC: 4.2 G/DL (ref 3.4–5)
ALP BLD-CCNC: 71 U/L (ref 40–129)
ALT SERPL-CCNC: 10 U/L (ref 10–40)
ANION GAP SERPL CALCULATED.3IONS-SCNC: 9 MMOL/L (ref 3–16)
AST SERPL-CCNC: 19 U/L (ref 15–37)
BASOPHILS ABSOLUTE: 0 K/UL (ref 0–0.2)
BASOPHILS RELATIVE PERCENT: 0.5 %
BILIRUB SERPL-MCNC: 1.1 MG/DL (ref 0–1)
BILIRUBIN URINE: NEGATIVE
BLOOD, URINE: NEGATIVE
BUN BLDV-MCNC: 10 MG/DL (ref 7–20)
CALCIUM SERPL-MCNC: 9.3 MG/DL (ref 8.3–10.6)
CHLORIDE BLD-SCNC: 106 MMOL/L (ref 99–110)
CHOLESTEROL, TOTAL: 190 MG/DL (ref 0–199)
CLARITY: CLEAR
CO2: 29 MMOL/L (ref 21–32)
COLOR: YELLOW
CREAT SERPL-MCNC: 0.8 MG/DL (ref 0.6–1.2)
CREATININE URINE: 180.5 MG/DL (ref 28–259)
EOSINOPHILS ABSOLUTE: 0.1 K/UL (ref 0–0.6)
EOSINOPHILS RELATIVE PERCENT: 1.7 %
FOLATE: 12.94 NG/ML (ref 4.78–24.2)
GFR AFRICAN AMERICAN: >60
GFR NON-AFRICAN AMERICAN: >60
GLOBULIN: 2.4 G/DL
GLUCOSE BLD-MCNC: 90 MG/DL (ref 70–99)
GLUCOSE URINE: NEGATIVE MG/DL
HCT VFR BLD CALC: 38.4 % (ref 36–48)
HDLC SERPL-MCNC: 48 MG/DL (ref 40–60)
HEMOGLOBIN: 12.3 G/DL (ref 12–16)
KETONES, URINE: NEGATIVE MG/DL
LDL CHOLESTEROL CALCULATED: 126 MG/DL
LEUKOCYTE ESTERASE, URINE: NEGATIVE
LYMPHOCYTES ABSOLUTE: 1.2 K/UL (ref 1–5.1)
LYMPHOCYTES RELATIVE PERCENT: 24.8 %
MCH RBC QN AUTO: 26.8 PG (ref 26–34)
MCHC RBC AUTO-ENTMCNC: 32 G/DL (ref 31–36)
MCV RBC AUTO: 83.7 FL (ref 80–100)
MICROALBUMIN UR-MCNC: <1.2 MG/DL
MICROALBUMIN/CREAT UR-RTO: NORMAL MG/G (ref 0–30)
MICROSCOPIC EXAMINATION: NORMAL
MONOCYTES ABSOLUTE: 0.4 K/UL (ref 0–1.3)
MONOCYTES RELATIVE PERCENT: 8 %
NEUTROPHILS ABSOLUTE: 3.2 K/UL (ref 1.7–7.7)
NEUTROPHILS RELATIVE PERCENT: 65 %
NITRITE, URINE: NEGATIVE
PDW BLD-RTO: 14.9 % (ref 12.4–15.4)
PH UA: 5.5 (ref 5–8)
PLATELET # BLD: 110 K/UL (ref 135–450)
PMV BLD AUTO: 11.2 FL (ref 5–10.5)
POTASSIUM SERPL-SCNC: 4.3 MMOL/L (ref 3.5–5.1)
PROTEIN UA: NEGATIVE MG/DL
RBC # BLD: 4.58 M/UL (ref 4–5.2)
SODIUM BLD-SCNC: 144 MMOL/L (ref 136–145)
SPECIFIC GRAVITY UA: 1.02 (ref 1–1.03)
TOTAL PROTEIN: 6.6 G/DL (ref 6.4–8.2)
TRIGL SERPL-MCNC: 81 MG/DL (ref 0–150)
TSH REFLEX: 1.69 UIU/ML (ref 0.27–4.2)
URINE REFLEX TO CULTURE: NORMAL
URINE TYPE: NORMAL
UROBILINOGEN, URINE: 0.2 E.U./DL
VITAMIN B-12: 491 PG/ML (ref 211–911)
VITAMIN D 25-HYDROXY: 29.6 NG/ML
VLDLC SERPL CALC-MCNC: 16 MG/DL
WBC # BLD: 5 K/UL (ref 4–11)

## 2021-03-19 PROCEDURE — G0439 PPPS, SUBSEQ VISIT: HCPCS | Performed by: INTERNAL MEDICINE

## 2021-03-19 PROCEDURE — 1123F ACP DISCUSS/DSCN MKR DOCD: CPT | Performed by: INTERNAL MEDICINE

## 2021-03-19 PROCEDURE — G8482 FLU IMMUNIZE ORDER/ADMIN: HCPCS | Performed by: INTERNAL MEDICINE

## 2021-03-19 PROCEDURE — 4040F PNEUMOC VAC/ADMIN/RCVD: CPT | Performed by: INTERNAL MEDICINE

## 2021-03-19 RX ORDER — MULTIVIT-MIN/IRON/FOLIC ACID/K 18-600-40
1 CAPSULE ORAL DAILY
Qty: 90 CAPSULE | Refills: 0 | Status: SHIPPED | OUTPATIENT
Start: 2021-03-19 | End: 2021-04-29 | Stop reason: SDUPTHER

## 2021-03-19 RX ORDER — AMLODIPINE BESYLATE 5 MG/1
5 TABLET ORAL DAILY
Qty: 90 TABLET | Refills: 0 | Status: SHIPPED | OUTPATIENT
Start: 2021-03-19 | End: 2021-04-29 | Stop reason: SDUPTHER

## 2021-03-19 RX ORDER — TELMISARTAN 40 MG/1
40 TABLET ORAL DAILY
Qty: 90 TABLET | Refills: 0 | Status: SHIPPED | OUTPATIENT
Start: 2021-03-19 | End: 2021-04-29 | Stop reason: SDUPTHER

## 2021-03-19 RX ORDER — DONEPEZIL HYDROCHLORIDE 5 MG/1
5 TABLET, FILM COATED ORAL NIGHTLY
Qty: 30 TABLET | Refills: 3 | Status: SHIPPED
Start: 2021-03-19 | End: 2021-04-29 | Stop reason: DRUGHIGH

## 2021-03-19 ASSESSMENT — PATIENT HEALTH QUESTIONNAIRE - PHQ9
2. FEELING DOWN, DEPRESSED OR HOPELESS: 1
SUM OF ALL RESPONSES TO PHQ QUESTIONS 1-9: 2
1. LITTLE INTEREST OR PLEASURE IN DOING THINGS: 1
SUM OF ALL RESPONSES TO PHQ QUESTIONS 1-9: 2
SUM OF ALL RESPONSES TO PHQ QUESTIONS 1-9: 2

## 2021-03-19 NOTE — PROGRESS NOTES
Medicare Annual Wellness Visit  Name: Mitchel Bolton Date: 3/19/2021   MRN: <[id number]> Sex: Female   Age: 81 y.o. Ethnicity: Non-/Non    : 1935 Race: Ramiro Davila is here for Hypertension, Hyperlipidemia, and Medicare AWV  PT HERE WITH SON    LAST PHYSICAL > 1 YR    C/O FORGETFULNESS - PER SON. WORSENING PAST 1 YEAR. STATES PT LOSING THINGS, GOT LOST TWICE  HTN - TAKING MEDS. BP NOTED. ? DIET / EXERCISE COMPLIANCE. Quentin Connell. HLP - TAKING  MED . + EXERCISE / DIET  COMPLIANCE .  ? MUSCLE CRAMPS / [employer].   VIT D DEF -  ? MED [id number] . DENIES SUICIDAL / NO HOMICIDAL THOUGHTS Kashif Seed  WITH PSYCH  THROMBOCYTOPENIA - CHRONIC.   [address] ALLERGIC RHINITIS -  NO RHINORRHEA, ? NASAL CONGESTION , NO  POSTNASAL DRAINAGE , NO SINUS PRESSURE, NO HA, NO SNEEZING, OCC WATERY ITCHY EYES.       DENIES CP, No SOB, No PALPITATIONS, No COUGH, NO F/C  No ABD PAIN, No N/V, No DIARRHEA, No CONSTIPATION, No MELENA, No HEMATOCHEZIA. [place], No HEMATURIA      Screenings for behavioral, psychosocial and functional/safety risks, and cognitive dysfunction are all negative except as indicated below. These results, as well as other patient data from the [address] form, are documented in Flowsheets linked to this Encounter. Allergies   Allergen Reactions    Nabumetone Swelling     Ankle         Prior to Visit Medications    Medication Sig Taking?  Authorizing Provider   pravastatin (PRAVACHOL) 80 MG tablet TAKE ONE TABLET BY MOUTH ONCE NIGHTLY Yes Joon Castellano MD   amLODIPine (NORVASC) 5 MG tablet Take 1 tablet by mouth daily Yes Joon Castellano MD   telmisartan (MICARDIS) 40 MG tablet TAKE ONE TABLET BY MOUTH DAILY Yes Joon Castellano MD   fluticasone (FLONASE) 50 MCG/ACT nasal spray SPRAY TWO SPRAYS IN EACH NOSTRIL DAILY AS NEEDED FOR RHINITIS Yes Facundo Melendez MD   Cholecalciferol (VITAMIN D) 50 MCG (2000 UT) CAPS capsule Take 1 capsule by mouth daily Yes Facundo Melendez MD   lidocaine (XYLOCAINE) 5 % ointment Apply topically TID / as needed. Yes Facundo Melendez MD   fexofenadine (ALLEGRA) 180 MG tablet Take 1 tablet by mouth daily as needed (AS NEEDED) Yes Facundo Melendez MD   escitalopram (LEXAPRO) 5 MG tablet Take 5 mg by mouth daily Yes Historical Provider, MD   acetaminophen (TYLENOL) 325 MG tablet Take 2 tablets by mouth every 4 hours as needed for Pain Yes Amanda Aquino MD   aspirin 81 MG tablet Take 81 mg by mouth daily Yes Historical Provider, MD   vitamin B-12 (CYANOCOBALAMIN) 500 MCG tablet Take 1 tablet by mouth daily  Facundo Melendez MD   Multiple Vitamins-Minerals (THERAPEUTIC MULTIVITAMIN-MINERALS) tablet Take 1 tablet by mouth daily  Facundo Melendez MD         Past Medical History:   Diagnosis Date    Allergic rhinitis     Depression     DJD (degenerative joint disease)     Hyperlipidemia     Hypertension     Thrombocytopenia ([address]        Past Surgical History:   Procedure Laterality Date    BREAST LUMPECTOMY      RT - BENIGN    CATARACT REMOVAL WITH IMPLANT      no implant per pt     HEMORRHOID SURGERY      HYSTERECTOMY           Family History   Problem Relation Age of Onset    High Blood Pressure Mother     High Blood Pressure Sister     Heart Disease Brother     High Blood Pressure Brother     Stroke Paternal Uncle     Cancer Maternal Grandmother         ?  FEMALE ORGAN    High Blood Pressure Other         SON    Diabetes Paternal Aunt        CareTeam (Including outside providers/suppliers regularly involved in providing care):   Patient Care Team:  Facundo Melendez MD as PCP - General (Internal Medicine)  Facundo Melendez MD as PCP - [facility] Empaneled Provider    Wt Readings from Last 3 Encounters:   03/19/21 135 lb (61.2 kg)   01/21/21 130 lb (59 kg)   09/16/20 140 lb (63.5 kg)     Vitals: 03/19/21 1101   BP: (!) 140/78   Site: Right Upper Arm   Position: Sitting   Cuff Size: Medium Adult   Pulse: 70   Temp: 98.5 °F (36.9 °C)   SpO2: 98%   Weight: 135 lb (61.2 kg)   Height: 5' 6\" (1.676 m)       Based upon direct observation of the patient, evaluation of cognition reveals RECENT MEMORY IMPAIRED. Patient's complete Health Risk Assessment and screening values have been reviewed and are found in Flowsheets. The following problems were reviewed today and where indicated follow up appointments were made and/or referrals ordered. Positive Risk Factor Screenings with Interventions:            General Health and ACP:  General  In general, how would you say your health is?: Fair  In the past 7 days, have you experienced any of the following?  New or Increased Pain, New or Increased Fatigue, Loneliness, Social Isolation, Stress or Anger?: (!) Stress  Do you get the social and emotional support that you need?: Yes  Do you have a Living Will?: (!) No  Advance Directives     Power of  Living Will ACP-Advance Directive ACP-Power of     Not on File Filed on 10/26/17 Filed Not on File      General Health Risk Interventions:  · Stress: regular exercise recommended- 3-5 times per week, 30-45 minutes per session, relaxation techniques discussed  · No Living Will: PT WILL READDRESS WITH SON / [address] Habits/Nutrition:  Health Habits/Nutrition  Do you exercise for at least 20 minutes 2-3 times per week?: (!) No  Have you lost any weight without trying in the past 3 months?: (!) Yes  Do you eat only one meal per day?: No  Have you seen the dentist within the past year?: (!) No  Body mass index: 21.79  Health Habits/Nutrition Interventions:  · Inadequate physical activity:  patient agrees to exercise for at least 150 minutes/week  · Nutritional issues:  ADVISED ON DIET  · Dental exam overdue:  patient encouraged to make appointment with his/her dentist    Hearing/Vision:  No exam data ANICTERIC, PERRLA, EOMI, NO CONJUNCTIVAL ERYTHEMA,                 NO SINUS TENDERNESS, NO CERUMEN IMPACTION, NO TM ERYTHEMA  NECK:  SUPPLE, TRACHEA MIDLINE, NT, NO JVD, NO CB, NO LA, NO TM, NO STIFFNESS  CHEST: RESPY EFFORT NL, GOOD AE, NO W/R/C  HEART: S1S2+ REG, NO M/G/R  ABD: SOFT, NT, NO HSM, BS+  EXT: NO EDEMA, NT, PULSES +. TWAN'S -VE  NEURO: ALERT AND ORIENTED X 2 - CONFUSED WITH DATE, NO MENINGEAL SIGNS, NO TREMORS, NL GAIT, NO FOCAL DEFICITS  PSYCH: FAIRLY GOOD AFFECT. RECALL 0/3  BACK: NT, NO ROM, NO CVA TENDERNESS  SKIN: NO BRUISING NOTED    PREVIOUS LABS  REVIEWED AND D/W PT     Diagnoses and all orders for this visit:  ASSESSMENT / PLAN:     Diagnosis Orders   1. Routine general medical examination at a health care facility  COUNSELLED. HEALTH / SAFETY EDUCATION REVIEWED AND ADVISED. HEALTH MAINTENANCE UPDATED  F/U FASTING LABS - HIV WITH LABS - OK WITH PT  IMMUNIZATION REVIEWED - READDRESS  ADVISED ON DIET, ADEUATE EXERCISE, STRESS MGT  ADVISED ON LIVING WILL, F/U DENTAL AND EYE EAM  D/W PT AND SON  MAKE CHANGES AS NEEDED. 2. Late onset Alzheimer's disease without behavioral disturbance ([facility]  COUNSELLED. START ON ARICEPT 5 MG QPM  D/W PT AND SON. - ADVISED ON SUPERVISED CARE. ADVISED ENCOURAGE INTERACTIVE ACTIVITY  LABS, CT TO EVAL  MAKE CHANGES AS NEEDED. 3. Essential hypertension  COUNSELLED. REPEAT BP AS ABOVE  CONTINUE MEDS. ADVISED LOW NA+ / DASH DIET/ EXERCISE. MONITOR. GOAL </= 120/80  F/U LABS  MAKE CHANGES AS NEEDED. 4. Mixed hyperlipidemia  COUNSELLED. CONTINUE PRAVACHOL  ADVISED LOW FAT / CHOL DIET/ EXERCISE.  MONITOR. F/U LABS  GOALS D/W PT.  MAKE CHANGES AS NEEDED. 5. Vitamin D deficiency  COUNSELLED. ADVISED MED COMPLIANCE - ADVISED VIT D 2000 U DAILY. MONITOR AND MAKE CHANGES AS NEEDED. 6. Moderate episode of recurrent major depressive disorder ([facility]  COUNSELLED. CONTINUE MED PER MH  PT COUNSELLED  ON RELAXATION TECHNIQUES. ADDRESSED STRESS MGT. MONITOR  PT NOT SUICIDAL/ NOT HOMICIDAL  MAKE CHANGES AS NEEDED. 7. Thrombocytopenia ([address]  COUNSELLED,. CHRONIC. ASYMPTOMATIC. MONITOR  MAKE CHANGES AS NEEDED. 8. Other allergic rhinitis  COUNSELLED. MED PRN. MONITOR  MAKE CHANGES AS NEEDED. Recommendations for Preventive Services Due: see orders and patient instructions/AVS.    Recommended screening schedule for the next 5-10 years is provided to the patient in written form: see Patient Instructions/AVS.    Ángel Guerra was seen today for hypertension, hyperlipidemia and medicare awv.           MEDICATION SIDE EFFECTS D/W PATIENT    PT STABLE AT TIME OF D/C.    RETURN TO CLINIC WITHIN 6 WEEKS / PRN    FOLLOW UP FOR FASTING LABS, CAT SCAN, EYE EXAM, DENTIST

## 2021-03-19 NOTE — PATIENT INSTRUCTIONS
TAKE MED AS ADVISED    DIET/ EXERCISE. FOLLOW UP WITHIN 6 WEEKS / AS NEEDED     FOLLOW UP FOR FASTING LABS, CAT SCAN, EYE EXAM, DENTIST    Personalized Preventive Plan for Akiko Chen - 3/19/2021  Medicare offers a range of preventive health benefits. Some of the tests and screenings are paid in full while other may be subject to a deductible, co-insurance, and/or copay. Some of these benefits include a comprehensive review of your medical history including lifestyle, illnesses that may run in your family, and various assessments and screenings as appropriate. After reviewing your medical record and screening and assessments performed today your provider may have ordered immunizations, labs, imaging, and/or referrals for you. A list of these orders (if applicable) as well as your Preventive Care list are included within your After Visit Summary for your review. Other Preventive Recommendations:    · A preventive eye exam performed by an eye specialist is recommended every 1-2 years to screen for glaucoma; cataracts, macular degeneration, and other eye disorders. · A preventive dental visit is recommended every 6 months. · Try to get at least 150 minutes of exercise per week or 10,000 steps per day on a pedometer . · Order or download the FREE \"Exercise & Physical Activity: Your Everyday Guide\" from [employer]. Call 2-636.493.6618 or search [employer] online. · You need 3756-6670 mg of calcium and 9198-5704 IU of vitamin D per day. It is possible to meet your calcium requirement with diet alone, but a vitamin D supplement is usually necessary to meet this goal.  · When exposed to the sun, use a sunscreen that protects against both UVA and UVB radiation with an SPF of 30 or greater. Reapply every 2 to 3 hours or after sweating, drying off with a towel, or swimming. · Always wear a seat belt when traveling in a car.  Always wear a helmet when riding a bicycle or motorcycle.

## 2021-03-20 LAB — TOTAL SYPHILLIS IGG/IGM: NORMAL

## 2021-03-23 ENCOUNTER — HOSPITAL ENCOUNTER (OUTPATIENT)
Dept: CT IMAGING | Age: 86
Discharge: HOME | End: 2021-03-23
Payer: MEDICARE

## 2021-03-23 ENCOUNTER — TELEPHONE (OUTPATIENT)
Dept: NEUROLOGY | Age: 86
End: 2021-03-23

## 2021-03-23 PROCEDURE — 70450 CT HEAD/BRAIN W/O DYE: CPT

## 2021-03-23 NOTE — TELEPHONE ENCOUNTER
Call from pt's son Mini Dunn states a referral was faxed from Dr Choco Wills (spelling?) to see neuro Dr. David Cabrera see referral in Epic or media.   CB# [phone]  For -son

## 2021-03-30 ENCOUNTER — OFFICE VISIT (OUTPATIENT)
Dept: NEUROLOGY | Age: 86
End: 2021-03-30
Payer: MEDICARE

## 2021-03-30 VITALS
TEMPERATURE: 97.4 F | SYSTOLIC BLOOD PRESSURE: 121 MMHG | DIASTOLIC BLOOD PRESSURE: 67 MMHG | HEIGHT: 66 IN | WEIGHT: 136 LBS | HEART RATE: 76 BPM | RESPIRATION RATE: 14 BRPM | BODY MASS INDEX: 21.86 KG/M2 | OXYGEN SATURATION: 98 %

## 2021-03-30 DIAGNOSIS — G30.1 LATE ONSET ALZHEIMER'S DISEASE WITHOUT BEHAVIORAL DISTURBANCE (HCC): Primary | ICD-10-CM

## 2021-03-30 DIAGNOSIS — F02.80 LATE ONSET ALZHEIMER'S DISEASE WITHOUT BEHAVIORAL DISTURBANCE (HCC): Primary | ICD-10-CM

## 2021-03-30 PROCEDURE — 4040F PNEUMOC VAC/ADMIN/RCVD: CPT | Performed by: PSYCHIATRY & NEUROLOGY

## 2021-03-30 PROCEDURE — 1090F PRES/ABSN URINE INCON ASSESS: CPT | Performed by: PSYCHIATRY & NEUROLOGY

## 2021-03-30 PROCEDURE — 1036F TOBACCO NON-USER: CPT | Performed by: PSYCHIATRY & NEUROLOGY

## 2021-03-30 PROCEDURE — 1123F ACP DISCUSS/DSCN MKR DOCD: CPT | Performed by: PSYCHIATRY & NEUROLOGY

## 2021-03-30 PROCEDURE — G8400 PT W/DXA NO RESULTS DOC: HCPCS | Performed by: PSYCHIATRY & NEUROLOGY

## 2021-03-30 PROCEDURE — 99204 OFFICE O/P NEW MOD 45 MIN: CPT | Performed by: PSYCHIATRY & NEUROLOGY

## 2021-03-30 PROCEDURE — G8427 DOCREV CUR MEDS BY ELIG CLIN: HCPCS | Performed by: PSYCHIATRY & NEUROLOGY

## 2021-03-30 PROCEDURE — G8482 FLU IMMUNIZE ORDER/ADMIN: HCPCS | Performed by: PSYCHIATRY & NEUROLOGY

## 2021-03-30 PROCEDURE — G8420 CALC BMI NORM PARAMETERS: HCPCS | Performed by: PSYCHIATRY & NEUROLOGY

## 2021-03-30 NOTE — PROGRESS NOTES
None     Relationship status: None    Intimate partner violence     Fear of current or ex partner: None     Emotionally abused: None     Physically abused: None     Forced sexual activity: None   Other Topics Concern    None   Social History Narrative    None       PHYSICAL EXAMINATION:  /67   Pulse 76   Temp 97.4 °F (36.3 °C)   Resp 14   Ht 5' 6\" (1.676 m)   Wt 136 lb (61.7 kg)   SpO2 98%   BMI 21.95 kg/m²   Appearance: Well appearing, well nourished and in no distress  Mental Status Exam: Patient is alert, oriented x2. Poor short-term memory. Could recall not more than 1 out of 3 objects in 3 minutes. Unable to do serial sevens. Fund of knowledge is limited. Attention/concentration is normal.   Speech : No dysarthria  Language : Mild word finding difficulties. Funduscopic Exam: sharp disc margins  Cranial Nerves:   II: Visual fields:  Full to confrontation  III: Pupils:  equal, round, reactive to light  III,IV,VI: Extra Ocular Movements are intact. No nystagmus  V: Facial sensation is intact to pin prick and light touch  VII: Facial strength and movements: intact and symmetric smile,cheek puffing and eyebrow elevation  VIII: Hearing:  Intact to finger rub bilaterally  IX: Palate  elevation is symmetric  XI: Shoulder shrug is intact  XII: Tongue movements are normal  Motor:  Muscle tone and bulk are normal.   Strength is symmetrical 5/5 in all four extremities. Sensory: Intact to light touch and  pin prick in all four extremities  Coordination:  Normal  Finger to Nose and Heel to Shin bilaterally    . Reflexes:  DTR +2 and symmetric bilaterally  Plantar response: Flexor bilaterally  Gait: Gait and station is normal. Patient can toe/ heel and tandem walk without difficulty  Romberg: negative  Vascular: No carotid bruit bilaterally        DATA:  LABS:  General Labs:    CBC:   Lab Results   Component Value Date    WBC 5.0 03/19/2021    RBC 4.58 03/19/2021    RBC 4.60 03/28/2017    HGB 12.3 03/19/2021    HCT 38.4 03/19/2021    MCV 83.7 03/19/2021    MCH 26.8 03/19/2021    MCHC 32.0 03/19/2021    RDW 14.9 03/19/2021     03/19/2021    MPV 11.2 03/19/2021     BMP:    Lab Results   Component Value Date     03/19/2021    K 4.3 03/19/2021    K 4.1 10/01/2018     03/19/2021    CO2 29 03/19/2021    BUN 10 03/19/2021    LABALBU 4.2 03/19/2021    CREATININE 0.8 03/19/2021    CALCIUM 9.3 03/19/2021    GFRAA >60 03/19/2021    GFRAA >60 05/29/2013    LABGLOM >60 03/19/2021    GLUCOSE 90 03/19/2021    GLUCOSE 98 03/28/2017     RADIOLOGY REVIEW:  I have reviewed radiology image(s) and reports(s) of: CT scan of the head    IMPRESSION :  Late onset Alzheimer's type dementia  Significant memory impairment  CT head images were independently reviewed with the patient and her son today in the office. There was no global atrophy seen. TSH and B12 levels were normal    RECOMMENDATIONS :  Discussed with patient and her son  Continue Aricept 5 mg at night  I will see her back in 2 months  At that time we will consider increasing the dose or adding Namenda  We discussed at length about her living situation. Patient son will talk to her more about some type of controlled environment as the disease progresses. Thank you for this consultation. Please note a portion of this chart was generated using dragon dictation software. Although every effort was made to ensure the accuracy of this automated transcription, some errors in transcription may have occurred.

## 2021-04-29 ENCOUNTER — OFFICE VISIT (OUTPATIENT)
Dept: INTERNAL MEDICINE CLINIC | Age: 86
End: 2021-04-29
Payer: MEDICARE

## 2021-04-29 VITALS
SYSTOLIC BLOOD PRESSURE: 130 MMHG | TEMPERATURE: 97 F | HEART RATE: 63 BPM | OXYGEN SATURATION: 95 % | HEIGHT: 66 IN | BODY MASS INDEX: 20.89 KG/M2 | DIASTOLIC BLOOD PRESSURE: 78 MMHG | WEIGHT: 130 LBS

## 2021-04-29 DIAGNOSIS — R39.89 ABNORMAL URINE COLOR: ICD-10-CM

## 2021-04-29 DIAGNOSIS — E78.2 MIXED HYPERLIPIDEMIA: ICD-10-CM

## 2021-04-29 DIAGNOSIS — J30.89 OTHER ALLERGIC RHINITIS: ICD-10-CM

## 2021-04-29 DIAGNOSIS — D69.6 THROMBOCYTOPENIA (HCC): ICD-10-CM

## 2021-04-29 DIAGNOSIS — L84 CALLUS OF FOOT: ICD-10-CM

## 2021-04-29 DIAGNOSIS — I10 ESSENTIAL HYPERTENSION: ICD-10-CM

## 2021-04-29 DIAGNOSIS — E55.9 VITAMIN D DEFICIENCY: ICD-10-CM

## 2021-04-29 DIAGNOSIS — F02.80 LATE ONSET ALZHEIMER'S DISEASE WITHOUT BEHAVIORAL DISTURBANCE (HCC): Primary | ICD-10-CM

## 2021-04-29 DIAGNOSIS — G30.1 LATE ONSET ALZHEIMER'S DISEASE WITHOUT BEHAVIORAL DISTURBANCE (HCC): Primary | ICD-10-CM

## 2021-04-29 DIAGNOSIS — F33.1 MODERATE EPISODE OF RECURRENT MAJOR DEPRESSIVE DISORDER (HCC): ICD-10-CM

## 2021-04-29 LAB
BILIRUBIN URINE: NEGATIVE
BILIRUBIN, POC: NORMAL
BLOOD URINE, POC: NORMAL
BLOOD, URINE: NEGATIVE
CLARITY, POC: NORMAL
CLARITY: CLEAR
COLOR, POC: YELLOW
COLOR: YELLOW
COMMENT UA: NORMAL
EPITHELIAL CELLS, UA: 1 /HPF (ref 0–5)
GLUCOSE URINE, POC: NORMAL
GLUCOSE URINE: NEGATIVE MG/DL
HYALINE CASTS: 1 /LPF (ref 0–8)
KETONES, POC: NORMAL
KETONES, URINE: NEGATIVE MG/DL
LEUKOCYTE EST, POC: NORMAL
LEUKOCYTE ESTERASE, URINE: ABNORMAL
MICROSCOPIC EXAMINATION: YES
NITRITE, POC: NORMAL
NITRITE, URINE: NEGATIVE
PH UA: 5.5 (ref 5–8)
PH, POC: 5
PROTEIN UA: NEGATIVE MG/DL
PROTEIN, POC: NORMAL
RBC UA: 2 /HPF (ref 0–4)
SPECIFIC GRAVITY UA: 1.02 (ref 1–1.03)
SPECIFIC GRAVITY, POC: 1.03
URINE REFLEX TO CULTURE: ABNORMAL
URINE TYPE: ABNORMAL
UROBILINOGEN, POC: 0.2
UROBILINOGEN, URINE: 0.2 E.U./DL
WBC UA: 3 /HPF (ref 0–5)

## 2021-04-29 PROCEDURE — G8400 PT W/DXA NO RESULTS DOC: HCPCS | Performed by: INTERNAL MEDICINE

## 2021-04-29 PROCEDURE — 1123F ACP DISCUSS/DSCN MKR DOCD: CPT | Performed by: INTERNAL MEDICINE

## 2021-04-29 PROCEDURE — 1036F TOBACCO NON-USER: CPT | Performed by: INTERNAL MEDICINE

## 2021-04-29 PROCEDURE — G8420 CALC BMI NORM PARAMETERS: HCPCS | Performed by: INTERNAL MEDICINE

## 2021-04-29 PROCEDURE — 4040F PNEUMOC VAC/ADMIN/RCVD: CPT | Performed by: INTERNAL MEDICINE

## 2021-04-29 PROCEDURE — 1090F PRES/ABSN URINE INCON ASSESS: CPT | Performed by: INTERNAL MEDICINE

## 2021-04-29 PROCEDURE — 81002 URINALYSIS NONAUTO W/O SCOPE: CPT | Performed by: INTERNAL MEDICINE

## 2021-04-29 PROCEDURE — G8427 DOCREV CUR MEDS BY ELIG CLIN: HCPCS | Performed by: INTERNAL MEDICINE

## 2021-04-29 PROCEDURE — 99214 OFFICE O/P EST MOD 30 MIN: CPT | Performed by: INTERNAL MEDICINE

## 2021-04-29 RX ORDER — AMLODIPINE BESYLATE 5 MG/1
5 TABLET ORAL DAILY
Qty: 90 TABLET | Refills: 0 | Status: SHIPPED | OUTPATIENT
Start: 2021-04-29 | End: 2021-06-21

## 2021-04-29 RX ORDER — MULTIVIT-MIN/IRON/FOLIC ACID/K 18-600-40
1 CAPSULE ORAL DAILY
Qty: 90 CAPSULE | Refills: 0 | Status: SHIPPED | OUTPATIENT
Start: 2021-04-29 | End: 2021-09-29 | Stop reason: SDUPTHER

## 2021-04-29 RX ORDER — PRAVASTATIN SODIUM 80 MG/1
80 TABLET ORAL NIGHTLY
Qty: 90 TABLET | Refills: 0 | Status: SHIPPED | OUTPATIENT
Start: 2021-04-29 | End: 2021-09-29 | Stop reason: SDUPTHER

## 2021-04-29 RX ORDER — TELMISARTAN 40 MG/1
40 TABLET ORAL DAILY
Qty: 90 TABLET | Refills: 0 | Status: SHIPPED | OUTPATIENT
Start: 2021-04-29 | End: 2021-09-17

## 2021-04-29 RX ORDER — DONEPEZIL HYDROCHLORIDE 10 MG/1
10 TABLET, FILM COATED ORAL NIGHTLY
Qty: 90 TABLET | Refills: 0 | Status: SHIPPED | OUTPATIENT
Start: 2021-04-29 | End: 2021-07-26

## 2021-04-29 ASSESSMENT — PATIENT HEALTH QUESTIONNAIRE - PHQ9
SUM OF ALL RESPONSES TO PHQ9 QUESTIONS 1 & 2: 0
SUM OF ALL RESPONSES TO PHQ QUESTIONS 1-9: 0
SUM OF ALL RESPONSES TO PHQ QUESTIONS 1-9: 0

## 2021-04-29 NOTE — PROGRESS NOTES
SUBJECTIVE:  Guy Craig is a 81 y.o. female [address] [place]   Chief Complaint   Patient presents with    Hypertension    Other     PT HERE WITH SORE TOE LFT LITTLE TOE        PT HERE FOR EVAL  PT HERE WITH SON      DEMENTIA - TAKING MED - HELPING SOME. FORGETFULNESS IMPROVING A LITTLE PER SON. HTN - TAKING MEDS. BP NOTED. ? DIET / EXERCISE COMPLIANCE. Azalea Fothergill. HLP - TAKING  MED . + EXERCISE / DIET  COMPLIANCE .   ? MUSCLE CRAMPS / NO MUSCLE ACHES.   VIT D DEF -  ? MED COMPLIANCE. NOT AT GOAL - LABS D/W PT  DEPRESSION- TAKING MED.  NO INSOMNIA . DENIES SUICIDAL / NO HOMICIDAL THOUGHTS Kelly Kim  WITH PSYCH  THROMBOCYTOPENIA - CHRONIC.   [address] ALLERGIC RHINITIS -  NO RHINORRHEA, ? NASAL CONGESTION , NO  POSTNASAL DRAINAGE , NO SINUS PRESSURE, NO HA, NO SNEEZING, OCC WATERY ITCHY EYES. PT STATES CALLUS - LT 5TH TOES. OCC PAIN - NONE. STATES S/P PODIATRY EVAL  C/O CHANGE IN URINE COLOR      DENIES CP, No SOB, No PALPITATIONS, [facility] [address] [place] [address], NO F/C  No ABD PAIN, OC NAUSEA - NONE NOW, NO VOMITING, No DIARRHEA, No CONSTIPATION, No MELENA, No HEMATOCHEZIA. No DYSURIA, No FREQ, No URGENCY, No HEMATURIA      PMH: REVIEWED AND UPDATED TODAY    PSH: REVIEWED AND UPDATED TODAY    SOCIAL HX: REVIEWED AND UPDATED TODAY    FAMILY HX: REVIEWED AND UPDATED TODAY    ALLERGY:  Nabumetone    MEDS: REVIEWED  Prior to Visit Medications    Medication Sig Taking?  Authorizing Provider   amLODIPine (NORVASC) 5 MG tablet Take 1 tablet by mouth daily Yes Claudette Rily, MD   telmisartan (MICARDIS) 40 MG tablet Take 1 tablet by mouth daily Yes Claudette Rily, MD   Cholecalciferol (VITAMIN D) 50 MCG (2000 UT) CAPS capsule Take 1 capsule by mouth daily Yes Claudette Rily, MD   donepezil (ARICEPT) 5 MG tablet Take 1 tablet by mouth nightly Yes Claudette Rily, MD   pravastatin (PRAVACHOL) 80 MG tablet TAKE ONE TABLET BY MOUTH ONCE NIGHTLY Yes Claudette Rily, MD   fluticasone (FLONASE) 50 MCG/ACT nasal spray SPRAY TWO SPRAYS IN EACH NOSTRIL DAILY AS NEEDED FOR RHINITIS Yes Matt Vaughn MD   lidocaine (XYLOCAINE) 5 % ointment Apply topically TID / as needed. Yes Matt Vaughn MD   fexofenadine (ALLEGRA) 180 MG tablet Take 1 tablet by mouth daily as needed (AS NEEDED) Yes Matt Vaughn MD   escitalopram (LEXAPRO) 5 MG tablet Take 5 mg by mouth daily Yes Historical Provider, MD   acetaminophen (TYLENOL) 325 MG tablet Take 2 tablets by mouth every 4 hours as needed for Pain Yes Phu Tidwell MD   aspirin 81 MG tablet Take 81 mg by mouth daily Yes Historical Provider, MD   vitamin B-12 (CYANOCOBALAMIN) 500 MCG tablet Take 1 tablet by mouth daily  Matt Vaughn MD   Multiple Vitamins-Minerals (THERAPEUTIC MULTIVITAMIN-MINERALS) tablet Take 1 tablet by mouth daily  Matt Vaughn MD       ROS: COMPREHENSIVE ROS AS IN HX, REST -VE  History obtained from chart review and the patient    OBJECTIVE:   NURSING NOTE AND VITALS REVIEWED  /80 (Site: Right Upper Arm, Position: Sitting, Cuff Size: Medium Adult)   Pulse 63   Temp 97 °F (36.1 °C)   Ht 5' 6\" (1.676 m)   Wt 130 lb (59 kg)   SpO2 95%   Breastfeeding No   BMI 20.98 kg/m²     NO ACUTE DISTRESS    REPEAT BP: 130/78 (RT), NO ORTHOSTASIS     Body mass index is 20.98 kg/m². HEENT: NO PALLOR, ANICTERIC, PERRLA, EOMI, NO CONJUNCTIVAL ERYTHEMA,                 NO SINUS TENDERNESS  NECK:  SUPPLE, TRACHEA MIDLINE, NT, NO JVD, NO CB, NO LA, NO TM, NO STIFFNESS  CHEST: RESPY EFFORT NL, GOOD AE, NO W/R/C  HEART: S1S2+ REG, NO M/G/R  ABD: SOFT, NT, NO HSM, BS+  EXT: NO EDEMA, NT, PULSES +. TWAN'S -VE  NEURO: ALERT AND ORIENTED X 2 - CONFUSED WITH DATE, NO MENINGEAL SIGNS, NO TREMORS, NL GAIT, NO NEW FOCAL DEFICITS  PSYCH: FAIRLY GOOD AFFECT. RECALL 0/3. 2/3 WITH ASSISTANCE  BACK: NT, NO ROM, NO CVA TENDERNESS  FOOT: PT DEFERRED      PREVIOUS LABS / CT REVIEWED AND D/W PT   Impression       1.  Mild diffuse cerebral atrophy which is slightly more prominent in the bilateral temporal lobes. Mild small vessel ischemic disease,  slightly increased from previous examination on 9/29/2018. No intracranial hemorrhage, mass, or recent infarct    identified. UA: TRACE LEUKOCYTES    ASSESSMENT / PLAN:     Diagnosis Orders   1. Late onset Alzheimer's disease without behavioral disturbance ([facility]  COUNSELLED. INCREASE ARICEPT TO 10 MG QHS  D/W PT AND SON. - ADVISED ON SUPERVISED CARE. ADVISED ENCOURAGE INTERACTIVE ACTIVITY  MAKE CHANGES AS NEEDED. 2. Essential hypertension  COUNSELLED. CONTINUE MEDS. ADVISED LOW NA+ / DASH DIET/ EXERCISE. MONITOR. GOAL </= 120/80  MAKE CHANGES AS NEEDED. 3. Mixed hyperlipidemia  COUNSELLED. ADVISED LOW FAT / CHOL DIET/ EXERCISE.  MONITOR ON MED.  GOALS D/W PT.  MAKE CHANGES AS NEEDED. 4. Vitamin D deficiency  COUNSELLED. ADVISED MED COMPLIANCE - ADVISED VIT D 2000 U DAILY. MONITOR AND MAKE CHANGES AS NEEDED. 5. Moderate episode of recurrent major depressive disorder ([facility]  COUNSELLED. CONTINUE MED  PT COUNSELLED  ON RELAXATION TECHNIQUES. ADDRESSED STRESS MGT. MONITOR  PT NOT SUICIDAL/ NOT HOMICIDAL  F/U MH  MAKE CHANGES AS NEEDED. 6. Thrombocytopenia ([facility]  COUNSELLED. ASYMPTOMATIC. MONITOR  MAKE CHANGES AS NEEDED. 7. Other allergic rhinitis  COUNSELLED. MED PRN. MONITOR  MAKE CHANGES AS NEEDED. 8. Callus of foot - LT  COUNSELLED. DEFERRED PODIATRY EVAL. ADVISED ON FOOT CARE. MONITOR  MAKE CHANGES AS NEEDED. 9. Abnormal urine color / ABN UA  COUNSELLED. LAB TO EVAL  MAKE CHANGES AS NEEDED.                      MEDICATION SIDE EFFECTS D/W PATIENT    PT STABLE AT TIME OF D/C.    RETURN TO CLINIC WITHIN 3 MONTHS / PRN

## 2021-06-18 DIAGNOSIS — I10 ESSENTIAL HYPERTENSION: ICD-10-CM

## 2021-06-21 RX ORDER — AMLODIPINE BESYLATE 5 MG/1
TABLET ORAL
Qty: 90 TABLET | Refills: 0 | Status: SHIPPED | OUTPATIENT
Start: 2021-06-21 | End: 2021-08-20 | Stop reason: SDUPTHER

## 2021-07-26 DIAGNOSIS — G30.1 LATE ONSET ALZHEIMER'S DISEASE WITHOUT BEHAVIORAL DISTURBANCE (HCC): ICD-10-CM

## 2021-07-26 DIAGNOSIS — F02.80 LATE ONSET ALZHEIMER'S DISEASE WITHOUT BEHAVIORAL DISTURBANCE (HCC): ICD-10-CM

## 2021-07-26 RX ORDER — DONEPEZIL HYDROCHLORIDE 10 MG/1
TABLET, FILM COATED ORAL
Qty: 90 TABLET | Refills: 0 | Status: SHIPPED | OUTPATIENT
Start: 2021-07-26 | End: 2021-09-29 | Stop reason: SDUPTHER

## 2021-08-20 DIAGNOSIS — I10 ESSENTIAL HYPERTENSION: ICD-10-CM

## 2021-08-20 RX ORDER — AMLODIPINE BESYLATE 5 MG/1
TABLET ORAL
Qty: 90 TABLET | Refills: 0 | Status: SHIPPED | OUTPATIENT
Start: 2021-08-20 | End: 2021-09-29 | Stop reason: SDUPTHER

## 2021-09-16 DIAGNOSIS — I10 ESSENTIAL HYPERTENSION: ICD-10-CM

## 2021-09-17 RX ORDER — TELMISARTAN 40 MG/1
TABLET ORAL
Qty: 30 TABLET | Refills: 0 | Status: SHIPPED | OUTPATIENT
Start: 2021-09-17 | End: 2021-09-29 | Stop reason: SDUPTHER

## 2021-09-29 ENCOUNTER — OFFICE VISIT (OUTPATIENT)
Dept: INTERNAL MEDICINE CLINIC | Age: 86
End: 2021-09-29
Payer: MEDICARE

## 2021-09-29 VITALS
HEART RATE: 78 BPM | BODY MASS INDEX: 19.44 KG/M2 | SYSTOLIC BLOOD PRESSURE: 120 MMHG | WEIGHT: 121 LBS | DIASTOLIC BLOOD PRESSURE: 80 MMHG | HEIGHT: 66 IN | TEMPERATURE: 97.9 F | OXYGEN SATURATION: 98 %

## 2021-09-29 DIAGNOSIS — E55.9 VITAMIN D DEFICIENCY: ICD-10-CM

## 2021-09-29 DIAGNOSIS — D69.6 THROMBOCYTOPENIA (HCC): ICD-10-CM

## 2021-09-29 DIAGNOSIS — F33.1 MODERATE EPISODE OF RECURRENT MAJOR DEPRESSIVE DISORDER (HCC): ICD-10-CM

## 2021-09-29 DIAGNOSIS — M25.562 PAIN IN BOTH KNEES, UNSPECIFIED CHRONICITY: ICD-10-CM

## 2021-09-29 DIAGNOSIS — E78.2 MIXED HYPERLIPIDEMIA: ICD-10-CM

## 2021-09-29 DIAGNOSIS — I10 ESSENTIAL HYPERTENSION: Primary | ICD-10-CM

## 2021-09-29 DIAGNOSIS — Z23 NEED FOR IMMUNIZATION AGAINST INFLUENZA: ICD-10-CM

## 2021-09-29 DIAGNOSIS — J30.89 OTHER ALLERGIC RHINITIS: ICD-10-CM

## 2021-09-29 DIAGNOSIS — R35.0 URINARY FREQUENCY: ICD-10-CM

## 2021-09-29 DIAGNOSIS — F02.80 LATE ONSET ALZHEIMER'S DISEASE WITHOUT BEHAVIORAL DISTURBANCE (HCC): ICD-10-CM

## 2021-09-29 DIAGNOSIS — G30.1 LATE ONSET ALZHEIMER'S DISEASE WITHOUT BEHAVIORAL DISTURBANCE (HCC): ICD-10-CM

## 2021-09-29 DIAGNOSIS — M25.561 PAIN IN BOTH KNEES, UNSPECIFIED CHRONICITY: ICD-10-CM

## 2021-09-29 LAB
BILIRUBIN, POC: NORMAL
BLOOD URINE, POC: NORMAL
CLARITY, POC: NORMAL
COLOR, POC: YELLOW
GLUCOSE URINE, POC: NORMAL
KETONES, POC: NORMAL
LEUKOCYTE EST, POC: NORMAL
NITRITE, POC: NORMAL
PH, POC: 5.5
PROTEIN, POC: NORMAL
SPECIFIC GRAVITY, POC: 1.03
UROBILINOGEN, POC: 0.2

## 2021-09-29 PROCEDURE — 81002 URINALYSIS NONAUTO W/O SCOPE: CPT | Performed by: INTERNAL MEDICINE

## 2021-09-29 PROCEDURE — 1036F TOBACCO NON-USER: CPT | Performed by: INTERNAL MEDICINE

## 2021-09-29 PROCEDURE — 1123F ACP DISCUSS/DSCN MKR DOCD: CPT | Performed by: INTERNAL MEDICINE

## 2021-09-29 PROCEDURE — G0008 ADMIN INFLUENZA VIRUS VAC: HCPCS | Performed by: INTERNAL MEDICINE

## 2021-09-29 PROCEDURE — 99214 OFFICE O/P EST MOD 30 MIN: CPT | Performed by: INTERNAL MEDICINE

## 2021-09-29 PROCEDURE — 1090F PRES/ABSN URINE INCON ASSESS: CPT | Performed by: INTERNAL MEDICINE

## 2021-09-29 PROCEDURE — 4040F PNEUMOC VAC/ADMIN/RCVD: CPT | Performed by: INTERNAL MEDICINE

## 2021-09-29 PROCEDURE — 90694 VACC AIIV4 NO PRSRV 0.5ML IM: CPT | Performed by: INTERNAL MEDICINE

## 2021-09-29 PROCEDURE — G8420 CALC BMI NORM PARAMETERS: HCPCS | Performed by: INTERNAL MEDICINE

## 2021-09-29 PROCEDURE — G8427 DOCREV CUR MEDS BY ELIG CLIN: HCPCS | Performed by: INTERNAL MEDICINE

## 2021-09-29 PROCEDURE — G8400 PT W/DXA NO RESULTS DOC: HCPCS | Performed by: INTERNAL MEDICINE

## 2021-09-29 RX ORDER — DONEPEZIL HYDROCHLORIDE 10 MG/1
TABLET, FILM COATED ORAL
Qty: 90 TABLET | Refills: 0 | Status: SHIPPED | OUTPATIENT
Start: 2021-09-29 | End: 2022-01-03 | Stop reason: SDUPTHER

## 2021-09-29 RX ORDER — AMLODIPINE BESYLATE 5 MG/1
TABLET ORAL
Qty: 90 TABLET | Refills: 0 | Status: SHIPPED | OUTPATIENT
Start: 2021-09-29 | End: 2022-01-03 | Stop reason: SDUPTHER

## 2021-09-29 RX ORDER — PRAVASTATIN SODIUM 80 MG/1
80 TABLET ORAL NIGHTLY
Qty: 90 TABLET | Refills: 0 | Status: SHIPPED
Start: 2021-09-29 | End: 2022-01-03 | Stop reason: ALTCHOICE

## 2021-09-29 RX ORDER — TELMISARTAN 40 MG/1
TABLET ORAL
Qty: 90 TABLET | Refills: 0 | Status: SHIPPED | OUTPATIENT
Start: 2021-09-29 | End: 2022-01-03 | Stop reason: SDUPTHER

## 2021-09-29 RX ORDER — MULTIVIT-MIN/IRON/FOLIC ACID/K 18-600-40
1 CAPSULE ORAL DAILY
Qty: 90 CAPSULE | Refills: 0 | Status: SHIPPED | OUTPATIENT
Start: 2021-09-29 | End: 2022-01-03 | Stop reason: SDUPTHER

## 2021-09-29 RX ORDER — LIDOCAINE 50 MG/G
OINTMENT TOPICAL
Qty: 30 G | Refills: 0 | Status: SHIPPED
Start: 2021-09-29 | End: 2022-04-27 | Stop reason: CLARIF

## 2021-09-29 ASSESSMENT — PATIENT HEALTH QUESTIONNAIRE - PHQ9
SUM OF ALL RESPONSES TO PHQ9 QUESTIONS 1 & 2: 3
SUM OF ALL RESPONSES TO PHQ QUESTIONS 1-9: 7
8. MOVING OR SPEAKING SO SLOWLY THAT OTHER PEOPLE COULD HAVE NOTICED. OR THE OPPOSITE, BEING SO FIGETY OR RESTLESS THAT YOU HAVE BEEN MOVING AROUND A LOT MORE THAN USUAL: 0
SUM OF ALL RESPONSES TO PHQ QUESTIONS 1-9: 7
3. TROUBLE FALLING OR STAYING ASLEEP: 0
7. TROUBLE CONCENTRATING ON THINGS, SUCH AS READING THE NEWSPAPER OR WATCHING TELEVISION: 0
9. THOUGHTS THAT YOU WOULD BE BETTER OFF DEAD, OR OF HURTING YOURSELF: 0
4. FEELING TIRED OR HAVING LITTLE ENERGY: 3
5. POOR APPETITE OR OVEREATING: 1
6. FEELING BAD ABOUT YOURSELF - OR THAT YOU ARE A FAILURE OR HAVE LET YOURSELF OR YOUR FAMILY DOWN: 0
2. FEELING DOWN, DEPRESSED OR HOPELESS: 3
1. LITTLE INTEREST OR PLEASURE IN DOING THINGS: 0
SUM OF ALL RESPONSES TO PHQ QUESTIONS 1-9: 7
10. IF YOU CHECKED OFF ANY PROBLEMS, HOW DIFFICULT HAVE THESE PROBLEMS MADE IT FOR YOU TO DO YOUR WORK, TAKE CARE OF THINGS AT HOME, OR GET ALONG WITH OTHER PEOPLE: 0

## 2021-09-29 NOTE — PROGRESS NOTES
SUBJECTIVE:  Juan Song is a 81 y.o. female [address] [place]   Chief Complaint   Patient presents with    Hypertension    3 Month Follow-Up     needs refills on medications she stated se doesnt know ones        PT HERE FOR EVAL     HTN - TAKING MEDS. BP NOTED. ? DIET / EXERCISE COMPLIANCE. Sommer Escobedo. DEMENTIA - TAKING MED - TOLERATING. HLP - TAKING  MED . + EXERCISE / DIET  COMPLIANCE .   ? MUSCLE CRAMPS / NO MUSCLE ACHES.   VIT D DEF -  ? MED COMPLIANCE. NOT AT GOAL - LABS D/W PT  DEPRESSION - TAKING MED.  NO INSOMNIA . DENIES SUICIDAL / NO HOMICIDAL THOUGHTS Craige Brakeman  WITH PSYCH  ALLERGIC RHINITIS -  NO RHINORRHEA, ? NASAL CONGESTION , NO  POSTNASAL DRAINAGE , NO SINUS PRESSURE, NO HA, NO SNEEZING, OCC WATERY ITCHY EYES.  THROMBOCYTOPENIA - CHRONIC.   DENIES BRUISING. C/O URI. FREQ - ? DURATION. No DYSURIA,  No URGENCY, No HEMATURIA  C/O KNEE PAIN -  DELPHINE, ? DURATION. ? Yoly Pizano. ? PAIN SCALE. DENIES TRAUMA. ? NO SWELLING  NEEDS FLU VACCINE      DENIES CP, No SOB, No PALPITATIONS, No COUGH, NO F/C  No ABD PAIN, NO NAUSEA, NO VOMITING, No DIARRHEA, No CONSTIPATION, No MELENA, No HEMATOCHEZIA. PMH: REVIEWED AND UPDATED TODAY    PSH: REVIEWED AND UPDATED TODAY    SOCIAL HX: REVIEWED AND UPDATED TODAY    FAMILY HX: REVIEWED AND UPDATED TODAY    ALLERGY:  Nabumetone    MEDS: REVIEWED  Prior to Visit Medications    Medication Sig Taking?  Authorizing Provider   telmisartan (MICARDIS) 40 MG tablet TAKE ONE TABLET BY MOUTH DAILY Yes Claudette Grief, MD   amLODIPine (NORVASC) 5 MG tablet TAKE ONE TABLET BY MOUTH DAILY Yes Claudette Grief, MD   donepezil (ARICEPT) 10 MG tablet TAKE ONE TABLET BY MOUTH ONCE NIGHTLY Yes Claudette Grief, MD   Cholecalciferol (VITAMIN D) 50 MCG (2000 UT) CAPS capsule Take 1 capsule by mouth daily Yes Claudette Grief, MD   pravastatin (PRAVACHOL) 80 MG tablet Take 1 tablet by mouth nightly Yes Claudette Grief, MD   fluticasone (FLONASE) 50 MCG/ACT nasal spray SPRAY TWO SPRAYS IN EACH NOSTRIL DAILY AS NEEDED FOR RHINITIS Yes Amalia Boss MD   lidocaine (XYLOCAINE) 5 % ointment Apply topically TID / as needed. Yes Amalia Boss MD   fexofenadine (ALLEGRA) 180 MG tablet Take 1 tablet by mouth daily as needed (AS NEEDED) Yes Amalia Boss MD   escitalopram (LEXAPRO) 5 MG tablet Take 5 mg by mouth daily Yes Historical Provider, MD   acetaminophen (TYLENOL) 325 MG tablet Take 2 tablets by mouth every 4 hours as needed for Pain Yes Astird Andrade MD   aspirin 81 MG tablet Take 81 mg by mouth daily Yes Historical Provider, MD   vitamin B-12 (CYANOCOBALAMIN) 500 MCG tablet Take 1 tablet by mouth daily  Amalia Boss MD   Multiple Vitamins-Minerals (THERAPEUTIC MULTIVITAMIN-MINERALS) tablet Take 1 tablet by mouth daily  Amalia Boss MD       ROS: COMPREHENSIVE ROS AS IN HX, REST -VE  History obtained from chart review and the patient       OBJECTIVE:   NURSING NOTE AND VITALS REVIEWED  /70 (Site: Left Upper Arm, Position: Sitting, Cuff Size: Medium Adult)   Pulse 78   Temp 97.9 °F (36.6 °C)   Ht 5' 6\" (1.676 m)   Wt 121 lb (54.9 kg)   SpO2 98%   BMI 19.53 kg/m²     NO ACUTE DISTRESS    REPEAT BP: 120/80 (LT), NO ORTHOSTASIS     Body mass index is 19.53 kg/m². HEENT: NO PALLOR, ANICTERIC, PERRLA, EOMI, NO CONJUNCTIVAL ERYTHEMA,                 NO SINUS TENDERNESS  NECK:  SUPPLE, TRACHEA MIDLINE, NT, NO JVD, NO CB, NO LA, NO TM, NO STIFFNESS  CHEST: RESPY EFFORT NL, GOOD AE, NO W/R/C  HEART: S1S2+ REG, NO M/G/R  ABD: SOFT, NT, NO HSM, BS+  EXT: NO EDEMA, NT, PULSES +. TWAN'S -VE  NEURO: ALERT AND ORIENTED X 3, NO MENINGEAL SIGNS, NO TREMORS, AMBULATES WITH SLIGHT LIMP OTHERWISE, NO NEW FOCAL DEFICITS  PSYCH: FAIRLY GOOD AFFECT.  RECALL 1/3. 2/3 WIT H ASSISTANCE   BACK: NT, NO ROM, NO CVA TENDERNESS  KNEE: NO SWELLING, NT, NO ROM  SKIN: NO BRUISING NOTED     PREVIOUS LABS REVIEWED AND D/W PT    UA: NEGATIVE FOR UTI      ASSESSMENT / PLAN: Diagnosis Orders   1. Essential hypertension  COUNSELLED. STABLE. CONTINUE MEDS. LOW NA+ / DASH DIET/ EXERCISE. MONITOR. GOAL </= 120/80  F/U LABS  MAKE CHANGES AS NEEDED. 2. Late onset Alzheimer's disease without behavioral disturbance ([facility] 75.)  COUNSELLED. CONTINUE ARICEPT 10 MG  D/W PT - ADVISED ON SUPERVISED CARE. ADVISED ENCOURAGE INTERACTIVE ACTIVITY  MAKE CHANGES AS NEEDED. 3. Mixed hyperlipidemia  COUNSELLED. CONTINUE PRAVACHOL ADVISED LOW FAT / CHOL DIET/ EXERCISE.  MONITOR. F/U LABS  GOALS D/W PT.  MAKE CHANGES AS NEEDED. 4. Vitamin D deficiency  COUNSELLED. ADVISED MED COMPLIANCE - ADVISED VIT D 2000 U DAILY. MONITOR AND MAKE CHANGES AS NEEDED. 5. Moderate episode of recurrent major depressive disorder ([facility] 75.)  COUNSELLED. CONTINUE MED  PT COUNSELLED  ON RELAXATION TECHNIQUES. ADDRESSED STRESS MGT. MONITOR  PT NOT SUICIDAL/ NOT HOMICIDAL  F/U MH  MAKE CHANGES AS NEEDED. 6. Other allergic rhinitis  COUNSELLED. MED PRN. MONITOR  MAKE CHANGES AS NEEDED. 7. Thrombocytopenia ([facility] 75.)  COUNSELLED. CHRONIC. ASYMPTOMATIC. MONITOR  MAKE CHANGES AS NEEDED. 8. Urinary frequency  COUNSELLED. UA NEGATIVE FOR UTI  MONITOR FOR OAB  DEFERRED MED  ADVISED KEGEL'S EXERCISES  BLADDER TRAINING EXERCISES. MAKE CHANGES AS NEEDED. 9. Pain in both knees, unspecified chronicity  COUNSELLED. ? OA. EXERCISES. ANALGESICS PRN. MONITOR.   lidocaine (XYLOCAINE) 5 % ointment PRN  MONITOR LAB  MAKE CHANGES AS NEEDED. 10. Need for immunization against influenza  COUNSELLED. S/E D/W PT. HIGH DOSE FLU VACCINE GIVEN. PT TOLERATED.                        MEDICATION SIDE EFFECTS D/W PATIENT    RETURN TO CLINIC WITHIN 3 MONTHS / PRN    FOLLOW UP FOR FASTING LABS

## 2022-01-03 ENCOUNTER — OFFICE VISIT (OUTPATIENT)
Dept: INTERNAL MEDICINE CLINIC | Age: 87
End: 2022-01-03
Payer: MEDICARE

## 2022-01-03 ENCOUNTER — TELEPHONE (OUTPATIENT)
Dept: INTERNAL MEDICINE CLINIC | Age: 87
End: 2022-01-03

## 2022-01-03 VITALS
WEIGHT: 120 LBS | DIASTOLIC BLOOD PRESSURE: 80 MMHG | BODY MASS INDEX: 19.29 KG/M2 | HEART RATE: 80 BPM | HEIGHT: 66 IN | OXYGEN SATURATION: 98 % | SYSTOLIC BLOOD PRESSURE: 130 MMHG

## 2022-01-03 DIAGNOSIS — F02.80 LATE ONSET ALZHEIMER'S DISEASE WITHOUT BEHAVIORAL DISTURBANCE (HCC): ICD-10-CM

## 2022-01-03 DIAGNOSIS — L84 CALLUS: ICD-10-CM

## 2022-01-03 DIAGNOSIS — G30.1 LATE ONSET ALZHEIMER'S DISEASE WITHOUT BEHAVIORAL DISTURBANCE (HCC): ICD-10-CM

## 2022-01-03 DIAGNOSIS — E78.2 MIXED HYPERLIPIDEMIA: Primary | ICD-10-CM

## 2022-01-03 DIAGNOSIS — F33.1 MODERATE EPISODE OF RECURRENT MAJOR DEPRESSIVE DISORDER (HCC): ICD-10-CM

## 2022-01-03 DIAGNOSIS — I10 PRIMARY HYPERTENSION: ICD-10-CM

## 2022-01-03 DIAGNOSIS — E55.9 VITAMIN D DEFICIENCY: ICD-10-CM

## 2022-01-03 DIAGNOSIS — D69.6 THROMBOCYTOPENIA (HCC): ICD-10-CM

## 2022-01-03 DIAGNOSIS — M79.675 PAIN OF TOE OF LEFT FOOT: ICD-10-CM

## 2022-01-03 PROCEDURE — 4040F PNEUMOC VAC/ADMIN/RCVD: CPT | Performed by: INTERNAL MEDICINE

## 2022-01-03 PROCEDURE — 1090F PRES/ABSN URINE INCON ASSESS: CPT | Performed by: INTERNAL MEDICINE

## 2022-01-03 PROCEDURE — G8400 PT W/DXA NO RESULTS DOC: HCPCS | Performed by: INTERNAL MEDICINE

## 2022-01-03 PROCEDURE — G8427 DOCREV CUR MEDS BY ELIG CLIN: HCPCS | Performed by: INTERNAL MEDICINE

## 2022-01-03 PROCEDURE — 1036F TOBACCO NON-USER: CPT | Performed by: INTERNAL MEDICINE

## 2022-01-03 PROCEDURE — G8484 FLU IMMUNIZE NO ADMIN: HCPCS | Performed by: INTERNAL MEDICINE

## 2022-01-03 PROCEDURE — G8420 CALC BMI NORM PARAMETERS: HCPCS | Performed by: INTERNAL MEDICINE

## 2022-01-03 PROCEDURE — 99214 OFFICE O/P EST MOD 30 MIN: CPT | Performed by: INTERNAL MEDICINE

## 2022-01-03 PROCEDURE — 1123F ACP DISCUSS/DSCN MKR DOCD: CPT | Performed by: INTERNAL MEDICINE

## 2022-01-03 RX ORDER — MULTIVIT-MIN/IRON/FOLIC ACID/K 18-600-40
1 CAPSULE ORAL DAILY
Qty: 90 CAPSULE | Refills: 0 | Status: SHIPPED | OUTPATIENT
Start: 2022-01-03 | End: 2022-04-04 | Stop reason: SDUPTHER

## 2022-01-03 RX ORDER — TELMISARTAN 40 MG/1
TABLET ORAL
Qty: 90 TABLET | Refills: 0 | Status: SHIPPED | OUTPATIENT
Start: 2022-01-03 | End: 2022-03-01

## 2022-01-03 RX ORDER — AMLODIPINE BESYLATE 5 MG/1
TABLET ORAL
Qty: 90 TABLET | Refills: 0 | Status: SHIPPED | OUTPATIENT
Start: 2022-01-03 | End: 2022-03-23

## 2022-01-03 RX ORDER — ROSUVASTATIN CALCIUM 40 MG/1
40 TABLET, COATED ORAL NIGHTLY
Qty: 90 TABLET | Refills: 0 | Status: SHIPPED | OUTPATIENT
Start: 2022-01-03 | End: 2022-04-01

## 2022-01-03 RX ORDER — DONEPEZIL HYDROCHLORIDE 10 MG/1
TABLET, FILM COATED ORAL
Qty: 90 TABLET | Refills: 0 | Status: SHIPPED | OUTPATIENT
Start: 2022-01-03 | End: 2022-04-04 | Stop reason: SDUPTHER

## 2022-01-03 RX ORDER — M-VIT,TX,IRON,MINS/CALC/FOLIC 27MG-0.4MG
1 TABLET ORAL DAILY
Qty: 30 TABLET | Refills: 3 | Status: SHIPPED | OUTPATIENT
Start: 2022-01-03 | End: 2022-05-25 | Stop reason: SDUPTHER

## 2022-01-03 ASSESSMENT — PATIENT HEALTH QUESTIONNAIRE - PHQ9
1. LITTLE INTEREST OR PLEASURE IN DOING THINGS: 0
SUM OF ALL RESPONSES TO PHQ QUESTIONS 1-9: 0
SUM OF ALL RESPONSES TO PHQ9 QUESTIONS 1 & 2: 0
2. FEELING DOWN, DEPRESSED OR HOPELESS: 0
SUM OF ALL RESPONSES TO PHQ QUESTIONS 1-9: 0

## 2022-01-03 NOTE — PROGRESS NOTES
SUBJECTIVE:  Figueroa Bennett is a 81 y.o. female [address] [place]   Chief Complaint   Patient presents with    3 Month Follow-Up    Toe Pain     RIGHT FOOT BABY TOE        PT HERE FOR EVAL     [facility]. + EXERCISE / DIET  COMPLIANCE .   ? MUSCLE CRAMPS / NO MUSCLE ACHES. NOT AT GOAL - LAB D/W PT  HTN - ? MED COXMPLIANCE. BP NOTED. ? DIET / EXERCISE COMPLIANCE. NO HEADACHE , NO DIZZINESS. [address] . DENIES SUICIDAL / NO HOMICIDAL THOUGHTS Scar Old  WITH PSYCH  THROMBOCYTOPENIA - CHRONIC.   [address] VIT D DEF -  ? MED COMPLIANCE. NOT AT GOAL - LABS D/W PT  C/O TOE PAIN -  LT. PAST FEW DAYS. ? Gladysjulianna Lopez. ? PAIN SCALE. ? BUMPED TOE ON BED          DENIES CP, No SOB, No PALPITATIONS, No COUGH, NO F/C  No ABD PAIN, NO NAUSEA, NO VOMITING, No DIARRHEA, No CONSTIPATION, No MELENA, No HEMATOCHEZIA  No DYSURIA, No URI. Lois Rock      PMH: REVIEWED AND UPDATED TODAY    PSH: REVIEWED AND UPDATED TODAY    SOCIAL HX: REVIEWED AND UPDATED TODAY    FAMILY HX: REVIEWED AND UPDATED TODAY    ALLERGY:  Nabumetone    MEDS: REVIEWED  Prior to Visit Medications    Medication Sig Taking? Authorizing Provider   telmisartan (MICARDIS) 40 MG tablet TAKE ONE TABLET BY MOUTH DAILY Yes Lucio Cardenas MD   amLODIPine (NORVASC) 5 MG tablet TAKE ONE TABLET BY MOUTH DAILY Yes Lucio Cardenas MD   donepezil (ARICEPT) 10 MG tablet TAKE ONE TABLET BY MOUTH ONCE NIGHTLY Yes Lucio Cardenas MD   pravastatin (PRAVACHOL) 80 MG tablet Take 1 tablet by mouth nightly Yes Lucio Cardenas MD   Cholecalciferol (VITAMIN D) 50 MCG (2000 UT) CAPS capsule Take 1 capsule by mouth daily Yes Lucio Cardenas MD   lidocaine (XYLOCAINE) 5 % ointment Apply topically TID / as needed.  Yes Lucio Cardenas MD   fluticasone (FLONASE) 50 MCG/ACT nasal spray SPRAY TWO SPRAYS IN EACH NOSTRIL DAILY AS NEEDED FOR RHINITIS Yes Lucio Cardenas MD   fexofenadine (ALLEGRA) 180 MG tablet Take 1 tablet by mouth daily as needed (AS NEEDED) Yes Sameer Quiroz MD   escitalopram (LEXAPRO) 5 MG tablet Take 5 mg by mouth daily Yes Historical Provider, MD   acetaminophen (TYLENOL) 325 MG tablet Take 2 tablets by mouth every 4 hours as needed for Pain Yes Jess Cabezas MD   aspirin 81 MG tablet Take 81 mg by mouth daily Yes Historical Provider, MD   vitamin B-12 (CYANOCOBALAMIN) 500 MCG tablet Take 1 tablet by mouth daily  Sameer Quiroz MD   Multiple Vitamins-Minerals (THERAPEUTIC MULTIVITAMIN-MINERALS) tablet Take 1 tablet by mouth daily  Sameer Quiroz MD     ROS: COMPREHENSIVE ROS AS IN HX, REST -VE  History obtained from chart review and the patient       OBJECTIVE:   NURSING NOTE AND VITALS REVIEWED  BP (!) 150/82   Pulse 80   Ht 5' 6\" (1.676 m)   Wt 120 lb (54.4 kg)   SpO2 98%   BMI 19.37 kg/m²     NO ACUTE DISTRESS    REPEAT BP: 130/80 (RT), NO ORTHOSTASIS     Body mass index is 19.37 kg/m². HEENT: NO PALLOR, ANICTERIC, PERRLA, EOMI, NO CONJUNCTIVAL ERYTHEMA,                 NO SINUS TENDERNESS  NECK:  SUPPLE, TRACHEA MIDLINE, NT, NO JVD, NO CB, NO LA, NO TM, NO STIFFNESS  CHEST: RESPY EFFORT NL, GOOD AE, NO W/R/C  HEART: S1S2+ REG, NO M/G/R  ABD: SOFT, NT, NO HSM, BS+  EXT: NO EDEMA, NT, PULSES +. TWAN'S -VE  NEURO: ALERT AND ORIENTED X 2 - CONFUSED WITH DATE, NO MENINGEAL SIGNS, NO TREMORS, NL GAIT, NO NEW FOCAL DEFICITS  PSYCH: FAIRLY GOOD AFFECT. RECALL 3/3  BACK: NT, NO ROM, NO CVA TENDERNESS  FOOT: CALLUS LATERAL LT 5TH TOE OTHERWISE NO SWELLING, NT, NO ROM. NO ULCERS,               PULSES +, SENSATION INTACT AS TESTED  SKIN: NO BRUISING       PREVIOUS LABS REVIEWED AND D/W PT    ASSESSMENT / PLAN:     Diagnosis Orders   1. Mixed hyperlipidemia  COUNSELLED. CHANGE TO CRESTOR 40 MG QHS (D/C PRAVACHOL)  ADVISED LOW FAT / CHOL DIET/ EXERCISE.  MONITOR. F/U LABS  GOALS D/W PT.  MAKE CHANGES AS NEEDED. 2. Primary hypertension  COUNSELLED.  REPEAT BP AS ABOVE  CONTINUE MEDS. ADVISED LOW NA+ / DASH DIET/ EXERCISE. MONITOR. GOAL </= 120/80  F/U LABS  MAKE CHANGES AS NEEDED. 3. Late onset Alzheimer's disease without behavioral disturbance ([facility].)  COUNSELLED. CONTINUE ARICEPT  D/W PT  ADVISED ON SUPERVISED CARE. ADVISED ENCOURAGE INTERACTIVE ACTIVITY  MAKE CHANGES AS NEEDED. 4. Moderate episode of recurrent major depressive disorder ([facility] 75.)  COUNSELLED. CONTINUE MED  PT COUNSELLED  ON RELAXATION TECHNIQUES. ADDRESSED STRESS MGT. MONITOR  PT NOT SUICIDAL/ NOT HOMICIDAL  F/U MH  MAKE CHANGES AS NEEDED. 5. Thrombocytopenia ([facility] 75.)  COUNSELLED. ASYMPTOMATIC . MONITOR  MAKE CHANGES AS NEEDED. 6. Vitamin D deficiency  COUNSELLED. MONITOR ON VIT D SUPPLEMENT. MAKE CHANGES AS NEEDED. 7. Pain of toe of left foot  COUNSELLED. EXERCISES. ANALGESICS PRN. MONITOR. MAKE CHANGES AS NEEDED. 8. Callus - LT 5TH TOE  COUNSELLED. PT DEFERRED PODIATRY REFERRAL  ADVISED ON FOOT CARE. MONITOR  MAKE CHANGES AS NEEDED.                           MEDICATION SIDE EFFECTS D/W PATIENT    RETURN TO CLINIC WITHIN  3 MONTHS / PRN    FOLLOW UP FOR FASTING LABS

## 2022-03-01 DIAGNOSIS — I10 PRIMARY HYPERTENSION: ICD-10-CM

## 2022-03-01 RX ORDER — TELMISARTAN 40 MG/1
TABLET ORAL
Qty: 90 TABLET | Refills: 0 | Status: SHIPPED | OUTPATIENT
Start: 2022-03-01 | End: 2022-04-04 | Stop reason: SDUPTHER

## 2022-03-23 DIAGNOSIS — I10 PRIMARY HYPERTENSION: ICD-10-CM

## 2022-03-23 RX ORDER — AMLODIPINE BESYLATE 5 MG/1
TABLET ORAL
Qty: 90 TABLET | Refills: 0 | Status: SHIPPED | OUTPATIENT
Start: 2022-03-23 | End: 2022-04-04 | Stop reason: SDUPTHER

## 2022-04-01 DIAGNOSIS — E78.2 MIXED HYPERLIPIDEMIA: ICD-10-CM

## 2022-04-01 RX ORDER — ROSUVASTATIN CALCIUM 40 MG/1
TABLET, COATED ORAL
Qty: 90 TABLET | Refills: 0 | Status: SHIPPED | OUTPATIENT
Start: 2022-04-01 | End: 2022-05-25 | Stop reason: SDUPTHER

## 2022-04-04 ENCOUNTER — OFFICE VISIT (OUTPATIENT)
Dept: INTERNAL MEDICINE CLINIC | Age: 87
End: 2022-04-04
Payer: MEDICARE

## 2022-04-04 VITALS
TEMPERATURE: 97.1 F | HEIGHT: 66 IN | HEART RATE: 71 BPM | OXYGEN SATURATION: 99 % | SYSTOLIC BLOOD PRESSURE: 130 MMHG | WEIGHT: 119 LBS | DIASTOLIC BLOOD PRESSURE: 80 MMHG | BODY MASS INDEX: 19.13 KG/M2

## 2022-04-04 DIAGNOSIS — F02.80 LATE ONSET ALZHEIMER'S DISEASE WITHOUT BEHAVIORAL DISTURBANCE (HCC): ICD-10-CM

## 2022-04-04 DIAGNOSIS — R42 DIZZINESS: Primary | ICD-10-CM

## 2022-04-04 DIAGNOSIS — I10 PRIMARY HYPERTENSION: ICD-10-CM

## 2022-04-04 DIAGNOSIS — D69.6 THROMBOCYTOPENIA (HCC): ICD-10-CM

## 2022-04-04 DIAGNOSIS — E78.2 MIXED HYPERLIPIDEMIA: ICD-10-CM

## 2022-04-04 DIAGNOSIS — G30.1 LATE ONSET ALZHEIMER'S DISEASE WITHOUT BEHAVIORAL DISTURBANCE (HCC): ICD-10-CM

## 2022-04-04 DIAGNOSIS — E55.9 VITAMIN D DEFICIENCY: ICD-10-CM

## 2022-04-04 DIAGNOSIS — F33.1 MODERATE EPISODE OF RECURRENT MAJOR DEPRESSIVE DISORDER (HCC): ICD-10-CM

## 2022-04-04 PROCEDURE — 82962 GLUCOSE BLOOD TEST: CPT | Performed by: INTERNAL MEDICINE

## 2022-04-04 PROCEDURE — 1123F ACP DISCUSS/DSCN MKR DOCD: CPT | Performed by: INTERNAL MEDICINE

## 2022-04-04 PROCEDURE — G8420 CALC BMI NORM PARAMETERS: HCPCS | Performed by: INTERNAL MEDICINE

## 2022-04-04 PROCEDURE — G8400 PT W/DXA NO RESULTS DOC: HCPCS | Performed by: INTERNAL MEDICINE

## 2022-04-04 PROCEDURE — 4040F PNEUMOC VAC/ADMIN/RCVD: CPT | Performed by: INTERNAL MEDICINE

## 2022-04-04 PROCEDURE — G8427 DOCREV CUR MEDS BY ELIG CLIN: HCPCS | Performed by: INTERNAL MEDICINE

## 2022-04-04 PROCEDURE — 1036F TOBACCO NON-USER: CPT | Performed by: INTERNAL MEDICINE

## 2022-04-04 PROCEDURE — 99214 OFFICE O/P EST MOD 30 MIN: CPT | Performed by: INTERNAL MEDICINE

## 2022-04-04 PROCEDURE — 1090F PRES/ABSN URINE INCON ASSESS: CPT | Performed by: INTERNAL MEDICINE

## 2022-04-04 RX ORDER — MULTIVIT-MIN/IRON/FOLIC ACID/K 18-600-40
1 CAPSULE ORAL DAILY
Qty: 90 CAPSULE | Refills: 0 | Status: SHIPPED | OUTPATIENT
Start: 2022-04-04 | End: 2022-05-25 | Stop reason: SDUPTHER

## 2022-04-04 RX ORDER — AMLODIPINE BESYLATE 5 MG/1
5 TABLET ORAL DAILY
Qty: 90 TABLET | Refills: 0 | Status: SHIPPED | OUTPATIENT
Start: 2022-04-04 | End: 2022-05-25 | Stop reason: SDUPTHER

## 2022-04-04 RX ORDER — TELMISARTAN 40 MG/1
40 TABLET ORAL DAILY
Qty: 90 TABLET | Refills: 0 | Status: SHIPPED | OUTPATIENT
Start: 2022-04-04 | End: 2022-05-25 | Stop reason: SDUPTHER

## 2022-04-04 RX ORDER — DONEPEZIL HYDROCHLORIDE 10 MG/1
TABLET, FILM COATED ORAL
Qty: 90 TABLET | Refills: 0 | Status: SHIPPED | OUTPATIENT
Start: 2022-04-04 | End: 2022-05-25 | Stop reason: SDUPTHER

## 2022-04-04 SDOH — ECONOMIC STABILITY: FOOD INSECURITY: WITHIN THE PAST 12 MONTHS, YOU WORRIED THAT YOUR FOOD WOULD RUN OUT BEFORE YOU GOT MONEY TO BUY MORE.: NEVER TRUE

## 2022-04-04 SDOH — ECONOMIC STABILITY: FOOD INSECURITY: WITHIN THE PAST 12 MONTHS, THE FOOD YOU BOUGHT JUST DIDN'T LAST AND YOU DIDN'T HAVE MONEY TO GET MORE.: NEVER TRUE

## 2022-04-04 ASSESSMENT — PATIENT HEALTH QUESTIONNAIRE - PHQ9
8. MOVING OR SPEAKING SO SLOWLY THAT OTHER PEOPLE COULD HAVE NOTICED. OR THE OPPOSITE, BEING SO FIGETY OR RESTLESS THAT YOU HAVE BEEN MOVING AROUND A LOT MORE THAN USUAL: 0
SUM OF ALL RESPONSES TO PHQ QUESTIONS 1-9: 0
1. LITTLE INTEREST OR PLEASURE IN DOING THINGS: 0
1. LITTLE INTEREST OR PLEASURE IN DOING THINGS: 0
6. FEELING BAD ABOUT YOURSELF - OR THAT YOU ARE A FAILURE OR HAVE LET YOURSELF OR YOUR FAMILY DOWN: 0
SUM OF ALL RESPONSES TO PHQ QUESTIONS 1-9: 0
3. TROUBLE FALLING OR STAYING ASLEEP: 0
SUM OF ALL RESPONSES TO PHQ9 QUESTIONS 1 & 2: 0
7. TROUBLE CONCENTRATING ON THINGS, SUCH AS READING THE NEWSPAPER OR WATCHING TELEVISION: 0
SUM OF ALL RESPONSES TO PHQ QUESTIONS 1-9: 0
4. FEELING TIRED OR HAVING LITTLE ENERGY: 0
SUM OF ALL RESPONSES TO PHQ9 QUESTIONS 1 & 2: 0
2. FEELING DOWN, DEPRESSED OR HOPELESS: 0
10. IF YOU CHECKED OFF ANY PROBLEMS, HOW DIFFICULT HAVE THESE PROBLEMS MADE IT FOR YOU TO DO YOUR WORK, TAKE CARE OF THINGS AT HOME, OR GET ALONG WITH OTHER PEOPLE: 0
SUM OF ALL RESPONSES TO PHQ QUESTIONS 1-9: 0
5. POOR APPETITE OR OVEREATING: 0
SUM OF ALL RESPONSES TO PHQ QUESTIONS 1-9: 0
SUM OF ALL RESPONSES TO PHQ QUESTIONS 1-9: 0
9. THOUGHTS THAT YOU WOULD BE BETTER OFF DEAD, OR OF HURTING YOURSELF: 0
SUM OF ALL RESPONSES TO PHQ QUESTIONS 1-9: 0
2. FEELING DOWN, DEPRESSED OR HOPELESS: 0
SUM OF ALL RESPONSES TO PHQ QUESTIONS 1-9: 0

## 2022-04-04 ASSESSMENT — SOCIAL DETERMINANTS OF HEALTH (SDOH): HOW HARD IS IT FOR YOU TO PAY FOR THE VERY BASICS LIKE FOOD, HOUSING, MEDICAL CARE, AND HEATING?: NOT HARD AT ALL

## 2022-04-04 NOTE — PATIENT INSTRUCTIONS
TAKE MED AS ADVISED    DIET/ EXERCISE. FOLLOW UP WITHIN 3 MONTHS / AS NEEDED    FOLLOW UP FOR FASTING 5 [facility] Laboratory Locations - No appointment necessary. @ indicates the location is open Saturdays in addition to Monday through Friday. Call your preferred location for test preparation, business hours and other information you need. [employer] accepts [employer]. [facility]    @ [place] [facility] 3 [facility] [address] [place] [address]. [address]   Ph: [phone] [facility] Lab Svcs. [address]   Ph: [phone]  @ [facility] [address] [facility]   [facility]   Ph: [phone]    [facility] Lab S[facility]. [address]   Ph: [phone] @ [place] [facility] [address]  Ph: [phone] @ [facility] Lab Svcs. [address] [facility] [address]. [address] German Count includes the [facility]   Ph: [phone]    [place]   @ [facility] Lab Svcs. [address] [facility] Rodney Donnelly.[facility] [address]   Ph: [phone] [facility] Med. Office Bldg. [address] [facility], [address] [facility]  Ph: [address] [facility] 95, [phone]   [address] Lab Svcs[address] 54 [facility]   Ph: [phone] [facility]. Lab Svcs.   [address] [facility], [address] [place] [address]   Ph: [phone]

## 2022-04-04 NOTE — PROGRESS NOTES
SUBJECTIVE:  Mahad Ramirez is a 81 y.o. female [address] [place]   Chief Complaint   Patient presents with    Hyperlipidemia    Hypertension    Other     PT FEELS LIGHT HEADED BEACSUE THEY HAVEN'T EATEN        PT HERE FOR EVAL    C/O DIZZINESS - TODAY. + LIGHTHEADEDNESS, NO VERTIGO, NO TINNITUS, NO HEARING LOSS. STATES HAS NOT  HAD BREAKFAST TODAY  HTN - STATES TAKING MEDS. BP NOTED. ? DIET / EXERCISE COMPLIANCE. NO HEADACHE , + DIZZINESS. HLP - TAKING  MED . + EXERCISE / DIET  COMPLIANCE .   ? MUSCLE CRAMPS / NO MUSCLE ACHES. NOT AT GOAL - LAB D/W PT  DEMENTIA - TAKING MED - TOLERATING.    DEPRESSION - TAKING MED.  NO INSOMNIA . DENIES SUICIDAL / NO HOMICIDAL THOUGHTS Cedrick Spaulding  WITH PSYCH  THROMBOCYTOPENIA - CHRONIC.   DENIES BRUISING.   VIT D DEF -  ? MED COMPLIANCE. PREVIOUS LABS D/W PT  TOE PAIN -  RESOLVED         DENIES CP, No SOB, No PALPITATIONS, No COUGH, NO F/C  No ABD PAIN, NO NAUSEA, NO VOMITING, No DIARRHEA, No CONSTIPATION, No MELENA, No HEMATOCHEZIA  No DYSURIA, No URI. Nery Blood    PMH: REVIEWED AND UPDATED TODAY    PSH: REVIEWED AND UPDATED TODAY    SOCIAL HX: REVIEWED AND UPDATED TODAY    FAMILY HX: REVIEWED AND UPDATED TODAY    ALLERGY:  Nabumetone    MEDS: REVIEWED  Prior to Visit Medications    Medication Sig Taking?  Authorizing Provider   rosuvastatin (CRESTOR) 40 MG tablet TAKE ONE TABLET BY MOUTH ONCE NIGHTLY Yes Annette Horowitz MD   amLODIPine (NORVASC) 5 MG tablet TAKE ONE TABLET BY MOUTH DAILY Yes Annette Horowitz MD   telmisartan (MICARDIS) 40 MG tablet TAKE ONE TABLET BY MOUTH DAILY Yes Annette Horowitz MD   donepezil (ARICEPT) 10 MG tablet TAKE ONE TABLET BY MOUTH ONCE NIGHTLY Yes Annette Horowitz MD   Cholecalciferol (VITAMIN D) 50 MCG (2000 UT) CAPS capsule Take 1 capsule by mouth daily Yes Annette Horowitz MD   Multiple Vitamins-Minerals (THERAPEUTIC MULTIVITAMIN-MINERALS) tablet Take 1 tablet by mouth daily Yes Annette Horowitz MD   lidocaine (XYLOCAINE) 5 % ointment Apply topically TID / as needed. Yes James Colón MD   fluticasone (FLONASE) 50 MCG/ACT nasal spray SPRAY TWO SPRAYS IN EACH NOSTRIL DAILY AS NEEDED FOR RHINITIS Yes James Colón MD   fexofenadine (ALLEGRA) 180 MG tablet Take 1 tablet by mouth daily as needed (AS NEEDED) Yes James Colón MD   escitalopram (LEXAPRO) 5 MG tablet Take 5 mg by mouth daily Yes Historical Provider, MD   acetaminophen (TYLENOL) 325 MG tablet Take 2 tablets by mouth every 4 hours as needed for Pain Yes Kenzie Patel MD   aspirin 81 MG tablet Take 81 mg by mouth daily Yes Historical Provider, MD   vitamin B-12 (CYANOCOBALAMIN) 500 MCG tablet Take 1 tablet by mouth daily  James Colón MD       ROS: COMPREHENSIVE ROS AS IN HX, REST -VE  History obtained from chart review and the patient       OBJECTIVE:   NURSING NOTE AND VITALS REVIEWED  /86 (Site: Right Upper Arm, Position: Sitting, Cuff Size: Large Adult)   Pulse 71   Temp 97.1 °F (36.2 °C)   Ht 5' 6\" (1.676 m)   Wt 119 lb (54 kg)   SpO2 99%   Breastfeeding No   BMI 19.21 kg/m²     NO ACUTE DISTRESS    REPEAT BP: 130/80 (LT), NO ORTHOSTASIS     Body mass index is 19.21 kg/m². HEENT: NO PALLOR, ANICTERIC, PERRLA, EOMI, NO CONJUNCTIVAL ERYTHEMA,                 NO SINUS TENDERNESS  NECK:  SUPPLE, TRACHEA MIDLINE, NT, NO JVD, NO CB, NO LA, NO TM, NO STIFFNESS  CHEST: RESPY EFFORT NL, GOOD AE, NO W/R/C  HEART: S1S2+ REG, NO M/G/R  ABD: SOFT, NT, NO HSM, BS+  EXT: NO EDEMA, NT, PULSES +. TWAN'S -VE  NEURO: ALERT AND ORIENTED X 2 - CONFUSED WITH DATE, NO MENINGEAL SIGNS, NO TREMORS, NL GAIT, NO FOCAL DEFICITS  PSYCH: FAIRLY GOOD AFFECT, RECALL 2/3  BACK: NT, NO ROM, NO CVA TENDERNESS     PREVIOUS LABS REVIEWED AND D/W PT / LAST LABS PENDING    ACCUCHECK: 106    ASSESSMENT / PLAN:     Diagnosis Orders   1. Dizziness / Lightheadedness  COUNSELLED. ADVISED GRADUAL POSITION CHANGE. ADVISED ON DIET/ MEALS  MAINTAIN HYDRATION.   DEFERRED MECLIZINE  MONITOR LABS. MAKE CHANGES AS NEEDED. 2. Primary hypertension  COUNSELLED. CONTINUE MEDS. LOW NA+ / DASH DIET/ EXERCISE. MONITOR. GOAL </= 130/80  F/U LABS  MAKE CHANGES AS NEEDED. 3. Mixed hyperlipidemia  COUNSELLED. ADVISED LOW FAT / CHOL DIET/ EXERCISE.  MONITOR ON MED. F/U LABS  GOALS D/W PT.  MAKE CHANGES AS NEEDED. 4. Late onset Alzheimer's disease without behavioral disturbance ([facility].)  COUNSELLED. ARICEPT 10 MG REFILLED  D/W PT  - ADVISED ON SUPERVISED CARE. ADVISED ENCOURAGE INTERACTIVE ACTIVITY  MAKE CHANGES AS NEEDED. 5. Moderate episode of recurrent major depressive disorder ([facility].)  COUNSELLED. CONTINUE MED  PT COUNSELLED  ON RELAXATION TECHNIQUES. ADDRESSED STRESS MGT. MONITOR  PT NOT SUICIDAL/ NOT HOMICIDAL  F/U MH  MAKE CHANGES AS NEEDED. 6. Thrombocytopenia ([facility].)  COUNSELLED. ASYMPTOMATIC. MONITOR  MAKE CHANGES AS NEEDED. 7. Vitamin D deficiency  COUNSELLED. MONITOR ON VIT D SUPPLEMENT. MAKE CHANGES AS NEEDED. MEDICATION SIDE EFFECTS D/W PATIENT    RETURN TO CLINIC WITHIN 3 MONTHS. / PRN    FOLLOW UP FOR FASTING LABS

## 2022-04-05 LAB
CHP ED QC CHECK: NORMAL
FOLATE: 18.18 NG/ML (ref 4.78–24.2)
GLUCOSE BLD-MCNC: 106 MG/DL
TSH REFLEX: 1.01 UIU/ML (ref 0.27–4.2)
VITAMIN B-12: 573 PG/ML (ref 211–911)

## 2022-04-26 ENCOUNTER — HOSPITAL ENCOUNTER (EMERGENCY)
Age: 87
Discharge: HOME | End: 2022-04-27
Attending: STUDENT IN AN ORGANIZED HEALTH CARE EDUCATION/TRAINING PROGRAM
Payer: MEDICARE

## 2022-04-26 ENCOUNTER — APPOINTMENT (OUTPATIENT)
Dept: CT IMAGING | Age: 87
End: 2022-04-26
Payer: MEDICARE

## 2022-04-26 ENCOUNTER — APPOINTMENT (OUTPATIENT)
Dept: GENERAL RADIOLOGY | Age: 87
End: 2022-04-26
Payer: MEDICARE

## 2022-04-26 DIAGNOSIS — R42 LIGHTHEADEDNESS: Primary | ICD-10-CM

## 2022-04-26 LAB
ALBUMIN SERPL-MCNC: 4.5 G/DL (ref 3.4–5)
ALP BLD-CCNC: 75 U/L (ref 40–129)
ALT SERPL-CCNC: 12 U/L (ref 10–40)
AMORPHOUS: ABNORMAL /HPF
ANION GAP SERPL CALCULATED.3IONS-SCNC: 13 MMOL/L (ref 3–16)
AST SERPL-CCNC: 20 U/L (ref 15–37)
BASE EXCESS VENOUS: 6.3 MMOL/L (ref -2–3)
BASOPHILS ABSOLUTE: 0.1 K/UL (ref 0–0.2)
BASOPHILS RELATIVE PERCENT: 2 %
BILIRUB SERPL-MCNC: 1.1 MG/DL (ref 0–1)
BILIRUBIN DIRECT: <0.2 MG/DL (ref 0–0.3)
BILIRUBIN URINE: NEGATIVE
BILIRUBIN, INDIRECT: ABNORMAL MG/DL (ref 0–1)
BLOOD, URINE: NEGATIVE
BUN BLDV-MCNC: 9 MG/DL (ref 7–20)
CALCIUM SERPL-MCNC: 9.9 MG/DL (ref 8.3–10.6)
CARBOXYHEMOGLOBIN: 1.2 % (ref 0–1.5)
CHLORIDE BLD-SCNC: 106 MMOL/L (ref 99–110)
CLARITY: CLEAR
CO2: 27 MMOL/L (ref 21–32)
COLOR: YELLOW
CREAT SERPL-MCNC: 1 MG/DL (ref 0.6–1.2)
EKG ATRIAL RATE: 64 BPM
EKG DIAGNOSIS: NORMAL
EKG P AXIS: 51 DEGREES
EKG P-R INTERVAL: 202 MS
EKG Q-T INTERVAL: 428 MS
EKG QRS DURATION: 104 MS
EKG QTC CALCULATION (BAZETT): 441 MS
EKG R AXIS: -50 DEGREES
EKG T AXIS: 25 DEGREES
EKG VENTRICULAR RATE: 64 BPM
EOSINOPHILS ABSOLUTE: 0 K/UL (ref 0–0.6)
EOSINOPHILS RELATIVE PERCENT: 0 %
EPITHELIAL CELLS, UA: ABNORMAL /HPF (ref 0–5)
GFR AFRICAN AMERICAN: >60
GFR NON-AFRICAN AMERICAN: 53
GLUCOSE BLD-MCNC: 97 MG/DL (ref 70–99)
GLUCOSE URINE: NEGATIVE MG/DL
HCO3 VENOUS: 32.6 MMOL/L (ref 24–28)
HCT VFR BLD CALC: 39.7 % (ref 36–48)
HEMOGLOBIN, VEN, REDUCED: 65.1 %
HEMOGLOBIN: 12.7 G/DL (ref 12–16)
KETONES, URINE: NEGATIVE MG/DL
LACTIC ACID: 1.2 MMOL/L (ref 0.4–2)
LEUKOCYTE ESTERASE, URINE: NEGATIVE
LIPASE: 16 U/L (ref 13–60)
LYMPHOCYTES ABSOLUTE: 1.4 K/UL (ref 1–5.1)
LYMPHOCYTES RELATIVE PERCENT: 26 %
MAGNESIUM: 2.1 MG/DL (ref 1.8–2.4)
MCH RBC QN AUTO: 26.4 PG (ref 26–34)
MCHC RBC AUTO-ENTMCNC: 31.9 G/DL (ref 31–36)
MCV RBC AUTO: 82.6 FL (ref 80–100)
METHEMOGLOBIN VENOUS: 0.4 % (ref 0–1.5)
MICROSCOPIC EXAMINATION: YES
MONOCYTES ABSOLUTE: 0.3 K/UL (ref 0–1.3)
MONOCYTES RELATIVE PERCENT: 5 %
NEUTROPHILS ABSOLUTE: 3.5 K/UL (ref 1.7–7.7)
NEUTROPHILS RELATIVE PERCENT: 67 %
NITRITE, URINE: NEGATIVE
O2 SAT, VEN: 34 %
PCO2, VEN: 53.5 MMHG (ref 41–51)
PDW BLD-RTO: 15.3 % (ref 12.4–15.4)
PH UA: 6 (ref 5–8)
PH VENOUS: 7.39 (ref 7.35–7.45)
PLATELET # BLD: 119 K/UL (ref 135–450)
PLATELET SLIDE REVIEW: ABNORMAL
PMV BLD AUTO: 10.1 FL (ref 5–10.5)
PO2, VEN: <30 MMHG (ref 25–40)
POTASSIUM REFLEX MAGNESIUM: 3 MMOL/L (ref 3.5–5.1)
PRO-BNP: 705 PG/ML (ref 0–449)
PROTEIN UA: ABNORMAL MG/DL
RBC # BLD: 4.8 M/UL (ref 4–5.2)
RBC # BLD: NORMAL 10*6/UL
RBC UA: ABNORMAL /HPF (ref 0–4)
SLIDE REVIEW: ABNORMAL
SODIUM BLD-SCNC: 146 MMOL/L (ref 136–145)
SPECIFIC GRAVITY UA: 1.02 (ref 1–1.03)
TCO2 CALC VENOUS: 34 MMOL/L
TOTAL PROTEIN: 6.9 G/DL (ref 6.4–8.2)
TROPONIN: <0.01 NG/ML
URINE TYPE: ABNORMAL
UROBILINOGEN, URINE: 0.2 E.U./DL
WBC # BLD: 5.2 K/UL (ref 4–11)
WBC UA: ABNORMAL /HPF (ref 0–5)

## 2022-04-26 PROCEDURE — 36415 COLL VENOUS BLD VENIPUNCTURE: CPT

## 2022-04-26 PROCEDURE — 74177 CT ABD & PELVIS W/CONTRAST: CPT

## 2022-04-26 PROCEDURE — 84484 ASSAY OF TROPONIN QUANT: CPT

## 2022-04-26 PROCEDURE — 85025 COMPLETE CBC W/AUTO DIFF WBC: CPT

## 2022-04-26 PROCEDURE — 82803 BLOOD GASES ANY COMBINATION: CPT

## 2022-04-26 PROCEDURE — 93005 ELECTROCARDIOGRAM TRACING: CPT | Performed by: STUDENT IN AN ORGANIZED HEALTH CARE EDUCATION/TRAINING PROGRAM

## 2022-04-26 PROCEDURE — 2580000003 HC RX 258: Performed by: STUDENT IN AN ORGANIZED HEALTH CARE EDUCATION/TRAINING PROGRAM

## 2022-04-26 PROCEDURE — 83605 ASSAY OF LACTIC ACID: CPT

## 2022-04-26 PROCEDURE — 80048 BASIC METABOLIC PNL TOTAL CA: CPT

## 2022-04-26 PROCEDURE — 83880 ASSAY OF NATRIURETIC PEPTIDE: CPT

## 2022-04-26 PROCEDURE — 83735 ASSAY OF MAGNESIUM: CPT

## 2022-04-26 PROCEDURE — 83690 ASSAY OF LIPASE: CPT

## 2022-04-26 PROCEDURE — 99285 EMERGENCY DEPT VISIT HI MDM: CPT

## 2022-04-26 PROCEDURE — 6360000004 HC RX CONTRAST MEDICATION: Performed by: STUDENT IN AN ORGANIZED HEALTH CARE EDUCATION/TRAINING PROGRAM

## 2022-04-26 PROCEDURE — 80076 HEPATIC FUNCTION PANEL: CPT

## 2022-04-26 PROCEDURE — 6370000000 HC RX 637 (ALT 250 FOR IP): Performed by: STUDENT IN AN ORGANIZED HEALTH CARE EDUCATION/TRAINING PROGRAM

## 2022-04-26 PROCEDURE — 81001 URINALYSIS AUTO W/SCOPE: CPT

## 2022-04-26 PROCEDURE — 70450 CT HEAD/BRAIN W/O DYE: CPT

## 2022-04-26 PROCEDURE — 71045 X-RAY EXAM CHEST 1 VIEW: CPT

## 2022-04-26 RX ORDER — POTASSIUM CHLORIDE 20 MEQ/1
40 TABLET, EXTENDED RELEASE ORAL ONCE
Status: COMPLETED | OUTPATIENT
Start: 2022-04-26 | End: 2022-04-26

## 2022-04-26 RX ORDER — SODIUM CHLORIDE, SODIUM LACTATE, POTASSIUM CHLORIDE, AND CALCIUM CHLORIDE .6; .31; .03; .02 G/100ML; G/100ML; G/100ML; G/100ML
500 INJECTION, SOLUTION INTRAVENOUS ONCE
Status: COMPLETED | OUTPATIENT
Start: 2022-04-26 | End: 2022-04-26

## 2022-04-26 RX ADMIN — SODIUM CHLORIDE, POTASSIUM CHLORIDE, SODIUM LACTATE AND CALCIUM CHLORIDE 500 ML: 600; 310; 30; 20 INJECTION, SOLUTION INTRAVENOUS at 17:29

## 2022-04-26 RX ADMIN — POTASSIUM CHLORIDE 40 MEQ: 20 TABLET, EXTENDED RELEASE ORAL at 17:31

## 2022-04-26 RX ADMIN — IOPAMIDOL 80 ML: 755 INJECTION, SOLUTION INTRAVENOUS at 15:57

## 2022-04-26 ASSESSMENT — PAIN - FUNCTIONAL ASSESSMENT: PAIN_FUNCTIONAL_ASSESSMENT: NONE - DENIES PAIN

## 2022-04-26 NOTE — ED PROVIDER NOTES
[address] [facility]          ATTENDING PHYSICIAN NOTE       Date of evaluation: 4/26/2022    Chief Complaint     Dizziness      History of Present Illness     Mahad Ramirez is a 81 y.o. female who presents with dizziness    Patient presents with lightheadedness. There is some associated nausea and epigastric discomfort. The epigastric discomfort is mild in severity, aching in quality, and intermittent in course. Both the nausea and lightheadedness have been present since yesterday, or maybe the day before. There are no vertiginous descriptions of symptoms. She denies any visual changes, numbness, weakness. She denies any difficulty with walking. She has not had any recent falls or head strikes. Review of the chart shows that she was seen earlier this month for lightheadedness which was thought to be due to not eating breakfast and possible effect of medicines. Her son arrives and expresses concern that her dementia is getting contributing to failure to eat. He notes that her fridge was still full today. Patient does volunteer that she has been eating poorly for the last 2 or 3 days. She denies any emesis. She denies any fear of eating or significant pain or worsening with eating. She just says that she does not have the energy and lives alone. PMHx: Dementia for her primary care note, hyperlipidemia, hypertension, and as below  SH: Non-smoker, no alcohol, and as below    Review of Systems       ROS:   Positive  as per HPI.   Negative for:    -Constitutional: fevers, chills    -Eyes:   eye pain, eye discharge    -Ears/Nose/Throat: Ear pain, ear discharge    -Cardiovascular: CP, cyanosis    -Respiratory:  cough, SOB    -Gastrointestinal: Abd pain, nausea, vomiting, melena, hematochezia    -Genitourinary: hematuria, dysuria, urinary frequency    -Neurological: numbness or weakness    -Skin:   Rash, pruritis,     -Hematologic: easy bleeding, easy bruising -Musculoskeletal:  joint swelling, joint redness    Past Medical, Surgical, Family, and Social History     She has a past medical history of Allergic rhinitis, Depression, DJD (degenerative joint disease), Hyperlipidemia, Hypertension, and Thrombocytopenia ([address] She has a past surgical history that includes Breast lumpectomy; Hemorrhoid surgery; Hysterectomy; and Cataract removal with implant. Her family history includes Cancer in her maternal grandmother; Diabetes in her paternal aunt; Heart Disease in her brother; High Blood Pressure in her brother, mother, sister, and another family member; Stroke in her paternal uncle. She reports that she has never smoked. She has never used smokeless tobacco. She reports that she does not drink alcohol and does not use drugs. Medications     Previous Medications    ACETAMINOPHEN (TYLENOL) 325 MG TABLET    Take 2 tablets by mouth every 4 hours as needed for Pain    AMLODIPINE (NORVASC) 5 MG TABLET    Take 1 tablet by mouth daily    ASPIRIN 81 MG TABLET    Take 81 mg by mouth daily    CHOLECALCIFEROL (VITAMIN D) 50 MCG (2000 UT) CAPS CAPSULE    Take 1 capsule by mouth daily    DONEPEZIL (ARICEPT) 10 MG TABLET    TAKE ONE TABLET BY MOUTH ONCE NIGHTLY    ESCITALOPRAM (LEXAPRO) 5 MG TABLET    Take 5 mg by mouth daily    FEXOFENADINE (ALLEGRA) 180 MG TABLET    Take 1 tablet by mouth daily as needed (AS NEEDED)    FLUTICASONE (FLONASE) 50 MCG/ACT NASAL SPRAY    SPRAY TWO SPRAYS IN EACH NOSTRIL DAILY AS NEEDED FOR RHINITIS    LIDOCAINE (XYLOCAINE) 5 % OINTMENT    Apply topically TID / as needed.     MULTIPLE VITAMINS-MINERALS (THERAPEUTIC MULTIVITAMIN-MINERALS) TABLET    Take 1 tablet by mouth daily    ROSUVASTATIN (CRESTOR) 40 MG TABLET    TAKE ONE TABLET BY MOUTH ONCE NIGHTLY    TELMISARTAN (MICARDIS) 40 MG TABLET    Take 1 tablet by mouth daily    VITAMIN B-12 (CYANOCOBALAMIN) 500 MCG TABLET    Take 1 tablet by mouth daily       Allergies     She is allergic to nabumetone. Physical Exam     INITIAL VITALS: BP: (!) 167/90, Temp: 98.2 °F (36.8 °C), Pulse: 69, Resp: 15, SpO2: 100 %     General:  Well appearing. No acute distress. Non-toxic appearing    Eyes:  Pupils equally round, reactive, brisk. No discharge from eyes. ENT:  No discharge from nose. OP clear. Neck:  Supple. Nontender. Pulmonary:   Non-labored breathing. Breath sounds clear bilaterally. Cardiac:  Regular rate and rhythm. No murmurs. Abdomen:  Soft. Non-tender throughout including epigastrium. No rebound or guarding. Non-distended. No masses. Musculoskeletal:  No long bone deformity. No ankle or wrist deformity. Vascular:  Extremities warm and perfused. Radial pulses 2+ bilaterally. Dorsalis pedis pulses 2+ bilaterally. Skin:  No rash. Warm. Neuro:  Mental Status: GCS 15. AAOx4. Speech is clear and fluent, with no aphasia or dysarthria. CN: Visual fields are full to confrontation. Pupils are 4->2 mm bilaterally, round and briskly reactive to light. EOMI with no nystagmus. Facial sensation is intact in all 3 divisions bilaterally. Face is symmetric with normal eye closure and smile. Hearing is grossly intact bilaterally. Palate elevates symmetrically. Phonation is normal. Shoulder shrug is intact bilaterally. Tongue is midline with normal movement and no atrophy. Motor:   Shoulder Abduction Arm Flexion Arm Extension Wrist Extension Finger Abduction    Left 5 5 5 5 5 5   Right 5 5 5 5 5 5    Hip Flexion Hip Extension Knee Flexion Knee Extension Plantar Flexion Dorsiflexion   Left 5 5 5 5 5 5   Right 5 5 5 5 5 5   Sensation: Intact to fine touch in bilateral upper and lower extremities. Coordination: Finger-to-nose normal bilaterally. Gait/Balance/Proprioception: Gait narrow based but without ataxia. Extremities:  No peripheral edema. LE symmetric.     Diagnostic Results     EKG   Indication dizziness     EKG Interpretation     Interpreted by me (emergency department physician)     Rhythm: normal sinus   Rate: 64  Axis: left (-50)  Ectopy: none  Conduction: nonspecific IVCD , first degree AV block , normal QTc 441  ST Segments: no acute change  T Waves: no acute change  Q Waves: nonspecific pattern     Clinical Impression:   Normal sinus rhythm. This is a non-specific EKG with no significant change compared to the prior EKG dated 9/29/18. There is no evidence of significant new interval prolongation, although prior QRS was only 104 this is unlikely to be significant  Stable left axis. There is no evidence of acute ischemia. RABIA FELIX      RADIOLOGY:  CT ABDOMEN PELVIS W IV CONTRAST Additional Contrast? None   Final Result      No cause for acute pain identified. 2.0 cm AAA. Gallstone. CT Head WO Contrast   Final Result      Age-related changes in the brain. No acute intracranial abnormality. XR CHEST PORTABLE   Final Result      1. No acute disease.                 LABS:   Results for orders placed or performed during the hospital encounter of 04/26/22   CBC with Auto Differential   Result Value Ref Range    WBC 5.2 4.0 - 11.0 K/uL    RBC 4.80 4.00 - 5.20 M/uL    Hemoglobin 12.7 12.0 - 16.0 g/dL    Hematocrit 39.7 36.0 - 48.0 %    MCV 82.6 80.0 - 100.0 fL    MCH 26.4 26.0 - 34.0 pg    MCHC 31.9 31.0 - 36.0 g/dL    RDW 15.3 12.4 - 15.4 %    Platelets 719 (L) 952 - 450 K/uL    MPV 10.1 5.0 - 10.5 fL    PLATELET SLIDE REVIEW Decreased     SLIDE REVIEW see below     Neutrophils % 67.0 %    Lymphocytes % 26.0 %    Monocytes % 5.0 %    Eosinophils % 0.0 %    Basophils % 2.0 %    Neutrophils Absolute 3.5 1.7 - 7.7 K/uL    Lymphocytes Absolute 1.4 1.0 - 5.1 K/uL    Monocytes Absolute 0.3 0.0 - 1.3 K/uL    Eosinophils Absolute 0.0 0.0 - 0.6 K/uL    Basophils Absolute 0.1 0.0 - 0.2 K/uL    RBC Morphology Normal    Basic Metabolic Panel w/ Reflex to MG   Result Value Ref Range    Sodium 146 (H) 136 - 145 mmol/L    Potassium reflex Magnesium 3.0 (L) 3.5 - 5.1 mmol/L    Chloride 106 99 - 110 mmol/L    CO2 27 21 - 32 mmol/L    Anion Gap 13 3 - 16    Glucose 97 70 - 99 mg/dL    BUN 9 7 - 20 mg/dL    CREATININE 1.0 0.6 - 1.2 mg/dL    GFR Non- 53 (A) >60    GFR African American >60 >60    Calcium 9.9 8.3 - 10.6 mg/dL   Hepatic Function Panel   Result Value Ref Range    Total Protein 6.9 6.4 - 8.2 g/dL    Albumin 4.5 3.4 - 5.0 g/dL    Alkaline Phosphatase 75 40 - 129 U/L    ALT 12 10 - 40 U/L    AST 20 15 - 37 U/L    Total Bilirubin 1.1 (H) 0.0 - 1.0 mg/dL    Bilirubin, Direct <0.2 0.0 - 0.3 mg/dL    Bilirubin, Indirect see below 0.0 - 1.0 mg/dL   Lipase   Result Value Ref Range    Lipase 16.0 13.0 - 60.0 U/L   Troponin   Result Value Ref Range    Troponin <0.01 <0.01 ng/mL   Brain Natriuretic Peptide   Result Value Ref Range    Pro- (H) 0 - 449 pg/mL   Blood Gas, Venous   Result Value Ref Range    pH, Rolo 7.393 7.350 - 7.450    pCO2, Rolo 53.5 (H) 41.0 - 51.0 mmHg    pO2, Rolo <30.0 25.0 - 40.0 mmHg    HCO3, Venous 32.6 (H) 24.0 - 28.0 mmol/L    Base Excess, Rolo 6.3 (H) -2.0 - 3.0 mmol/L    O2 Sat, Rolo 34 Not established %    Carboxyhemoglobin 1.2 0.0 - 1.5 %    MetHgb, Rolo 0.4 0.0 - 1.5 %    TC02 (Calc), Rolo 34 mmol/L    Hemoglobin, Rolo, Reduced 65.10 %   Lactic Acid   Result Value Ref Range    Lactic Acid 1.2 0.4 - 2.0 mmol/L   Urinalysis with Microscopic   Result Value Ref Range    Color, UA Yellow Straw/Yellow    Clarity, UA Clear Clear    Glucose, Ur Negative Negative mg/dL    Bilirubin Urine Negative Negative    Ketones, Urine Negative Negative mg/dL    Specific Gravity, UA 1.020 1.005 - 1.030    Blood, Urine Negative Negative    pH, UA 6.0 5.0 - 8.0    Protein, UA TRACE (A) Negative mg/dL    Urobilinogen, Urine 0.2 <2.0 E.U./dL    Nitrite, Urine Negative Negative    Leukocyte Esterase, Urine Negative Negative    Microscopic Examination YES     Urine Type Voided     WBC, UA 0-2 0 - 5 /HPF    RBC, UA 0-2 0 - 4 /HPF Epithelial Cells, UA 2-5 0 - 5 /HPF    Amorphous, UA 2+ /HPF   Magnesium   Result Value Ref Range    Magnesium 2.10 1.80 - 2.40 mg/dL   EKG 12 Lead   Result Value Ref Range    Ventricular Rate 64 BPM    Atrial Rate 64 BPM    P-R Interval 202 ms    QRS Duration 104 ms    Q-T Interval 428 ms    QTc Calculation (Bazett) 441 ms    P Axis 51 degrees    R Axis -50 degrees    T Axis 25 degrees    Diagnosis       EKG performed in ER and to be interpreted by ER physician. Confirmed by MD, ER ([id number]),  Elizaadebayo Arreola ([phone]) on 4/26/2022 7:09:40 PM       ED BEDSIDE ULTRASOUND:  None performed    Procedures     None performed    ED Course     Nursing Notes, Past Medical Hx, Past Surgical Hx, Social Hx, Allergies, and Family Hx were reviewed. The patient was given the following medications:  Orders Placed This Encounter   Medications    lactated ringers bolus    iopamidol (ISOVUE-370) 76 % injection 80 mL    potassium chloride (KLOR-CON M) extended release tablet 40 mEq       CONSULTS:  IP CONSULT TO SOCIAL WORK  IP CONSULT TO CASE MANAGEMENT    MEDICAL DECISIONMAKING / ASSESSMENT / Vena Judah is a 81 y.o. female with dizziness. Pt was hemodynamically stable and afebrile in the Emergency Department.     The following critical and emergent differential diagnoses were carefully considered, among other non-emergent diagnosis, when evaluating the patient for DIZZINESS/VERTIGO today:     CNS Infection (no fever), Vertebral Basilar Artery Insufficiency (no positional component, no loss of consciousness), Subclavian Steal (no positional component), ICH (no HA, negative imaging), Infarction (no persistent symptoms, everything intermittent), Traumatic Injury (no historical factor), Tumor (no vomiting, morning or positional worsening), MS, Intracranial Mass (no vomiting, morningor positional  worsening), Hypoglycemia (lab negative), and Various Peripheral Causes     Will obtain CT Head WO primarily due to elevated BP and symptoms. Given pain, advanced age will obtain cross-sectional abdominal imaging. Will obtain CXR given location of discomfort    EKG reassuring against ischemia. CT head is without evidence for intracranial hemorrhage or other etiology to explain her lightheadedness. CTAP without acute abnormality to explain her nausea  CXR without focal abnormality suggest pneumonia or pneumothorax. CBC with no leukocytosis, making systemic infection somewhat less likely. There is no significant new anemia. There is a mild thrombocytopenia. Basic metabolic panel without evidence of acute kidney injury or significant electrolyte derangement. There is mild hypokalemia which was treated with oral potassium. Magnesium is normal so anticipate that she will likely replete adequately with the ordered amount of potassium. Hepatic function panel unremarkable making acute hepatitis or biliary pathology less likely. Lipase is not elevated being pancreatitis unlikely. Urinalysis unremarkable and free of evidence of acute urinary tract infection. Troponin not elevated making ACS less likely. Given duration of symptoms, repeat not felt to be required BNP mildly elevated making acute exacerbation of heart failure possible but in the context of patient's overall presentation is very low suspicion did not feel that further work-up was required in the emergency department. VBG without evidence for any acute respiratory, and x-ray retention    Lactate not elevated making systemic infection and hypoperfusion somewhat less likely. On reassessment, patient reports improvement. She is able to tolerate p.o. intake without difficulty. I had discussion with the patient and her family members about discharge home. Certainly her family murders are concerned that her dementia is reaching a point where she is not safe at home.   I do note that she has had multiple presentations for similar lightheadedness without any clear etiology identified other than the patient is feeling to eat adequately at home. She is very thin and has had some weight loss. Her family expresses concern that her ability to care for self including the ability to manage her medications is significantly at risk due to her dementia. She states that her neurologist is also expresses concern as well. Given this concern, the patient's family and the patient requested evaluation for possible need for additional services. As the patient is now here in the evening, we cannot accommodate this immediately in the emergency department. She has no indication for admission to the hospital so per hospital policy, the patient will be held in the ED pending PT/OT/SW/CM consultation and re-evaluation      Clinical Impression     1.  Lightheadedness           Disposition     PATIENT REFERRED TO:  Ana Maria Peterson MD  [address] [place] [id number] [phone]    Schedule an appointment as soon as possible for a visit   Discuss your ED visit, and referrals/medication      DISCHARGE MEDICATIONS:  New Prescriptions    No medications on file       DISPOSITION    ED observation     Philip Chappell MD  04/26/22 3154

## 2022-04-26 NOTE — ED NOTES
Ambulatory to BR with standby assist to void. Gait steady.  Voided without difficulty     Sahra Bowling RN  04/26/22 9799

## 2022-04-26 NOTE — ED NOTES
Lunch box with chicken salad, pretzels and an orange provided to patient. Tolerating PO well.      Ciro George RN  04/26/22 4732

## 2022-04-27 VITALS
DIASTOLIC BLOOD PRESSURE: 77 MMHG | HEART RATE: 71 BPM | WEIGHT: 114.1 LBS | HEIGHT: 66 IN | OXYGEN SATURATION: 100 % | TEMPERATURE: 98.2 F | BODY MASS INDEX: 18.34 KG/M2 | RESPIRATION RATE: 16 BRPM | SYSTOLIC BLOOD PRESSURE: 158 MMHG

## 2022-04-27 PROCEDURE — 97116 GAIT TRAINING THERAPY: CPT

## 2022-04-27 PROCEDURE — 97530 THERAPEUTIC ACTIVITIES: CPT

## 2022-04-27 PROCEDURE — 97535 SELF CARE MNGMENT TRAINING: CPT

## 2022-04-27 PROCEDURE — 97165 OT EVAL LOW COMPLEX 30 MIN: CPT

## 2022-04-27 PROCEDURE — 97162 PT EVAL MOD COMPLEX 30 MIN: CPT

## 2022-04-27 RX ORDER — DONEPEZIL HYDROCHLORIDE 10 MG/1
10 TABLET, FILM COATED ORAL NIGHTLY
Status: DISCONTINUED | OUTPATIENT
Start: 2022-04-27 | End: 2022-04-27 | Stop reason: HOSPADM

## 2022-04-27 RX ORDER — ASPIRIN 81 MG/1
81 TABLET ORAL DAILY
Status: DISCONTINUED | OUTPATIENT
Start: 2022-04-27 | End: 2022-04-27 | Stop reason: HOSPADM

## 2022-04-27 RX ORDER — DEXTRAN, HYPROMELLOSE 2910 .001; .003 ML/ML; ML/ML
1 SOLUTION OPHTHALMIC PRN
COMMUNITY

## 2022-04-27 RX ORDER — M-VIT,TX,IRON,MINS/CALC/FOLIC 27MG-0.4MG
1 TABLET ORAL DAILY
Status: DISCONTINUED | OUTPATIENT
Start: 2022-04-27 | End: 2022-04-27 | Stop reason: HOSPADM

## 2022-04-27 RX ORDER — LANOLIN ALCOHOL/MO/W.PET/CERES
500 CREAM (GRAM) TOPICAL DAILY
Status: DISCONTINUED | OUTPATIENT
Start: 2022-04-27 | End: 2022-04-27 | Stop reason: CLARIF

## 2022-04-27 RX ORDER — ROSUVASTATIN CALCIUM 20 MG/1
40 TABLET, COATED ORAL NIGHTLY
Status: DISCONTINUED | OUTPATIENT
Start: 2022-04-27 | End: 2022-04-27 | Stop reason: HOSPADM

## 2022-04-27 RX ORDER — AMLODIPINE BESYLATE 5 MG/1
5 TABLET ORAL DAILY
Status: DISCONTINUED | OUTPATIENT
Start: 2022-04-27 | End: 2022-04-27 | Stop reason: HOSPADM

## 2022-04-27 RX ORDER — ESCITALOPRAM OXALATE 10 MG/1
10 TABLET ORAL DAILY
Status: DISCONTINUED | OUTPATIENT
Start: 2022-04-27 | End: 2022-04-27 | Stop reason: HOSPADM

## 2022-04-27 RX ORDER — LOSARTAN POTASSIUM 50 MG/1
50 TABLET ORAL DAILY
Status: DISCONTINUED | OUTPATIENT
Start: 2022-04-27 | End: 2022-04-27 | Stop reason: HOSPADM

## 2022-04-27 RX ORDER — MULTIVIT-MIN/IRON/FOLIC ACID/K 18-600-40
1 CAPSULE ORAL DAILY
Status: DISCONTINUED | OUTPATIENT
Start: 2022-04-27 | End: 2022-04-27 | Stop reason: CLARIF

## 2022-04-27 RX ORDER — ACETAMINOPHEN 325 MG/1
650 TABLET ORAL EVERY 8 HOURS PRN
Status: DISCONTINUED | OUTPATIENT
Start: 2022-04-27 | End: 2022-04-27 | Stop reason: HOSPADM

## 2022-04-27 ASSESSMENT — PAIN - FUNCTIONAL ASSESSMENT
PAIN_FUNCTIONAL_ASSESSMENT: NONE - DENIES PAIN
PAIN_FUNCTIONAL_ASSESSMENT: NONE - DENIES PAIN

## 2022-04-27 NOTE — CARE COORDINATION
SW received consult this AM re: placement. PT/OT evals ordered. SW will await evals as needed for placement and then will follow up with Pt's son as Pt has dementia. Will follow and advise. Siomara Sanchez [place]  Case Management  [phone]      1011-Addendum  BRAYAN spoke with PT/OT re: [facility] scores are 20/24 & 20/24 - Pt able to return home with skilled Home Care. SW met with Pt, son, and spoke with her sister via son's speaker phone. Pt lives alone in home. Son states Pt is able to go to families home but Pt refuses. Pt states, \"I want to be in my home. \" SW discussed skilled Home Care services - all are in agreement with no preference in agency. All agree with referral to [facility]. We also dicussed [facility] on [address] as Pt's brother is active with them. All agreed for SW to make a referral to [facility] for Pt. Son to be  for [facility]. Son will provide transport home. BRAYAN spoke with Ema Lopez with [facility] - she will make all skilled Home Care arrangements for Pt. BRAYAN will make a referral to HARPER Vgea with [facility].     Siomara Sanchez, [place]  Case Management  [phone]

## 2022-04-27 NOTE — PROGRESS NOTES
Physical Therapy  Facility/Department: [facility]  Physical Therapy Initial Assessment/Treatment/DC    Name: Johnathan Umanzor  : 1935  MRN: [id number]  Date of Service: 2022    Discharge Recommendations: Johnathan Umanzor scored a 20/24 on the AM-PAC short mobility form. Current research shows that an AM-PAC score of 18 or greater is typically associated with a discharge to the patient's home setting. Based on the patient's AM-PAC score and their current functional mobility deficits, it is recommended that the patient have 2-3 sessions per week of Physical Therapy at d/c to increase the patient's independence. HOME HEALTH CARE: LEVEL 1 STANDARD    - Initial home health evaluation to occur within 24-48 hours, in patient home   - Therapy to evaluate with goal of regaining prior level of functioning   - Therapy to evaluate if patient has [address] needs for personal care          PT Equipment Recommendations  Equipment Needed: No      Patient Diagnosis(es): The encounter diagnosis was Lightheadedness. Past Medical History:  has a past medical history of Allergic rhinitis, Depression, DJD (degenerative joint disease), Hyperlipidemia, Hypertension, and Thrombocytopenia ([facility] [address] Past Surgical History:  has a past surgical history that includes Breast lumpectomy; Hemorrhoid surgery; Hysterectomy; and Cataract removal with implant. Assessment   Assessment: 82 yo seen in ED for lightheadedness/nausea. Son reporting concerns for dementia & pt being home alone. Pt demo steady gait around unit today & had no difficulty up/down a flight of stairs. Appears to be at her baseline for mobility. Son reports he feels she is forgetting to eat at home due to her dementia. Pt could certainly get weaker if not eating & could progress to falling more often.   Discussed with son that if pt goes home, she could benefit from increased assistance (24hr if possible), home PT, life alert or video monitoring so son could check on her without coming over. Son expressed interest in the video monitoring. Pt appears appropriate for increased care at a memory care unit or AL if she would be agreeable. Discussed with CM & her son. Son reports she has consistently refused to go anywhere in the past.  CM to speak with pt/son regarding options. Will sign off at this time. Recommend home PT at minimum if pt leaves today. Decision Making: Medium Complexity  Requires PT Follow-Up: No  Activity Tolerance  Activity Tolerance: Patient tolerated treatment well  Activity Tolerance Comments: appeared forgetful at times- took a long time & cues to think of emergency number (911) to call  if there was a fire/emergency;  /85 after walking & pt c/o lightheadedness     Plan   Safety Devices  Type of Devices: Bed alarm in place,Call light within reach,Left in bed,Nurse notified     Restrictions  Position Activity Restriction  Other position/activity restrictions: no restrictions listed     Subjective   General  Chart Reviewed: Yes  Patient assessed for rehabilitation services?: Yes  Additional Pertinent Hx: Came to ED with lightheadedness/nausea. Head CT (-) for acute abnormalitites. CXR (-). Family / Caregiver Present: Yes (son, Katerin Harris)  Referring Practitioner: Marlon Pabon MD  Follows Commands: Impaired (slight difficulty with MMT commands)  Subjective  Subjective: Found in bed in ED, agreeable to PT. \"I just sometimes feel lightheaded. \"  \"Nausea in my head. \"  \"Especially when I haven't eaten. \"  Son, Katerin Harris reports pt with some dementia, pt forgets to eat at home. Concerned about pt managing at home but states she is stubborn/worries about \"looking crazy\" & refuses to go anywhere but home.          Social/Functional History  Social/Functional History  Lives With: Alone  Type of Home: Apartment  Home Layout: One level  Home Access: Stairs to enter with rails  Entrance Stairs - Number of Steps: 6 BOB  Bathroom Shower/Tub: Tub/Shower unit  Bathroom Toilet: Standard (sink next to)  [employer]: Toilet raiser  Home Equipment: [employer]  Has the patient had two or more falls in the past year or any fall with injury in the past year?: Yes (pt unable to recall, her son reports last fall about 5-6 months ago)  ADL Assistance: Independent (sponge bathes at sink)  Homemaking Assistance: Needs assistance (son bringing her meals; pt doing her own laundry which is not in the apartment)  Ambulation Assistance: Independent, pt reports she occasionally uses her cane but son reports he never sees her use it  Transfer Assistance: Independent  Active : No (son drives her to appointments)  Leisure & Hobbies: watch tv, tidy the home  Additional Comments: Son comes by every other day. Pt's sister is nurse & checks on her occasionally. Son states pt doesn't use oven or microwave and generally eats cold foods however lately her fridge has remained untouched, pt with decreased PO intake. Vision/Hearing  Vision Exceptions: Wears glasses at all times  Hearing: Within functional limits    Cognition   Orientation  Orientation Level: Oriented to person;Oriented to place; Disoriented to time     Objective               AROM RLE (degrees)  RLE AROM: WNL  AROM LLE (degrees)  LLE AROM : WNL  Strength RLE  Comment: hip flex 4/5, hams 4/5, quads & d 5/5  Strength LLE  Comment: hams 4/5, quads, df, hip flex 5/5           Bed mobility  Supine to Sit: Independent  Sit to Supine: Independent  Transfers  Sit to Stand: Contact guard assistance;Stand by assistance (CGA initially- pt c/o lightheadedness & demo slight post lean; SBA remaining trials)  Stand to sit: Stand by assistance  Ambulation  Surface: level tile  Device: No Device  Assistance: Stand by assistance  Quality of Gait: first few steps, pt holding L thigh & c/o \"nausea in her head\"; then straightened up & walked with normal lizzy; noted 1-2 side steps when walking but no loss of balance  Distance: 200', 120'  Stairs/Curb  Stairs?: Yes (up/down 10 steps with 1 rail SBA without loss of balance)   Pt picked up box of gloves on floor with SBA. Side stepping 5' x 2 B SBA without LOB. Balance  Sitting - Static: Good  Sitting - Dynamic:  (slight post lean with MMT of legs)    Treatment included gait/transfer training, pt & familly education.                                                           AM-PAC Score  AM-PAC Inpatient Mobility Raw Score : 20 (04/27/22 0914)  AM-PAC Inpatient T-Scale Score : 47.67 (04/27/22 0914)  Mobility Inpatient CMS 0-100% Score: 35.83 (04/27/22 0914)  Mobility Inpatient CMS G-Code Modifier : CJ (04/27/22 8983)          Goals  Short Term Goals  Time Frame for Short term goals: none set due to anticipated dc today       Therapy Time   Individual Concurrent Group Co-treatment   Time In 0807         Time Out 0901         Minutes 54           Timed Code Treatment Minutes:   40    Total Treatment Minutes:  [address], [address] [facility]

## 2022-04-27 NOTE — ED NOTES
Clinical Pharmacy Progress Note  Medication History     Admit Date: 4/26/2022    List of of current medications patient is taking is complete. Home Medication list in EPIC updated to reflect changes noted below. Source of information: patient interview with the help of her son, pharmacy fills, [facility] documentation    Patient's home pharmacy: [facility] made to medication list:   Medications added:   · Tears opth prn for dry eyes  Medications removed:   Lidocaine 5% oint   Flonase    Allegra   Vit B1  Medication doses adjusted:    Escitalopram -- changed from 5mg to 10mg daily  Other notes:    Per son and patient, she denies taking OTC medications at home. As she does not have an Rx for ASA, it is unclear if she really takes this at home. Left on the list as it appears PCP would like this; discussed with son. Please call with any questions.   Peewee FraustoD., [facility]   4/27/2022 10:10 AM  Wireless: [phone]

## 2022-04-27 NOTE — ED NOTES
Breakfast tray ordered. Pancakes, gamez, fruit cup, and OJ.  Family at bedside     [facility]  04/27/22 1012

## 2022-04-27 NOTE — ED NOTES
[facility] Emergency Department  Status Note   Emergency Physicians          Alison 36 remained in the Emergency Department awaiting SW/CM/PT/OT evaluation    The patient's condition remained unchanged. The patient cleared for home with OT with home services  The patient cleared for home with home PT home services  Social work saw the patient and was able to arrange suitable home services. On my reassessment, patient has no acute changes in her status. She denies any symptoms and request to go home. Her son is at bedside and in agreement with plan. Given there are no acute medical needs for further evaluation, she is suitable for discharge home.      Plan      - Discharge home with home services, return precautions           Darío Avila MD  04/27/22 2490

## 2022-04-27 NOTE — DISCHARGE INSTR - COC
Continuity of Care Form    Patient Name: Vesna Newton   :  1935  MRN:  [id number]    Admit date:  2022  Discharge date:  ***    Code Status Order: Prior   Advance Directives:      Admitting Physician:  No admitting provider for patient encounter. PCP: Massiel Burt MD    Discharging Nurse: [facility] Unit/Room#: E40/K80-15  Discharging Unit Phone Number: ***    Emergency Contact:   Extended Emergency Contact Information  Primary Emergency Contact:  [address] [place] [address] Phone: [phone]  Relation: Child  Secondary Emergency Contact: [address] [facility] [place] [address] Phone: [phone]  Relation: Brother/Sister    Past Surgical History:  Past Surgical History:   Procedure Laterality Date    BREAST LUMPECTOMY      RT - BENIGN    CATARACT REMOVAL WITH IMPLANT      no implant per pt     HEMORRHOID SURGERY      HYSTERECTOMY         Immunization History:   Immunization History   Administered Date(s) Administered    COVID-19, Pfizer Purple top, DILUTE for use, 12+ yrs, 30mcg/0.3mL dose 2021, 2021    Influenza 2012    Influenza Virus Vaccine 10/26/2011    Influenza, High Dose (Fluzone 65 yrs and older) 2013, 10/29/2014, 2016, 2017, 2018    Influenza, Quadv, adjuvanted, 65 yrs +, IM, PF (Fluad) 2021    Influenza, Triv, inactivated, subunit, adjuvanted, IM (Fluad 65 yrs and older) 10/15/2019, 2020    Pneumococcal Conjugate 13-valent (Pfujxay66) 2018    Pneumococcal Polysaccharide (Iwkbelkdr82) 2012    Tdap (Boostrix, Adacel) 07/10/2014       Active Problems:  Patient Active Problem List   Diagnosis Code    HTN (hypertension) I10    Hyperlipidemia E78.5    Allergic rhinitis J30.9    Depression F32. A    DJD (degenerative joint disease) M19.90    Vitamin D deficiency E55.9    Low back pain M54.50    Muscle cramp R25.2    Shoulder pain M25.519    Cataract, left H26.9    Wrist pain, right / RT THUMB PAIN M25.531    Shoulder pain, bilateral M25.511, M25.512    Heart murmur previously undiagnosed R01.1    Bilateral shoulder pain M25.511, M25.512    Thrombocytopenia (HCC) D69.6    Chronic rhinitis J31.0    Bilateral sensorineural hearing loss H90.3    Lumbar sprain S33. 5XXA    Pulmonary nodule/ ABN CT R91.1    Vertigo R42    Fall at home W19. XXXA, Y92.009    Loss of balance R26.89    Gallstones K80.20    Lightheaded R42    Cognitive decline R41.89    Moderate episode of recurrent major depressive disorder (HCC) F33.1    Late onset Alzheimer's disease without behavioral disturbance (HCC) G30.1, F02.80       Isolation/Infection:   Isolation            No Isolation          Patient Infection Status       None to display            Nurse Assessment:  Last Vital Signs: BP (!) 160/86   Pulse 54   Temp 98.2 °F (36.8 °C) (Oral)   Resp 20   Ht 5' 6\" (1.676 m)   Wt 114 lb 1.6 oz (51.8 kg)   SpO2 100%   BMI 18.42 kg/m²     Last documented pain score (0-10 scale):    Last Weight:   Wt Readings from Last 1 Encounters:   04/26/22 114 lb 1.6 oz (51.8 kg)     Mental Status:  {IP PT MENTAL STATUS:20030}    IV Access:  { ELISABET IV ACCESS:996027303}    Nursing Mobility/ADLs:  Walking   {[facility] DME BPKI:653429737}  Transfer  {[facility] DME RHRI:210768423}  Bathing  {[facility] DME OOZE:795219386}  Dressing  {[facility] DME WMZL:368262978}  Toileting  {[facility] DME XMMV:584887877}  Feeding  {[facility] DME XRDI:516801443}  Med Admin  {[facility] DME LMCP:009829735}  Med Delivery   { ELISABET MED Delivery:770385858}    Wound Care Documentation and Therapy:        Elimination:  Continence: Bowel: {YES / CO:03984}  Bladder: {YES / OC:77878}  Urinary Catheter: {Urinary Catheter:298072682}   Colostomy/Ileostomy/Ileal Conduit: {YES / BM:79426}       Date of Last BM: ***    Intake/Output Summary (Last 24 hours) at 4/27/2022 1032  Last data filed at 4/26/2022 1845  Gross per 24 hour   Intake 502.61 ml   Output --   Net 502.61 ml     I/O last 3 completed shifts:   In: 502.6 [IV Piggyback:502.6]  Out: -     Safety Concerns:     { ELISABET Safety Concerns:887450972}    Impairments/Disabilities:      { ELISABET Impairments/Disabilities:280134879}    Nutrition Therapy:  Current Nutrition Therapy:   { ELISABET Diet List:736984026}    Routes of Feeding: {P DME Other Feedings:196818600}  Liquids: {Slp liquid thickness:03847}  Daily Fluid Restriction: {CHP DME Yes amt example:284628213}  Last Modified Barium Swallow with Video (Video Swallowing Test): {Done Not Done CPSE:460277743}    Treatments at the Time of Hospital Discharge:   Respiratory Treatments: ***  Oxygen Therapy:  {Therapy; copd oxygen:98136}  Ventilator:    {[facility] Vent ZISL:528597627}    Rehab Therapies: HOME HEALTH CARE: LEVEL 2 SOCIAL     Home health agency to establish plan of care for patient over 60 day period   Nursing  Initial home SN evaluation visit to occur within 24-48 hours for:  medication management  VS and clinical assessment  S&S chronic disease exacerbation education + when to contact MD / NP  care coordination  Medication Reconciliation during 1st SN visit    PT/OT - therapy goal is to regaining prior level of functioning   Evaluations in home within 24-48 hours of discharge to include DME and home safety   OT to evaluate if patient has [address] needs for personal care      visit within 24-48 hours to evaluate resources & insurance for potential AL, [place], LTC, and Medicaid options  [place] on Aging Referral    Dietician evaluation within five days of discharge     Family/POA Care Conference to discuss home support and care needs to occur within two weeks of discharge    Weight Bearing Status/Restrictions: [address] Weight Bearin}  Other Medical Equipment (for information only, NOT a DME order):  {EQUIPMENT:832776641}  Other Treatments: ***    Patient's personal belongings (please select all that are sent with patient):  {[facility] DME Belongings:483330189}    RN SIGNATURE: {Esignature:046507479}    CASE MANAGEMENT/SOCIAL WORK SECTION    Inpatient Status Date: ***    Readmission Risk Assessment Score:  Readmission Risk              Risk of Unplanned Readmission:  0           Discharging to Facility/ Agency   Name:   Address:  Phone:  Fax:    Dialysis Facility (if applicable)   Name:  Address:  Dialysis Schedule:  Phone:  Fax:    / signature: {Esignature:761960500}    PHYSICIAN SECTION    Prognosis: {Prognosis:7326446271}    Condition at Discharge: [address] [place] [address] Patient Condition:955630964}    Rehab Potential (if transferring to Rehab): {Prognosis:9512065098}    Recommended Labs or Other Treatments After Discharge: ***    Physician Certification: I certify the above information and transfer of Emelda Koyanagi  is necessary for the continuing treatment of the diagnosis listed and that she requires {Admit to Appropriate Level of Care:67961} for {GREATER/LESS:694948544} 30 days.      Update Admission H&P: {CHP DME Changes in FICW:219869666}    PHYSICIAN SIGNATURE:  {Esignature:444811892}

## 2022-04-27 NOTE — ED PROVIDER NOTES
[facility] Emergency Department  Status Note   Emergency Physicians          Assessment      Edison Urbina remains in the Emergency Department awaiting PT/OT evaluation and social work evaluation for possible placement for dementia with behavioral disturbance. The patient's condition is stable. She was noted occasionally to be wandering by nursing staff was able to be redirected into bed. She is confused as to why she is here.        Plan      -PT/OT evaluation  -Social work evaluation          Gabriella Mejia MD  04/27/22 3258

## 2022-04-27 NOTE — CARE COORDINATION
[facility]    Patient aware and agreeable to services.  Faxed orders to [facility] for [facility] by 4/29    Leslie Johnson LPN  Care Transition Nurse  [address]  [phone]

## 2022-04-27 NOTE — PROGRESS NOTES
Occupational Therapy  Facility/Department: [address] [place] [address]  Occupational Therapy Initial Assessment    Name: Concetta Diop  : 1935  MRN: [id number]  Date of Service: 2022    Discharge Recommendations: Concetta Diop scored a 20/24 on the AM-PAC ADL Inpatient form. Current research shows that an AM-PAC score of 18 or greater is typically associated with a discharge to the patient's home setting. Based on the patient's AM-PAC score, and their current ADL deficits, it is recommended that the patient have 2-3 sessions per week of Occupational Therapy at d/c to increase the patient's independence. At this time, this patient demonstrates the endurance and safety to discharge home with home services for increased safety and ADL participation with a follow up treatment frequency of 2-3x/wk. Please see assessment section for further patient specific details. If patient discharges prior to next session this note will serve as a discharge summary. Please see below for the latest assessment towards goals. Patient Diagnosis(es): The encounter diagnosis was Lightheadedness. Past Medical History:  has a past medical history of Allergic rhinitis, Depression, DJD (degenerative joint disease), Hyperlipidemia, Hypertension, and Thrombocytopenia ([facility] [address] Past Surgical History:  has a past surgical history that includes Breast lumpectomy; Hemorrhoid surgery; Hysterectomy; and Cataract removal with implant. Assessment   Performance deficits / Impairments: Decreased cognition;Decreased safe awareness;Decreased ADL status  Assessment: pt is an 81 y.o. female with PMHx of dementia presenting with dizziness. Pt required SBA for functional mobility and transfers and is independent with bed mobility. Pt demos function UE strength and ROM. There are home safety concerns 2/2 pts cognition.  Per son report pt has not been eating and forgetful in the home and is unlikely pt would act appropriately in an emergency. When asked who she would call for a fire pt required max VCs and prompts to provide \"Emergency services\" and \"911\". Pt would benefit from 24/hr spv and assist PRN at DC for increased safety and participation with ADLs. Pt has no acute OT needs however would benefit from [facility]. Prognosis: Good  Decision Making: Low Complexity  REQUIRES OT FOLLOW-UP: No  Activity Tolerance  Activity Tolerance: Patient Tolerated treatment well  Activity Tolerance: pt reported mild dizziness upon walking, BP obtained 184/85              Restrictions  Position Activity Restriction  Other position/activity restrictions: no restrictions listed    Subjective   General  Chart Reviewed: Yes  Patient assessed for rehabilitation services?: Yes  Additional Pertinent Hx: PMHx: Dementia, hyperlipidemia, hypertension. Presents with lightheadedness and nausea  Family / Caregiver Present: Yes (son)  Referring Practitioner: Dimas Dandy  Subjective  Subjective: pt in bed upon arrival, agreeable to OT/PT evaluation. Son at bedside assisting with some history and home information. Son stating pt is downplaying her abilities.   Pre Treatment Pain Screening  Comments / Details: denies pain  Social/Functional History  Social/Functional History  Lives With: Alone  Type of Home: Apartment  Home Layout: One level  Home Access: Stairs to enter with rails  Entrance Stairs - Number of Steps: 6 BOB  Bathroom Shower/Tub: Tub/Shower unit  Bathroom Toilet: Standard (sink next to)  [employer]: Toilet raiser  Home Equipment: Holliday Cholo  Has the patient had two or more falls in the past year or any fall with injury in the past year?: Yes (pt unable to recall, her son reports last fall about 5-6 months ago)  ADL Assistance: Independent (sponge bathes at sink)  Homemaking Assistance: Needs assistance (son bringing her meals; pt doing her own laundry which is not in the apartment)  Ambulation Assistance: Independent  Transfer Assistance: Independent  Active : No (son drives her to appointments)  Leisure & Hobbies: watch tv, tidy the home  Additional Comments: Son comes by every other day. Pt's sister is nurse & checks on her occasionally. Son states pt doesn't use oven or microwave and generally eats cold foods however lately her fridge has remained untouched, pt with decreased PO intake. Objective              Safety Devices  Type of Devices: Bed alarm in place;Call light within reach; Left in bed;Nurse notified  Balance  Sitting Balance: Independent (EOB for strength assessment. Independent static, SPV dynamic)  Standing Balance: Stand by assistance  Standing Balance  Time: ~4 min total  Activity: functional mobility  Comment: pt stood with SBA and no AD  Functional Mobility  Functional - Mobility Device: No device  Activity: To/from bathroom; Other (in zayas)  Assist Level: Stand by assistance  Functional Mobility Comments: SBA to ambulate without AD. occasional sway without LOB. Toilet Transfers  Toilet - Technique: Ambulating  Equipment Used: Standard toilet  Toilet Transfer: Stand by assistance  Toilet Transfers Comments: SBA from commode     ADL  Feeding: Setup  Feeding Skilled Clinical Factors: pt ate breakfast semi supine in bed with setup. Additional Comments: Pt declined ADLs. Pt reports no difficulty with dressing at home.      Activity Tolerance  Activity Tolerance: Patient tolerated treatment well  Activity Tolerance Comments: appeared forgetful at times- took a long time & cues to think of emergency number (911) to call  if there was a fire/emergency;  /85 after walking & pt c/o lightheadedness  Bed mobility  Supine to Sit: Independent  Sit to Supine: Independent  Transfers  Sit to stand: Contact guard assistance;Stand by assistance  Stand to sit: Stand by assistance  Transfer Comments: CGA for initial transfer from bed to stand, SBA for 2nd trial.     Cognition  Overall Cognitive Status: Exceptions  Arousal/Alertness: Appropriate responses to stimuli  Following Commands: Follows one step commands with repetition; Follows one step commands with increased time  Attention Span: Attends with cues to redirect; Difficulty attending to directions  Memory: Decreased recall of biographical Information;Decreased short term memory;Decreased recall of recent events;Decreased recall of precautions;Decreased long term memory  Safety Judgement: Decreased awareness of need for assistance  Problem Solving: Assistance required to identify errors made;Assistance required to generate solutions  Insights: Decreased awareness of deficits  Initiation: Requires cues for some  Sequencing: Requires cues for some  Cognition Comment: pt generally oriented and cognitively functional for conversation however with deficits. Per pt son, pt is down playing her dementia and forgetfulness. Pt required max VCs and prompts to provide \"emergency services\" and \"415\" when asked who she should call if trapped in a fire. Education Given To: Patient; Family  Education Provided: Role of Therapy;Plan of Care;Precautions; Family Education; Fall Prevention Strategies  Education Provided Comments: son educated on home aid, life alert and video monitoring.   Education Method: Verbal;Demonstration  Barriers to Learning: Cognition  Education Outcome: Verbalized understanding  LUE AROM (degrees)  LUE AROM : WFL  RUE AROM (degrees)  RUE AROM : WFL   BUE strength: [facility]                                                                    AM-PAC Score        AM-PAC Inpatient Daily Activity Raw Score: 20 (04/27/22 0918)  AM-PAC Inpatient ADL T-Scale Score : 42.03 (04/27/22 0918)  ADL Inpatient CMS 0-100% Score: 38.32 (04/27/22 9539)  ADL Inpatient CMS G-Code Modifier : CJ (04/27/22 5362)              Therapy Time   Individual Concurrent Group Co-treatment   Time In 0809         Time Out 0903         Minutes [address]

## 2022-04-28 ENCOUNTER — CARE COORDINATION (OUTPATIENT)
Dept: CARE COORDINATION | Age: 87
End: 2022-04-28

## 2022-04-28 NOTE — CARE COORDINATION
Ambulatory Care Coordination Note  4/28/2022  CM Risk Score: 5  Charlson 10 Year Mortality Risk Score: 79%     ACC: Mini Munoz, RN    Summary Note: Spoke with pt briefly as she was just laying down. Went to ER for dizziness and denies any at this time. Reports has been eating and taking her pills. Gave verbal ok to speak with her son Yoel. Reports she spoke with her son Yoel today . Attempted to call son and left message to return call. Called [facility] and spoke with Dexter Stevens in intake, which they spoke with son and plans to go out to pt's home tomorrow (4/29/22). PLAN  1) fu appt for ER f/u  2) review meds  3) Offer info on [facility] ([phone]  4) Offer COA ( [address])  5) finish assessment  6) fu with [facility] if needed ( [phone])      Ambulatory Care Coordination Assessment    Care Coordination Protocol  Referral from Primary Care Provider: No  Week 1 - Initial Assessment        Do you have Home O2 Therapy?: No         Current Housing: Private Residence                 Suggested Interventions and Community Resources   Disease Assocation: In Process   Home Health Services: Completed (Comment: 4/28/22-Spoke with Dexter Stevens and they will be out tomorrow/[facility])                  Prior to Admission medications    Medication Sig Start Date End Date Taking?  Authorizing Provider   dextran 70-hypromellose (TEARS PURE) 0.1-0.3 % SOLN opthalmic solution Place 1 drop into both eyes as needed (dry eyes)    Historical Provider, MD   amLODIPine (NORVASC) 5 MG tablet Take 1 tablet by mouth daily 4/4/22   Susannah Higginbotham MD   telmisartan (MICARDIS) 40 MG tablet Take 1 tablet by mouth daily 4/4/22   Susannah Higginbotham MD   donepezil (ARICEPT) 10 MG tablet TAKE ONE TABLET BY MOUTH ONCE NIGHTLY 4/4/22   Susannah Higginbotham MD   Cholecalciferol (VITAMIN D) 50 MCG (2000 UT) CAPS capsule Take 1 capsule by mouth daily 4/4/22   Susannah Higginbotham MD   rosuvastatin (CRESTOR) 40 MG tablet TAKE ONE TABLET BY MOUTH ONCE NIGHTLY 4/1/22   Venancio Palomino MD   Multiple Vitamins-Minerals (THERAPEUTIC MULTIVITAMIN-MINERALS) tablet Take 1 tablet by mouth daily 1/3/22 1/3/23  Venancio Palomino MD   escitalopram (LEXAPRO) 10 MG tablet Take 10 mg by mouth daily     Historical Provider, MD   aspirin 81 MG tablet Take 81 mg by mouth daily    Historical Provider, MD       No future appointments.

## 2022-04-29 NOTE — TELEPHONE ENCOUNTER
Allan Garcias,    Did you see my note? Just want to make sure before I close encounter on my end.   Wilmer Whyte

## 2022-05-19 ENCOUNTER — TELEPHONE (OUTPATIENT)
Dept: INTERNAL MEDICINE CLINIC | Age: 87
End: 2022-05-19

## 2022-05-19 NOTE — TELEPHONE ENCOUNTER
Patient would not allow the  (Hunter Chandler [phone]) to come see her or any of the other staff.  tried to contact her family members, but did not have any luck. I had to call her to tell her that the services were set up by her doctor and that it was okay for them to see her. Later her nurse (Domingo Cheung) went to see her and she reported that the patient is experiencing dizziness, sleeping a lot, poor hydration, poor appetite and her gate is off.  Her BP for today is 154/83  Please advise

## 2022-05-25 ENCOUNTER — OFFICE VISIT (OUTPATIENT)
Dept: INTERNAL MEDICINE CLINIC | Age: 87
End: 2022-05-25
Payer: MEDICARE

## 2022-05-25 VITALS
WEIGHT: 110.4 LBS | BODY MASS INDEX: 17.74 KG/M2 | HEART RATE: 73 BPM | OXYGEN SATURATION: 96 % | DIASTOLIC BLOOD PRESSURE: 78 MMHG | HEIGHT: 66 IN | SYSTOLIC BLOOD PRESSURE: 134 MMHG | TEMPERATURE: 97 F

## 2022-05-25 DIAGNOSIS — G30.1 LATE ONSET ALZHEIMER'S DISEASE WITHOUT BEHAVIORAL DISTURBANCE (HCC): Primary | ICD-10-CM

## 2022-05-25 DIAGNOSIS — E78.2 MIXED HYPERLIPIDEMIA: ICD-10-CM

## 2022-05-25 DIAGNOSIS — I71.40 ABDOMINAL AORTIC ANEURYSM (AAA) WITHOUT RUPTURE: ICD-10-CM

## 2022-05-25 DIAGNOSIS — R63.4 WEIGHT LOSS: ICD-10-CM

## 2022-05-25 DIAGNOSIS — I10 PRIMARY HYPERTENSION: ICD-10-CM

## 2022-05-25 DIAGNOSIS — E87.6 HYPOKALEMIA: ICD-10-CM

## 2022-05-25 DIAGNOSIS — F33.1 MODERATE EPISODE OF RECURRENT MAJOR DEPRESSIVE DISORDER (HCC): ICD-10-CM

## 2022-05-25 DIAGNOSIS — F02.80 LATE ONSET ALZHEIMER'S DISEASE WITHOUT BEHAVIORAL DISTURBANCE (HCC): Primary | ICD-10-CM

## 2022-05-25 DIAGNOSIS — D69.6 THROMBOCYTOPENIA (HCC): ICD-10-CM

## 2022-05-25 DIAGNOSIS — R42 DIZZINESS: ICD-10-CM

## 2022-05-25 DIAGNOSIS — E55.9 VITAMIN D DEFICIENCY: ICD-10-CM

## 2022-05-25 PROCEDURE — 1036F TOBACCO NON-USER: CPT | Performed by: INTERNAL MEDICINE

## 2022-05-25 PROCEDURE — 1123F ACP DISCUSS/DSCN MKR DOCD: CPT | Performed by: INTERNAL MEDICINE

## 2022-05-25 PROCEDURE — 1090F PRES/ABSN URINE INCON ASSESS: CPT | Performed by: INTERNAL MEDICINE

## 2022-05-25 PROCEDURE — G8400 PT W/DXA NO RESULTS DOC: HCPCS | Performed by: INTERNAL MEDICINE

## 2022-05-25 PROCEDURE — G8427 DOCREV CUR MEDS BY ELIG CLIN: HCPCS | Performed by: INTERNAL MEDICINE

## 2022-05-25 PROCEDURE — G8419 CALC BMI OUT NRM PARAM NOF/U: HCPCS | Performed by: INTERNAL MEDICINE

## 2022-05-25 PROCEDURE — 99215 OFFICE O/P EST HI 40 MIN: CPT | Performed by: INTERNAL MEDICINE

## 2022-05-25 RX ORDER — ROSUVASTATIN CALCIUM 40 MG/1
TABLET, COATED ORAL
Qty: 90 TABLET | Refills: 0 | Status: SHIPPED | OUTPATIENT
Start: 2022-05-25 | End: 2022-07-13 | Stop reason: SDUPTHER

## 2022-05-25 RX ORDER — MEMANTINE HYDROCHLORIDE 7 MG/1
7 CAPSULE, EXTENDED RELEASE ORAL DAILY
Qty: 30 CAPSULE | Refills: 3 | Status: SHIPPED | OUTPATIENT
Start: 2022-05-25 | End: 2022-08-05 | Stop reason: SDUPTHER

## 2022-05-25 RX ORDER — M-VIT,TX,IRON,MINS/CALC/FOLIC 27MG-0.4MG
1 TABLET ORAL DAILY
Qty: 30 TABLET | Refills: 3 | Status: SHIPPED | OUTPATIENT
Start: 2022-05-25 | End: 2023-05-25

## 2022-05-25 RX ORDER — TELMISARTAN 40 MG/1
40 TABLET ORAL DAILY
Qty: 90 TABLET | Refills: 0 | Status: CANCELLED | OUTPATIENT
Start: 2022-05-25

## 2022-05-25 RX ORDER — DONEPEZIL HYDROCHLORIDE 10 MG/1
TABLET, FILM COATED ORAL
Qty: 90 TABLET | Refills: 0 | Status: SHIPPED | OUTPATIENT
Start: 2022-05-25 | End: 2022-07-13 | Stop reason: SDUPTHER

## 2022-05-25 RX ORDER — AMLODIPINE BESYLATE 5 MG/1
5 TABLET ORAL DAILY
Qty: 90 TABLET | Refills: 0 | Status: CANCELLED | OUTPATIENT
Start: 2022-05-25

## 2022-05-25 RX ORDER — MULTIVIT-MIN/IRON/FOLIC ACID/K 18-600-40
1 CAPSULE ORAL DAILY
Qty: 90 CAPSULE | Refills: 0 | Status: SHIPPED | OUTPATIENT
Start: 2022-05-25 | End: 2022-08-05 | Stop reason: SDUPTHER

## 2022-05-25 RX ORDER — AMLODIPINE BESYLATE 5 MG/1
5 TABLET ORAL DAILY
Qty: 90 TABLET | Refills: 0 | Status: SHIPPED | OUTPATIENT
Start: 2022-05-25 | End: 2022-08-05 | Stop reason: SDUPTHER

## 2022-05-25 RX ORDER — TELMISARTAN 40 MG/1
40 TABLET ORAL DAILY
Qty: 90 TABLET | Refills: 0 | Status: SHIPPED | OUTPATIENT
Start: 2022-05-25 | End: 2022-08-05 | Stop reason: SDUPTHER

## 2022-05-25 NOTE — PATIENT INSTRUCTIONS
TAKE MED AS ADVISED    DIET/ EXERCISE. FOLLOW UP WITHIN 6 WEEKS / AS NEEDED    FOLLOW UP FOR FASTING [address] [facility] Laboratory Locations - No appointment necessary. @ indicates the location is open Saturdays in addition to Monday through Friday. Call your preferred location for test preparation, business hours and other information you need. [employer] accepts [employer]. [facility]    @ [place] [facility] [address] [facility] [address] [place] [address]. [place], [facility] [address]   Ph: [phone] [facility] Lab Svcs. [address]   Ph: [phone]  @ [facility] Lab Svcs. [address] [facility]   Ph: [phone]    [facility] Lab Svcs. [address] [facility] MorenaDavid matosbritt[address] [facility]   Ph: [phone] @ [place] Lab Svcs. [address]  Ph: [phone] @ [place] [facility] Lab Svcs. [address] [place] [address]   Ph: [phone]    [place]   @ [facility] Lab Svcs. [address] [place], [place] [address]   Ph: [phone] [facility] Med. Office Bldg. [address] [facility], [address] [place] [address]  Ph: [address] [facility], [address]   [address] Lab Svcs. [address] [facility]   Ph: [phone] [facility]. Lab Svcs.   [address] [facility], [address] [place] [address]   Ph: [phone]

## 2022-06-13 ENCOUNTER — TELEPHONE (OUTPATIENT)
Dept: INTERNAL MEDICINE CLINIC | Age: 87
End: 2022-06-13

## 2022-06-13 NOTE — TELEPHONE ENCOUNTER
There are several orders unsigned starting from April 27 to June 1st     Ben Lopez from [facility].  [address]:  [phone], Fax [phone]  Routed to Manager Vadim Myers)

## 2022-06-22 ENCOUNTER — CARE COORDINATION (OUTPATIENT)
Dept: CARE COORDINATION | Age: 87
End: 2022-06-22

## 2022-06-22 NOTE — CARE COORDINATION
Acm out reach call placed to pt and son for care coordination. No answer to phone . No vm picked up. Unable to leace message .  Will attempt agin at later date

## 2022-07-06 ENCOUNTER — CARE COORDINATION (OUTPATIENT)
Dept: CARE COORDINATION | Age: 87
End: 2022-07-06

## 2022-07-06 NOTE — CARE COORDINATION
ACM out reach call attempted this am to pt/ son. vm picked up, hipaa compliant message left.  Will make another attempt at later date

## 2022-07-11 DIAGNOSIS — G30.1 LATE ONSET ALZHEIMER'S DISEASE WITHOUT BEHAVIORAL DISTURBANCE (HCC): ICD-10-CM

## 2022-07-11 DIAGNOSIS — F02.80 LATE ONSET ALZHEIMER'S DISEASE WITHOUT BEHAVIORAL DISTURBANCE (HCC): ICD-10-CM

## 2022-07-11 DIAGNOSIS — E78.2 MIXED HYPERLIPIDEMIA: ICD-10-CM

## 2022-07-11 NOTE — TELEPHONE ENCOUNTER
----- Message from Ryan Fermin sent at 7/11/2022  2:37 PM EDT -----  Subject: Refill Request    QUESTIONS  Name of Medication? rosuvastatin (CRESTOR) 40 MG tablet  Patient-reported dosage and instructions? one pill once at night  How many days do you have left? 0  Preferred Pharmacy? [facility]  [id number]  Pharmacy phone number (if available)? [phone]  ---------------------------------------------------------------------------  --------------,  Name of Medication? donepezil (ARICEPT) 10 MG tablet  Patient-reported dosage and instructions? one tablet by mouth at night  How many days do you have left? 0  [facility]  [id number]  Pharmacy phone number (if available)? [phone]  ---------------------------------------------------------------------------  --------------  [employer]  What is the best way for the office to contact you? OK to leave message on   voicemail  Preferred Call Back Phone Number? [phone]  ---------------------------------------------------------------------------  --------------  SCRIPT ANSWERS  Relationship to Patient? Other  Representative Name? Son? Krissy Verdin  Is the [place] [employer] on the appropriate HIPAA document in Epic?  Yes

## 2022-07-13 ENCOUNTER — TELEPHONE (OUTPATIENT)
Dept: INTERNAL MEDICINE CLINIC | Age: 87
End: 2022-07-13

## 2022-07-13 RX ORDER — ROSUVASTATIN CALCIUM 40 MG/1
TABLET, COATED ORAL
Qty: 30 TABLET | Refills: 0 | Status: SHIPPED | OUTPATIENT
Start: 2022-07-13

## 2022-07-13 RX ORDER — DONEPEZIL HYDROCHLORIDE 10 MG/1
TABLET, FILM COATED ORAL
Qty: 30 TABLET | Refills: 0 | Status: SHIPPED | OUTPATIENT
Start: 2022-07-13 | End: 2022-08-05 | Stop reason: SDUPTHER

## 2022-07-13 NOTE — TELEPHONE ENCOUNTER
Patient had appointment that was cancelled this week due to provider being out of the office she is needing refill on medication please advise.

## 2022-07-19 ENCOUNTER — CARE COORDINATION (OUTPATIENT)
Dept: CARE COORDINATION | Age: 87
End: 2022-07-19

## 2022-07-19 NOTE — CARE COORDINATION
Ambulatory Care Coordination Note  7/19/2022    ACC: Melvin Leon, RN    Summary Note: out reach call placed to pt to check status and complete poc  Spoke with son Robbie Delgado , who reports pt is c/o feeling lightheaded discussed upcoming appointment 8/5 and interested in sooner oneif available call placed to practice and APRN has availability tomorrow , appt scheduled with son for tomorrow at 9:15 am.   Plan:  Follow up to assess need for community resources  Disease man agent education    Lab Results       None            Care Coordination Interventions    Referral from Primary Care Provider: No  Suggested Interventions and Community Resources  Disease Association: In Process (Comment: Dementia)  Home Health Services: Completed (Comment: 4/28/22-Spoke with Lazara Rashaad and they will be out tomorrow/[facility])          Goals Addressed    None         Prior to Admission medications    Medication Sig Start Date End Date Taking?  Authorizing Provider   rosuvastatin (CRESTOR) 40 MG tablet TAKE ONE TABLET BY MOUTH ONCE NIGHTLY 7/13/22   NEPTALI Hector   donepezil (ARICEPT) 10 MG tablet TAKE ONE TABLET BY MOUTH ONCE NIGHTLY 7/13/22   NEPTALI Hector   amLODIPine (NORVASC) 5 MG tablet Take 1 tablet by mouth daily 5/25/22   Randall Smith MD   telmisartan (MICARDIS) 40 MG tablet Take 1 tablet by mouth daily 5/25/22   Randall Smith MD   Cholecalciferol (VITAMIN D) 50 MCG (2000 UT) CAPS capsule Take 1 capsule by mouth daily 5/25/22   Randall Smith MD   Multiple Vitamins-Minerals (THERAPEUTIC MULTIVITAMIN-MINERALS) tablet Take 1 tablet by mouth daily 5/25/22 5/25/23  Randall Smith MD   memantine ER (NAMENDA XR) 7 MG CP24 extended release capsule Take 1 capsule by mouth daily 5/25/22   Randall Smith MD   Nutritional Supplements (ENSURE PLUS HN) LIQD Take 1 Can by mouth 3 times daily 5/25/22 6/24/22  Randall Smith MD   dextran 70-hypromellose (TEARS PURE) 0.1-0.3 % SOLN opthalmic solution Place 1 drop into both eyes as needed (dry eyes)    Historical Provider, MD   escitalopram (LEXAPRO) 10 MG tablet Take 10 mg by mouth daily     Historical Provider, MD   aspirin 81 MG tablet Take 81 mg by mouth daily    Historical Provider, MD       Future Appointments   Date Time Provider Kendal Jackson   7/20/2022  9:15 AM NEPTALI Summers [facility] [place] [facility] [place]   8/5/2022  1:20 PM MD TAHIR PennONDRONNIE [facility] [place] [facility] [place]    and   General Assessment    Do you have any symptoms that are causing concern?: Yes  Progression since Onset: Gradually Worsening  Reported Symptoms: Other (Comment: lightheadness)

## 2022-07-20 ENCOUNTER — OFFICE VISIT (OUTPATIENT)
Dept: INTERNAL MEDICINE CLINIC | Age: 87
End: 2022-07-20
Payer: MEDICARE

## 2022-07-20 ENCOUNTER — TELEPHONE (OUTPATIENT)
Dept: INTERNAL MEDICINE CLINIC | Age: 87
End: 2022-07-20

## 2022-07-20 VITALS
DIASTOLIC BLOOD PRESSURE: 80 MMHG | OXYGEN SATURATION: 96 % | BODY MASS INDEX: 15.59 KG/M2 | HEART RATE: 86 BPM | WEIGHT: 96.6 LBS | SYSTOLIC BLOOD PRESSURE: 102 MMHG

## 2022-07-20 DIAGNOSIS — E78.2 MIXED HYPERLIPIDEMIA: ICD-10-CM

## 2022-07-20 DIAGNOSIS — R79.89 ABNORMAL LFTS: ICD-10-CM

## 2022-07-20 DIAGNOSIS — N28.9 RENAL INSUFFICIENCY: ICD-10-CM

## 2022-07-20 DIAGNOSIS — E87.6 HYPOKALEMIA: ICD-10-CM

## 2022-07-20 DIAGNOSIS — R42 DIZZINESS: Primary | ICD-10-CM

## 2022-07-20 DIAGNOSIS — R42 DIZZINESS: ICD-10-CM

## 2022-07-20 DIAGNOSIS — D69.6 THROMBOCYTOPENIA (HCC): ICD-10-CM

## 2022-07-20 DIAGNOSIS — G30.1 LATE ONSET ALZHEIMER'S DISEASE WITHOUT BEHAVIORAL DISTURBANCE (HCC): ICD-10-CM

## 2022-07-20 DIAGNOSIS — F02.80 LATE ONSET ALZHEIMER'S DISEASE WITHOUT BEHAVIORAL DISTURBANCE (HCC): ICD-10-CM

## 2022-07-20 DIAGNOSIS — E87.6 HYPOKALEMIA: Primary | ICD-10-CM

## 2022-07-20 DIAGNOSIS — E55.9 VITAMIN D DEFICIENCY: ICD-10-CM

## 2022-07-20 DIAGNOSIS — R63.4 WEIGHT LOSS: ICD-10-CM

## 2022-07-20 DIAGNOSIS — I10 PRIMARY HYPERTENSION: ICD-10-CM

## 2022-07-20 LAB
A/G RATIO: 1.9 (ref 1.1–2.2)
ALBUMIN SERPL-MCNC: 4.2 G/DL (ref 3.4–5)
ALP BLD-CCNC: 92 U/L (ref 40–129)
ALT SERPL-CCNC: 82 U/L (ref 10–40)
ANION GAP SERPL CALCULATED.3IONS-SCNC: 12 MMOL/L (ref 3–16)
AST SERPL-CCNC: 68 U/L (ref 15–37)
BASOPHILS ABSOLUTE: 0 K/UL (ref 0–0.2)
BASOPHILS RELATIVE PERCENT: 0.4 %
BILIRUB SERPL-MCNC: 1.3 MG/DL (ref 0–1)
BUN BLDV-MCNC: 9 MG/DL (ref 7–20)
CALCIUM SERPL-MCNC: 10.1 MG/DL (ref 8.3–10.6)
CHLORIDE BLD-SCNC: 100 MMOL/L (ref 99–110)
CHOLESTEROL, TOTAL: 163 MG/DL (ref 0–199)
CO2: 31 MMOL/L (ref 21–32)
CREAT SERPL-MCNC: 1.8 MG/DL (ref 0.6–1.2)
EOSINOPHILS ABSOLUTE: 0 K/UL (ref 0–0.6)
EOSINOPHILS RELATIVE PERCENT: 1.4 %
GFR AFRICAN AMERICAN: 32
GFR NON-AFRICAN AMERICAN: 27
GLUCOSE BLD-MCNC: 91 MG/DL (ref 70–99)
HCT VFR BLD CALC: 38.4 % (ref 36–48)
HDLC SERPL-MCNC: 42 MG/DL (ref 40–60)
HEMOGLOBIN: 12.4 G/DL (ref 12–16)
LDL CHOLESTEROL CALCULATED: 102 MG/DL
LYMPHOCYTES ABSOLUTE: 1 K/UL (ref 1–5.1)
LYMPHOCYTES RELATIVE PERCENT: 28.4 %
MCH RBC QN AUTO: 26 PG (ref 26–34)
MCHC RBC AUTO-ENTMCNC: 32.3 G/DL (ref 31–36)
MCV RBC AUTO: 80.3 FL (ref 80–100)
MONOCYTES ABSOLUTE: 0.4 K/UL (ref 0–1.3)
MONOCYTES RELATIVE PERCENT: 12.1 %
NEUTROPHILS ABSOLUTE: 2.1 K/UL (ref 1.7–7.7)
NEUTROPHILS RELATIVE PERCENT: 57.7 %
PDW BLD-RTO: 15.3 % (ref 12.4–15.4)
PLATELET # BLD: 100 K/UL (ref 135–450)
PMV BLD AUTO: 10.5 FL (ref 5–10.5)
POTASSIUM SERPL-SCNC: 2.9 MMOL/L (ref 3.5–5.1)
RBC # BLD: 4.78 M/UL (ref 4–5.2)
SODIUM BLD-SCNC: 143 MMOL/L (ref 136–145)
TOTAL PROTEIN: 6.4 G/DL (ref 6.4–8.2)
TRIGL SERPL-MCNC: 93 MG/DL (ref 0–150)
VITAMIN D 25-HYDROXY: 52.6 NG/ML
VLDLC SERPL CALC-MCNC: 19 MG/DL
WBC # BLD: 3.6 K/UL (ref 4–11)

## 2022-07-20 PROCEDURE — 1123F ACP DISCUSS/DSCN MKR DOCD: CPT | Performed by: NURSE PRACTITIONER

## 2022-07-20 PROCEDURE — 99213 OFFICE O/P EST LOW 20 MIN: CPT | Performed by: NURSE PRACTITIONER

## 2022-07-20 ASSESSMENT — ENCOUNTER SYMPTOMS
GASTROINTESTINAL NEGATIVE: 1
RESPIRATORY NEGATIVE: 1

## 2022-07-20 NOTE — PATIENT INSTRUCTIONS
Take your time when changing positions. Ask for assistance when standing. Continue current medications. Consider nurse aide for every day assistance. Follow up with Dr. Bernardo Tejeda. [facility] Laboratory Locations - No appointment necessary. @ indicates the location is open Saturdays in addition to Monday through Friday. Call your preferred location for test preparation, business hours and other information you need. [employer] accepts [employer]. [facility]    @ [facility] [address] [place], [address]   Ph: [phone] [facility] [address]   Ph: [phone]  @ [place] [facility] [address] [facility]   Ph: [phone]    [facility] S[facility]. [address]   Ph: [phone] @ [place] [facility] [address]  Ph: [phone] @ [place] [facility] [address] [facility]. [address]   Ph: [phone]     [facility] Svcs. [address] [place]  Ph: [phone]    [place]   @ [place] [facility] [address] [place], [place] [address]   Ph: [phone] [facility] [address] [facility], [address] [place] [address]  Ph: [phone]:  [address] Lab Svcs[address] 54 [facility]   Ph: [phone] [facility]. Lab Svcs.   [address] [facility], [facility] [facility]   Ph: [phone]

## 2022-07-20 NOTE — PROGRESS NOTES
Vitamin D deficiency    Low back pain    Muscle cramp    Shoulder pain    Cataract, left    Wrist pain, right / RT THUMB PAIN    Shoulder pain, bilateral    Heart murmur previously undiagnosed    Bilateral shoulder pain    Thrombocytopenia (HCC)    Chronic rhinitis    Bilateral sensorineural hearing loss    Lumbar sprain    Pulmonary nodule/ ABN CT    Vertigo    Fall at home    Loss of balance    Gallstones    Lightheaded    Cognitive decline    Moderate episode of recurrent major depressive disorder ([address]    Late onset Alzheimer's disease without behavioral disturbance (HCC)     Past Medical History:   Diagnosis Date    Allergic rhinitis     Depression     DJD (degenerative joint disease)     Hyperlipidemia     Hypertension     Thrombocytopenia (HCC)       Past Surgical History:   Procedure Laterality Date    BREAST LUMPECTOMY      RT - BENIGN    CATARACT REMOVAL WITH IMPLANT      no implant per pt     HEMORRHOID SURGERY      HYSTERECTOMY (CERVIX STATUS UNKNOWN)         Family History   Problem Relation Age of Onset    High Blood Pressure Mother     High Blood Pressure Sister     Heart Disease Brother     High Blood Pressure Brother     Stroke Paternal Uncle     Cancer Maternal Grandmother         ? FEMALE ORGAN    High Blood Pressure Other         SON    Diabetes Paternal Aunt       Allergies   Allergen Reactions    Nabumetone Swelling     Ankle         Review of Systems   Constitutional: Negative. HENT: Negative. Respiratory: Negative. Cardiovascular: Negative. Gastrointestinal: Negative. Genitourinary: Negative. Musculoskeletal:  Positive for gait problem (uses a cane). Skin: Negative. Neurological:  Positive for dizziness. Dementia   Psychiatric/Behavioral:  Positive for dysphoric mood. Negative for suicidal ideas.       Vitals:    07/20/22 0916   BP: 102/80   Site: Right Upper Arm   Position: Sitting   Cuff Size: Medium Adult   Pulse: 86   SpO2: 96%   Weight: 96 lb 9.6 oz (43.8 kg)     Physical Exam  Constitutional:       General: She is not in acute distress. Appearance: Normal appearance. She is not ill-appearing, toxic-appearing or diaphoretic. Cardiovascular:      Rate and Rhythm: Normal rate and regular rhythm. Pulses: Normal pulses. Heart sounds: Normal heart sounds. No murmur heard. No friction rub. No gallop. Pulmonary:      Effort: Pulmonary effort is normal. No respiratory distress. Breath sounds: Normal breath sounds. No stridor. No wheezing, rhonchi or rales. Abdominal:      Palpations: Abdomen is soft. Musculoskeletal:         General: Normal range of motion. Skin:     General: Skin is warm and dry. Neurological:      Mental Status: She is alert and oriented to person, place, and time. Psychiatric:         Mood and Affect: Mood normal.         Behavior: Behavior normal.         Cognition and Memory: Memory is impaired. Assessment/Plan:  1. Dizziness / Lightheadedness  - Continue current medications. - Complete lab work. - Discussed plan with son. Return in about 1 month (around 8/20/2022).

## 2022-07-21 ENCOUNTER — TELEPHONE (OUTPATIENT)
Dept: INTERNAL MEDICINE CLINIC | Age: 87
End: 2022-07-21

## 2022-07-22 RX ORDER — POTASSIUM CHLORIDE 20 MEQ/1
20 TABLET, EXTENDED RELEASE ORAL DAILY
Qty: 14 TABLET | Refills: 0 | Status: SHIPPED | OUTPATIENT
Start: 2022-07-22

## 2022-07-22 NOTE — TELEPHONE ENCOUNTER
Jade from [employer] [facility] [employer] states that she did not know what the indications were, her papers did not say why. She did notice that the  sodium content was lower than the previous order.   Please advise

## 2022-07-22 NOTE — TELEPHONE ENCOUNTER
CALLED AND DISCUSSED LABS WITH PT    HYPOKALEMIA - START ON [facility] PENDING REPEAT LAB  RENAL INSUFF - ADVISED ON HYDRATION  ABN LFT - NO ETOH USE. MONITOR    F/U REPEAT LAB  MAKE APPT WITHIN 2 WEEKS    CALLED AND D/W PT AND SON  PT / SON VERBALIZED UNDERSTANDING

## 2022-07-25 NOTE — TELEPHONE ENCOUNTER
[facility] on [employer] need a letter stating a low sodium cardiac diet to be sent over via fax.

## 2022-07-27 ENCOUNTER — TELEPHONE (OUTPATIENT)
Dept: INTERNAL MEDICINE CLINIC | Age: 87
End: 2022-07-27

## 2022-08-01 RX ORDER — POTASSIUM CHLORIDE 1500 MG/1
TABLET, EXTENDED RELEASE ORAL
Qty: 14 TABLET | Refills: 0 | OUTPATIENT
Start: 2022-08-01

## 2022-08-05 ENCOUNTER — OFFICE VISIT (OUTPATIENT)
Dept: INTERNAL MEDICINE CLINIC | Age: 87
End: 2022-08-05
Payer: MEDICARE

## 2022-08-05 VITALS
DIASTOLIC BLOOD PRESSURE: 80 MMHG | HEART RATE: 60 BPM | SYSTOLIC BLOOD PRESSURE: 126 MMHG | TEMPERATURE: 97.1 F | BODY MASS INDEX: 16.3 KG/M2 | WEIGHT: 101 LBS | OXYGEN SATURATION: 99 %

## 2022-08-05 DIAGNOSIS — I10 PRIMARY HYPERTENSION: ICD-10-CM

## 2022-08-05 DIAGNOSIS — D69.6 THROMBOCYTOPENIA (HCC): ICD-10-CM

## 2022-08-05 DIAGNOSIS — R42 DIZZINESS: ICD-10-CM

## 2022-08-05 DIAGNOSIS — E78.2 MIXED HYPERLIPIDEMIA: ICD-10-CM

## 2022-08-05 DIAGNOSIS — R79.89 ABNORMAL LFTS: ICD-10-CM

## 2022-08-05 DIAGNOSIS — Z00.00 MEDICARE ANNUAL WELLNESS VISIT, SUBSEQUENT: Primary | ICD-10-CM

## 2022-08-05 DIAGNOSIS — R26.81 UNSTEADY GAIT: ICD-10-CM

## 2022-08-05 DIAGNOSIS — F02.80 LATE ONSET ALZHEIMER'S DISEASE WITHOUT BEHAVIORAL DISTURBANCE (HCC): ICD-10-CM

## 2022-08-05 DIAGNOSIS — G30.1 LATE ONSET ALZHEIMER'S DISEASE WITHOUT BEHAVIORAL DISTURBANCE (HCC): ICD-10-CM

## 2022-08-05 DIAGNOSIS — N28.9 RENAL INSUFFICIENCY: ICD-10-CM

## 2022-08-05 DIAGNOSIS — E55.9 VITAMIN D DEFICIENCY: ICD-10-CM

## 2022-08-05 DIAGNOSIS — E87.6 HYPOKALEMIA: ICD-10-CM

## 2022-08-05 DIAGNOSIS — F33.1 MODERATE EPISODE OF RECURRENT MAJOR DEPRESSIVE DISORDER (HCC): ICD-10-CM

## 2022-08-05 LAB
BACTERIA: NORMAL /HPF
BILIRUBIN URINE: NEGATIVE
BLOOD, URINE: ABNORMAL
CLARITY: CLEAR
COLOR: YELLOW
COMMENT UA: NORMAL
EPITHELIAL CELLS, UA: 2 /HPF (ref 0–5)
GLUCOSE URINE: NEGATIVE MG/DL
HYALINE CASTS: 4 /LPF (ref 0–8)
KETONES, URINE: NEGATIVE MG/DL
LEUKOCYTE ESTERASE, URINE: ABNORMAL
MICROSCOPIC EXAMINATION: YES
NITRITE, URINE: NEGATIVE
PH UA: 7 (ref 5–8)
PROTEIN UA: 100 MG/DL
RBC UA: NORMAL /HPF (ref 0–4)
SPECIFIC GRAVITY UA: 1.02 (ref 1–1.03)
URINE REFLEX TO CULTURE: ABNORMAL
URINE TYPE: ABNORMAL
UROBILINOGEN, URINE: 1 E.U./DL
WBC UA: 1 /HPF (ref 0–5)

## 2022-08-05 PROCEDURE — 1123F ACP DISCUSS/DSCN MKR DOCD: CPT | Performed by: INTERNAL MEDICINE

## 2022-08-05 PROCEDURE — G0439 PPPS, SUBSEQ VISIT: HCPCS | Performed by: INTERNAL MEDICINE

## 2022-08-05 RX ORDER — MEMANTINE HYDROCHLORIDE 7 MG/1
7 CAPSULE, EXTENDED RELEASE ORAL DAILY
Qty: 90 CAPSULE | Refills: 0 | Status: SHIPPED | OUTPATIENT
Start: 2022-08-05

## 2022-08-05 RX ORDER — AMLODIPINE BESYLATE 5 MG/1
5 TABLET ORAL DAILY
Qty: 90 TABLET | Refills: 0 | Status: SHIPPED | OUTPATIENT
Start: 2022-08-05 | End: 2022-10-04 | Stop reason: SDUPTHER

## 2022-08-05 RX ORDER — DONEPEZIL HYDROCHLORIDE 10 MG/1
TABLET, FILM COATED ORAL
Qty: 90 TABLET | Refills: 0 | Status: SHIPPED | OUTPATIENT
Start: 2022-08-05

## 2022-08-05 RX ORDER — TELMISARTAN 40 MG/1
40 TABLET ORAL DAILY
Qty: 90 TABLET | Refills: 0 | Status: SHIPPED | OUTPATIENT
Start: 2022-08-05 | End: 2022-10-04 | Stop reason: SDUPTHER

## 2022-08-05 RX ORDER — MULTIVIT-MIN/IRON/FOLIC ACID/K 18-600-40
1 CAPSULE ORAL DAILY
Qty: 90 CAPSULE | Refills: 0 | Status: SHIPPED | OUTPATIENT
Start: 2022-08-05

## 2022-08-05 ASSESSMENT — PATIENT HEALTH QUESTIONNAIRE - PHQ9
9. THOUGHTS THAT YOU WOULD BE BETTER OFF DEAD, OR OF HURTING YOURSELF: 0
SUM OF ALL RESPONSES TO PHQ QUESTIONS 1-9: 3
10. IF YOU CHECKED OFF ANY PROBLEMS, HOW DIFFICULT HAVE THESE PROBLEMS MADE IT FOR YOU TO DO YOUR WORK, TAKE CARE OF THINGS AT HOME, OR GET ALONG WITH OTHER PEOPLE: 0
SUM OF ALL RESPONSES TO PHQ9 QUESTIONS 1 & 2: 3
4. FEELING TIRED OR HAVING LITTLE ENERGY: 0
7. TROUBLE CONCENTRATING ON THINGS, SUCH AS READING THE NEWSPAPER OR WATCHING TELEVISION: 0
3. TROUBLE FALLING OR STAYING ASLEEP: 0
2. FEELING DOWN, DEPRESSED OR HOPELESS: 0
1. LITTLE INTEREST OR PLEASURE IN DOING THINGS: 3
5. POOR APPETITE OR OVEREATING: 0
8. MOVING OR SPEAKING SO SLOWLY THAT OTHER PEOPLE COULD HAVE NOTICED. OR THE OPPOSITE, BEING SO FIGETY OR RESTLESS THAT YOU HAVE BEEN MOVING AROUND A LOT MORE THAN USUAL: 0
SUM OF ALL RESPONSES TO PHQ QUESTIONS 1-9: 3
SUM OF ALL RESPONSES TO PHQ QUESTIONS 1-9: 3
6. FEELING BAD ABOUT YOURSELF - OR THAT YOU ARE A FAILURE OR HAVE LET YOURSELF OR YOUR FAMILY DOWN: 0
SUM OF ALL RESPONSES TO PHQ QUESTIONS 1-9: 3

## 2022-08-05 ASSESSMENT — LIFESTYLE VARIABLES: HOW OFTEN DO YOU HAVE A DRINK CONTAINING ALCOHOL: NEVER

## 2022-08-05 NOTE — PATIENT INSTRUCTIONS
TAKE MED AS ADVISED    DIET/ EXERCISE. FOLLOW UP WITHIN 6 WEEKS / AS NEEDED    FOLLOW UP FOR LABS, MRI, PHYSICAL THERAPY      Personalized Preventive Plan for Maria Del Carmen Hahn - 8/5/2022  Medicare offers a range of preventive health benefits. Some of the tests and screenings are paid in full while other may be subject to a deductible, co-insurance, and/or copay. Some of these benefits include a comprehensive review of your medical history including lifestyle, illnesses that may run in your family, and various assessments and screenings as appropriate. After reviewing your medical record and screening and assessments performed today your provider may have ordered immunizations, labs, imaging, and/or referrals for you. A list of these orders (if applicable) as well as your Preventive Care list are included within your After Visit Summary for your review. Other Preventive Recommendations:    A preventive eye exam performed by an eye specialist is recommended every 1-2 years to screen for glaucoma; cataracts, macular degeneration, and other eye disorders. A preventive dental visit is recommended every 6 months. Try to get at least 150 minutes of exercise per week or 10,000 steps per day on a pedometer . Order or download the FREE \"Exercise & Physical Activity: Your Everyday Guide\" from [employer]. Call 6-577.314.5623 or search [employer] online. You need 9207-8443 mg of calcium and 4688-1647 IU of vitamin D per day. It is possible to meet your calcium requirement with diet alone, but a vitamin D supplement is usually necessary to meet this goal.  When exposed to the sun, use a sunscreen that protects against both UVA and UVB radiation with an SPF of 30 or greater. Reapply every 2 to 3 hours or after sweating, drying off with a towel, or swimming. Always wear a seat belt when traveling in a car. Always wear a helmet when riding a bicycle or motorcycle.

## 2022-08-05 NOTE — PROGRESS NOTES
Medicare Annual Wellness Visit    Sudha Fuentes is here for Medicare AWV and Other    PT HERE WITH SON       Recommendations for Preventive Services Due: see orders and patient instructions/AVS.  Recommended screening schedule for the next 5-10 years is provided to the patient in written form: see Patient Instructions/AVS.     Return in 6 weeks (on 9/16/2022) for Medicare Annual Wellness Visit in 1 year. Subjective     LAST PHYSICAL > 1 R    The following acute and/or chronic problems were also addressed today:    DEMENTIA - ? MED COMPLIANCE. STILL FORGETFUL PER SON  HTN -  TAKING MEDS. BP NOTED. ? DIET / EXERCISE COMPLIANCE. NO HEADACHE , + DIZZINESS. HLP - TAKING  MED . + EXERCISE / DIET  COMPLIANCE .  ? MUSCLE CRAMPS / NO MUSCLE ACHES. PREVIOUS  - LAB D/W PT  DIZZINESS - PERSISTENT. + LIGHTHEADEDNESS, NO VERTIGO, NO TINNITUS, NO HEARING LOSS. DEPRESSION - TAKING MED. NO INSOMNIA . DENIES SUICIDAL / NO HOMICIDAL THOUGHTS / IDEATION  THROMBOCYTOPENIA - CHRONIC. DENIES BRUISING. VIT D DEF -  ? MED COMPLIANCE. PREVIOUS LABS D/W PT  HYPOK+ - TAKING MED. DENIES MUSCLE CRAMPS. LABS D/W PT / SON  RENAL INSUFF - LAB D/W PT  ABN LFT - LAB D/W PT        DENIES CP, No SOB, No PALPITATIONS, No COUGH, NO F/C  No ABD PAIN, NO NAUSEA, NO VOMITING, No DIARRHEA, No CONSTIPATION, No MELENA, No HEMATOCHEZIA  No DYSURIA, No URI. FREQ, No URGENCY, No HEMATURIA      ROS: COMPREHENSIVE ROS AS IN HX, REST -VE  History obtained from child, chart review, and the patient      Patient's complete Health Risk Assessment and screening values have been reviewed and are found in Flowsheets. The following problems were reviewed today and where indicated follow up appointments were made and/or referrals ordered.     Positive Risk Factor Screenings with Interventions:    Fall Risk:  Do you feel unsteady or are you worried about falling? : (!) yes  2 or more falls in past year?: no  Fall with injury in past year?: no   Fall Risk Interventions:    Home safety tips provided  Home exercises provided to promote strength and balance    Cognitive: Words recalled: 1 Word Recalled  Clock Drawing Test (CDT): (!) Abnormal  Total Score Interpretation: Abnormal Mini-Cog  Did the patient refuse to take the cognition test?: No  Cognitive Impairment Interventions:  Patient advised to follow-up in this office for further evaluation and treatment within 2 month(s)           General Health and ACP:  General  In general, how would you say your health is?: Fair  In the past 7 days, have you experienced any of the following: New or Increased Pain, New or Increased Fatigue, Loneliness, Social Isolation, Stress or Anger?: (!) Yes  Select all that apply: (!) New or Increased Fatigue  Do you get the social and emotional support that you need?: Yes  Do you have a Living Will?: Yes    Advance Directives       Power of  Living Will ACP-Advance Directive ACP-Power of Fabienne Keyonna on 03/23/21 Filed on 03/24/21 Filed [id number] Risk Interventions:  Fatigue: regular exercise recommended- 3-5 times per week, 30-45 minutes per session    Health Habits/Nutrition:  Physical Activity: Inactive    Days of Exercise per Week: 0 days    Minutes of Exercise per Session: 0 min     Have you lost any weight without trying in the past 3 months?: (!) Yes     Have you seen the dentist within the past year?: (!) No  Health Habits/Nutrition Interventions:  Dental exam overdue:  patient encouraged to make appointment with his/her dentist  . Grayson Rangel on diet        Hearing/Vision:  Do you or your family notice any trouble with your hearing that hasn't been managed with hearing aids?: No  Do you have difficulty driving, watching TV, or doing any of your daily activities because of your eyesight?: No  Have you had an eye exam within the past year?: (!) No  No results found.   Hearing/Vision Interventions:  Vision concerns:  patient encouraged to make appointment with his/her eye specialist            Objective   Vitals:    08/05/22 1318   BP: 106/80   Site: Left Upper Arm   Position: Sitting   Cuff Size: Medium Adult   Pulse: 60   Temp: 97.1 °F (36.2 °C)   SpO2: 99%   Weight: 101 lb (45.8 kg)             Allergies   Allergen Reactions    Nabumetone Swelling     Ankle     Prior to Visit Medications    Medication Sig Taking? Authorizing Provider   potassium chloride (KLOR-CON M) 20 MEQ extended release tablet Take 1 tablet by mouth in the morning.  Yes Kylie Luu MD   rosuvastatin (CRESTOR) 40 MG tablet TAKE ONE TABLET BY MOUTH ONCE NIGHTLY Yes NEPTALI Becker   donepezil (ARICEPT) 10 MG tablet TAKE ONE TABLET BY MOUTH ONCE NIGHTLY Yes NEPTALI Becker   amLODIPine (NORVASC) 5 MG tablet Take 1 tablet by mouth daily Yes Kylie Luu MD   telmisartan (MICARDIS) 40 MG tablet Take 1 tablet by mouth daily Yes Kylie Luu MD   Cholecalciferol (VITAMIN D) 50 MCG (2000 UT) CAPS capsule Take 1 capsule by mouth daily Yes Kylie Luu MD   Multiple Vitamins-Minerals (THERAPEUTIC MULTIVITAMIN-MINERALS) tablet Take 1 tablet by mouth daily Yes Kylie Luu MD   memantine ER (NAMENDA XR) 7 MG CP24 extended release capsule Take 1 capsule by mouth daily Yes Kylie Luu MD   dextran 70-hypromellose (TEARS PURE) 0.1-0.3 % SOLN opthalmic solution Place 1 drop into both eyes as needed (dry eyes) Yes Historical Provider, MD   escitalopram (LEXAPRO) 10 MG tablet Take 10 mg by mouth daily  Yes Historical Provider, MD   aspirin 81 MG tablet Take 81 mg by mouth daily Yes Historical Provider, MD   Nutritional Supplements (ENSURE PLUS HN) LIQD Take 1 Can by mouth 3 times daily  Kylie Luu MD       [facility] (Including outside providers/suppliers regularly involved in providing care):   Patient Care Team:  Kylie Luu MD as PCP - General (Internal Medicine)  Kylie Luu MD as PCP - [facility] Empaneled Provider  Simone Farha RN as Ambulatory Care Manager     Reviewed and updated this visit:  Tobacco  Allergies  Meds  Problems  Med Hx  Surg Hx  Soc Hx  Fam Hx               OBJECTIVE:   NURSING NOTE AND VITALS REVIEWED  /80 (Site: Left Upper Arm, Position: Sitting, Cuff Size: Medium Adult)   Pulse 60   Temp 97.1 °F (36.2 °C)   Wt 101 lb (45.8 kg)   SpO2 99%   BMI 16.30 kg/m²     NO ACUTE DISTRESS    REPEAT BP: 126/80 (DELPHINE), NO ORTHOSTASIS     Body mass index is 16.3 kg/m². HEENT: NO PALLOR, ANICTERIC, PERRLA, EOMI, NO CONJUNCTIVAL ERYTHEMA,                 NO SINUS TENDERNESS, + CERUMEN  DELPHINE - NOT CERUMEN IMPACTION, NO TM ERYTHEMA  NECK:  SUPPLE, TRACHEA MIDLINE, NT, NO JVD, NO CB, NO LA, NO TM, NO STIFFNESS  CHEST: RESPY EFFORT NL, GOOD AE, NO W/R/C  HEART: S1S2+ REG, NO M/G/R  ABD: SOFT, NT, NO HSM, BS+  EXT: NO EDEMA, NT, PULSES +. TWAN'S -VE  NEURO: ALERT AND ORIENTED X 2 - CONFUSED WITH DATE, NO MENINGEAL SIGNS, NO TREMORS, UNSTEADY GAIT NOTED,  NO NEW FOCAL DEFICITS  PSYCH: FAIRLY GOOD AFFECT. RECALL 1/3. 2/3 WITH ASSISTANCE  BACK: NT, NO ROM, NO CVA TENDERNESS  SKIN: NO BRUISING     PREVIOUS LABS REVIEWED AND D/W PT    ASSESSMENT / PLAN:     Diagnosis Orders   1. Medicare annual wellness visit, subsequent  COUNSELLED. HEALTH / SAFETY EDUCATION REVIEWED AND ADVISED  HEALTH MAINTENANCE UPDATED  F/U LABS   IMMUNIZATION REVIEWED -  READDRESS  ADVISED ON SAFETY / FALL PRECAUTIONS  REFER PHY. THERAPY FOR BALANCE / STRENGTH EXERCISES, ADVISED ON DENTAL, EYE EXAM, DIET  MAKE CHANGES AS NEEDED. 2. Late onset Alzheimer's disease without behavioral disturbance ([facility]  COUNSELLED. DEFERRED MED CHANGE  CONTINUE ARICEPT AND NAMENDA XR  D/W PT AND SON - ADVISED ON SUPERVISED CARE. ADVISED ENCOURAGE INTERACTIVE ACTIVITY  MRI, LABS FOR FURTHER EVAL  MAKE CHANGES AS NEEDED. 3. Primary hypertension  COUNSELLED. CONTINUE MEDS. LOW NA+ / DASH DIET/ EXERCISE. MONITOR.  GOAL </= 130/80  MONITOR FOR HYPOTENSION, BRADYCARDIA  F/U LABS  MAKE CHANGES AS NEEDED. 4. Mixed hyperlipidemia  COUNSELLED. ADVISED LOW FAT / CHOL DIET/ EXERCISE.  MONITOR ON MED. F/U LAB. GOALS D/W PT.  MAKE CHANGES AS NEEDED. 5. Dizziness / Lightheadedness  COUNSELLED. PERSISTENT. LABS TO EVAL. GRADUAL POSITION CHANGE. MAINTAIN HYDRATION. MRI TO FURTHER EVAL  F/U PY. THERAPY  MONITOR. MAKE CHANGES AS NEEDED. 6. Moderate episode of recurrent major depressive disorder ([facility]  COUNSELLED. MONITOR ON MED PER MH  PT COUNSELLED  ON RELAXATION TECHNIQUES. ADDRESSED STRESS MGT. MONITOR  PT NOT SUICIDAL/ NOT HOMICIDAL  MAKE CHANGES AS NEEDED. 7. Thrombocytopenia ([facility]  COUNSELLED. CHRONIC ASYMPTOMATIC. MONITOR  MAKE CHANGES AS NEEDED. 8. Vitamin D deficiency  COUNSELLED. MONITOR ON VIT D SUPPLEMENT. MAKE CHANGES AS NEEDED. 9. Hypokalemia  COUNSELLED. MONITOR ON MED. ADVISED FOODS HIGH IN K+. MONITOR LABS   MAKE CHANGES AS NEEDED       10. Renal insufficiency  COUNSELLED. ADVISED AVOID NSAIDS. MAINTAIN HYDRATION. MONITOR. MAKE CHANGES AS NEEDED. 11. Abnormal LFTs  COUNSELLED. LABS TO REEVAL  READDRESS MED CHANGE - HOLD STATIN IF WORSENING  MAKE CHANGES AS NEEDED. 12. Unsteady gait  COUNSELLED. LABS, MRI TO EVAL  F/U THERAPY - EVAL AND TREAT. MONITOR  READDRESSED FALL PRECAUTIONS  MAKE CHANGES AS NEEDED.                          MEDICATION SIDE EFFECTS D/W PATIENT    RETURN TO CLINIC WITHIN 6 WEEKS / PRN    FOLLOW UP FOR LABS, MRI, PHYSICAL THERAPY

## 2022-08-06 LAB
A/G RATIO: 2 (ref 1.1–2.2)
ALBUMIN SERPL-MCNC: 4.3 G/DL (ref 3.4–5)
ALP BLD-CCNC: 67 U/L (ref 40–129)
ALT SERPL-CCNC: 12 U/L (ref 10–40)
ANION GAP SERPL CALCULATED.3IONS-SCNC: 10 MMOL/L (ref 3–16)
AST SERPL-CCNC: 19 U/L (ref 15–37)
BILIRUB SERPL-MCNC: 1.2 MG/DL (ref 0–1)
BUN BLDV-MCNC: 17 MG/DL (ref 7–20)
CALCIUM SERPL-MCNC: 10.1 MG/DL (ref 8.3–10.6)
CHLORIDE BLD-SCNC: 103 MMOL/L (ref 99–110)
CO2: 27 MMOL/L (ref 21–32)
CREAT SERPL-MCNC: 1.7 MG/DL (ref 0.6–1.2)
GFR AFRICAN AMERICAN: 34
GFR NON-AFRICAN AMERICAN: 28
GLUCOSE BLD-MCNC: 98 MG/DL (ref 70–99)
POTASSIUM SERPL-SCNC: 4.5 MMOL/L (ref 3.5–5.1)
SODIUM BLD-SCNC: 140 MMOL/L (ref 136–145)
TOTAL PROTEIN: 6.5 G/DL (ref 6.4–8.2)

## 2022-08-11 ENCOUNTER — CARE COORDINATION (OUTPATIENT)
Dept: CARE COORDINATION | Age: 87
End: 2022-08-11

## 2022-08-11 ENCOUNTER — HOSPITAL ENCOUNTER (OUTPATIENT)
Dept: MRI IMAGING | Age: 87
Discharge: HOME | End: 2022-08-11

## 2022-08-11 DIAGNOSIS — R42 DIZZINESS: ICD-10-CM

## 2022-08-11 DIAGNOSIS — R26.81 UNSTEADY GAIT: ICD-10-CM

## 2022-08-11 DIAGNOSIS — F02.80 LATE ONSET ALZHEIMER'S DISEASE WITHOUT BEHAVIORAL DISTURBANCE (HCC): ICD-10-CM

## 2022-08-11 DIAGNOSIS — G30.1 LATE ONSET ALZHEIMER'S DISEASE WITHOUT BEHAVIORAL DISTURBANCE (HCC): ICD-10-CM

## 2022-08-26 ENCOUNTER — CARE COORDINATION (OUTPATIENT)
Dept: CARE COORDINATION | Age: 87
End: 2022-08-26

## 2022-08-26 NOTE — CARE COORDINATION
ACM attempted out reach call , to follow up on status and assess for ongoing care coordination needs, no answer to pt phone will attempt again at later date

## 2022-09-13 ENCOUNTER — CARE COORDINATION (OUTPATIENT)
Dept: CARE COORDINATION | Age: 87
End: 2022-09-13

## 2022-09-13 NOTE — CARE COORDINATION
Ambulatory Care Coordination Note  9/13/2022    ACC: Sahra Harding, RN    Summary Note:   Acm out reach call placed to pt to assess ongoing care coordination needs. Spoke with pt son . Reports pt is doing fairly well has some good and bad days, states family checks on her regularly. He and uncle take care of getting her groceries and supplies and her sister who is a Rn takes care of her meds. Denied any current resource needs at this time, encouraged to reach out to writer with any needs    Lab Results       None            Care Coordination Interventions    Referral from Primary Care Provider: No  Suggested Interventions and Community Resources  Disease Association: In Process (Comment: Dementia)  Home Health Services: Completed (Comment: 4/28/22-Spoke with Rutledge Daniele and they will be out tomorrow/[facility])          Goals Addressed    None         Prior to Admission medications    Medication Sig Start Date End Date Taking? Authorizing Provider   donepezil (ARICEPT) 10 MG tablet TAKE ONE TABLET BY MOUTH ONCE NIGHTLY 8/5/22   Lino Grimm MD   amLODIPine (NORVASC) 5 MG tablet Take 1 tablet by mouth in the morning. 8/5/22   Lino Grimm MD   telmisartan (MICARDIS) 40 MG tablet Take 1 tablet by mouth in the morning. 8/5/22   Lino Girmm MD   Cholecalciferol (VITAMIN D) 50 MCG (2000 UT) CAPS capsule Take 1 capsule by mouth in the morning. 8/5/22   Lino Grimm MD   memantine ER (NAMENDA XR) 7 MG CP24 extended release capsule Take 1 capsule by mouth in the morning.  8/5/22   Lino Grimm MD   potassium chloride (KLOR-CON M) 20 MEQ extended release tablet Take 1 tablet by mouth in the morning. 7/22/22   Lino Grimm MD   rosuvastatin (CRESTOR) 40 MG tablet TAKE ONE TABLET BY MOUTH ONCE NIGHTLY 7/13/22   NEPTALI Rodriguez   Multiple Vitamins-Minerals (THERAPEUTIC MULTIVITAMIN-MINERALS) tablet Take 1 tablet by mouth daily 5/25/22 5/25/23  Lino Grimm MD   Nutritional Supplements (ENSURE PLUS HN) LIQD Take 1 Can by mouth 3 times daily 5/25/22 6/24/22  Geronimo Hamman, MD   dextran 70-hypromellose (TEARS PURE) 0.1-0.3 % SOLN opthalmic solution Place 1 drop into both eyes as needed (dry eyes)    Historical Provider, MD   escitalopram (LEXAPRO) 10 MG tablet Take 10 mg by mouth daily     Historical Provider, MD   aspirin 81 MG tablet Take 81 mg by mouth daily    Historical Provider, MD       No future appointments.

## 2022-10-04 ENCOUNTER — TELEPHONE (OUTPATIENT)
Dept: INTERNAL MEDICINE CLINIC | Age: 87
End: 2022-10-04

## 2022-10-04 DIAGNOSIS — G30.1 LATE ONSET ALZHEIMER'S DISEASE WITHOUT BEHAVIORAL DISTURBANCE (HCC): ICD-10-CM

## 2022-10-04 DIAGNOSIS — E55.9 VITAMIN D DEFICIENCY: ICD-10-CM

## 2022-10-04 DIAGNOSIS — F02.80 LATE ONSET ALZHEIMER'S DISEASE WITHOUT BEHAVIORAL DISTURBANCE (HCC): ICD-10-CM

## 2022-10-04 DIAGNOSIS — I10 PRIMARY HYPERTENSION: ICD-10-CM

## 2022-10-04 RX ORDER — MULTIVIT-MIN/IRON/FOLIC ACID/K 18-600-40
1 CAPSULE ORAL DAILY
Qty: 90 CAPSULE | Refills: 0 | Status: CANCELLED | OUTPATIENT
Start: 2022-10-04

## 2022-10-04 RX ORDER — MEMANTINE HYDROCHLORIDE 7 MG/1
7 CAPSULE, EXTENDED RELEASE ORAL DAILY
Qty: 90 CAPSULE | Refills: 0 | Status: CANCELLED | OUTPATIENT
Start: 2022-10-04

## 2022-10-04 RX ORDER — TELMISARTAN 40 MG/1
40 TABLET ORAL DAILY
Qty: 90 TABLET | Refills: 0 | Status: SHIPPED | OUTPATIENT
Start: 2022-10-04

## 2022-10-04 RX ORDER — DONEPEZIL HYDROCHLORIDE 10 MG/1
TABLET, FILM COATED ORAL
Qty: 90 TABLET | Refills: 0 | Status: CANCELLED | OUTPATIENT
Start: 2022-10-04

## 2022-10-04 RX ORDER — AMLODIPINE BESYLATE 5 MG/1
5 TABLET ORAL DAILY
Qty: 90 TABLET | Refills: 0 | Status: SHIPPED | OUTPATIENT
Start: 2022-10-04

## 2022-10-04 NOTE — TELEPHONE ENCOUNTER
Patient needs refills on   donepezil 10 mg  Amlodipine 5 mg  Telmisartan 40 mg  Cholecalciferol 50 mcg   Memantine ER 7 mg  Please advise    Please call when order is sent.

## 2022-10-04 NOTE — TELEPHONE ENCOUNTER
----- Message from Hieu [address] sent at 10/4/2022 12:48 PM EDT -----  Subject: Refill Request    QUESTIONS  Name of Medication? Other - Blood pressure med (Patient unsure name,   states you know what it is  Patient-reported dosage and instructions? Twice a day  How many days do you have left? 0  Preferred Pharmacy? [facility] [id number]  Pharmacy phone number (if available)? [phone]  ---------------------------------------------------------------------------  --------------  Zenaida ELIZABETH  What is the best way for the office to contact you? OK to leave message on   voicemail  Preferred Call Back Phone Number? [phone]  ---------------------------------------------------------------------------  --------------  SCRIPT ANSWERS  Relationship to Patient?  Self

## 2022-10-04 NOTE — TELEPHONE ENCOUNTER
Medication:   Requested Prescriptions     Pending Prescriptions Disp Refills    donepezil (ARICEPT) 10 MG tablet 90 tablet 0     Sig: TAKE ONE TABLET BY MOUTH ONCE NIGHTLY    amLODIPine (NORVASC) 5 MG tablet 90 tablet 0     Sig: Take 1 tablet by mouth daily    telmisartan (MICARDIS) 40 MG tablet 90 tablet 0     Sig: Take 1 tablet by mouth daily    Cholecalciferol (VITAMIN D) 50 MCG (2000 UT) CAPS capsule 90 capsule 0     Sig: Take 1 capsule by mouth daily    memantine ER (NAMENDA XR) 7 MG CP24 extended release capsule 90 capsule 0     Sig: Take 1 capsule by mouth daily        Last Filled:  8/5/22    Patient Phone Number: [phone] (home) [phone] (work)    Last appt: 8/5/2022   Next appt: Visit date not found    Last OARRS: No flowsheet data found.

## 2022-10-27 ENCOUNTER — TELEPHONE (OUTPATIENT)
Dept: INTERNAL MEDICINE CLINIC | Age: 87
End: 2022-10-27

## 2022-10-27 NOTE — TELEPHONE ENCOUNTER
Medication:   Requested Prescriptions     Pending Prescriptions Disp Refills    escitalopram (LEXAPRO) 10 MG tablet 30 tablet      Sig: Take 1 tablet by mouth daily Take 10 mg by mouth daily        Last Filled:      Patient Phone Number: [phone] (home) [phone] (work)    Last appt: 8/5/2022   Next appt: Visit date not found    Last OARRS: No flowsheet data found.

## 2022-10-28 RX ORDER — ESCITALOPRAM OXALATE 10 MG/1
10 TABLET ORAL DAILY
Qty: 30 TABLET | OUTPATIENT
Start: 2022-10-28

## 2022-10-31 ENCOUNTER — CARE COORDINATION (OUTPATIENT)
Dept: CARE COORDINATION | Age: 87
End: 2022-10-31

## 2022-10-31 NOTE — TELEPHONE ENCOUNTER
Patients son called in to check status of med refill, I informed him of what the said and his going to contact the pharmacy to find out who prescribed this medication for her.   JOHN

## 2022-11-03 ENCOUNTER — TELEPHONE (OUTPATIENT)
Dept: INTERNAL MEDICINE CLINIC | Age: 87
End: 2022-11-03

## 2022-11-03 RX ORDER — ESCITALOPRAM OXALATE 10 MG/1
10 TABLET ORAL DAILY
Qty: 30 TABLET | Refills: 2 | Status: SHIPPED | OUTPATIENT
Start: 2022-11-03

## 2022-11-03 NOTE — TELEPHONE ENCOUNTER
Patients son contacted the doc office that prescribed the Lexapro 10 mg and that office has since closed down and now he has no where to take her to get this medication refill. He states that while she is off this medication she is going down hill. He wants to know if you would be able to fill this prescription.   Please advise

## 2022-11-25 DIAGNOSIS — F02.80 LATE ONSET ALZHEIMER'S DISEASE WITHOUT BEHAVIORAL DISTURBANCE (HCC): ICD-10-CM

## 2022-11-25 DIAGNOSIS — E78.2 MIXED HYPERLIPIDEMIA: ICD-10-CM

## 2022-11-25 DIAGNOSIS — G30.1 LATE ONSET ALZHEIMER'S DISEASE WITHOUT BEHAVIORAL DISTURBANCE (HCC): ICD-10-CM

## 2022-11-28 RX ORDER — ROSUVASTATIN CALCIUM 40 MG/1
TABLET, COATED ORAL
Qty: 30 TABLET | Refills: 0 | Status: SHIPPED | OUTPATIENT
Start: 2022-11-28

## 2022-11-28 RX ORDER — MEMANTINE HYDROCHLORIDE 7 MG/1
CAPSULE, EXTENDED RELEASE ORAL
Qty: 30 CAPSULE | Refills: 0 | Status: SHIPPED | OUTPATIENT
Start: 2022-11-28

## 2022-11-28 NOTE — TELEPHONE ENCOUNTER
Medication:   Requested Prescriptions     Pending Prescriptions Disp Refills    rosuvastatin (CRESTOR) 40 MG tablet [Pharmacy Med Name: ROSUVASTATIN CALCIUM 40 MG TAB] 30 tablet 0     Sig: TAKE ONE TABLET BY MOUTH ONCE NIGHTLY        Last Filled: 07/13/22    Patient Phone Number: [phone] (home) [phone] (work)    Last appt: 8/5/2022   Next appt: no future appointment scheduled

## 2022-11-28 NOTE — TELEPHONE ENCOUNTER
Medication:   Requested Prescriptions     Pending Prescriptions Disp Refills    memantine ER (NAMENDA XR) 7 MG CP24 extended release capsule [Pharmacy Med Name: MEMANTINE HCL ER 7 MG CAPSULE] 29 capsule      Sig: TAKE ONE CAPSULE BY MOUTH EVERY MORNING        Last Filled: 08/05/22     Patient Phone Number: [phone] (home) [phone] (work)    Last appt: 8/5/2022   Next appt: 11/25/2022

## 2023-01-11 DIAGNOSIS — E78.2 MIXED HYPERLIPIDEMIA: ICD-10-CM

## 2023-01-11 NOTE — TELEPHONE ENCOUNTER
Medication:   Requested Prescriptions     Pending Prescriptions Disp Refills    rosuvastatin (CRESTOR) 40 MG tablet 30 tablet 0     Sig: TAKE ONE TABLET BY MOUTH ONCE NIGHTLY        Last Filled:  11/28/22    Patient Phone Number: [phone] (home) [phone] (work)    Last appt: 8/5/2022   Next appt: Visit date not found    Last OARRS: No flowsheet data found.

## 2023-01-12 RX ORDER — ROSUVASTATIN CALCIUM 40 MG/1
TABLET, COATED ORAL
Qty: 30 TABLET | Refills: 0 | Status: SHIPPED | OUTPATIENT
Start: 2023-01-12

## 2023-01-17 DIAGNOSIS — F02.80 LATE ONSET ALZHEIMER'S DISEASE WITHOUT BEHAVIORAL DISTURBANCE (HCC): ICD-10-CM

## 2023-01-17 DIAGNOSIS — G30.1 LATE ONSET ALZHEIMER'S DISEASE WITHOUT BEHAVIORAL DISTURBANCE (HCC): ICD-10-CM

## 2023-01-17 NOTE — TELEPHONE ENCOUNTER
Medication:   Requested Prescriptions     Pending Prescriptions Disp Refills    donepezil (ARICEPT) 10 MG tablet [Pharmacy Med Name: DONEPEZIL HCL 10 MG TABLET] 90 tablet 0     Sig: TAKE ONE TABLET BY MOUTH ONCE NIGHTLY        Last Filled:  10/19/2022    Patient Phone Number: [phone] (home) [phone] (work)    Last appt: 8/5/2022   Next appt: Visit date not found    Last OARRS: No flowsheet data found.

## 2023-01-20 RX ORDER — DONEPEZIL HYDROCHLORIDE 10 MG/1
TABLET, FILM COATED ORAL
Qty: 30 TABLET | Refills: 0 | Status: SHIPPED | OUTPATIENT
Start: 2023-01-20 | End: 2023-03-13 | Stop reason: SDUPTHER

## 2023-01-29 DIAGNOSIS — F02.80 LATE ONSET ALZHEIMER'S DISEASE WITHOUT BEHAVIORAL DISTURBANCE (HCC): ICD-10-CM

## 2023-01-29 DIAGNOSIS — G30.1 LATE ONSET ALZHEIMER'S DISEASE WITHOUT BEHAVIORAL DISTURBANCE (HCC): ICD-10-CM

## 2023-01-30 NOTE — TELEPHONE ENCOUNTER
Medication:   Requested Prescriptions     Pending Prescriptions Disp Refills    memantine ER (NAMENDA XR) 7 MG CP24 extended release capsule [Pharmacy Med Name: MEMANTINE HCL ER 7 MG CAPSULE] 30 capsule 0     Sig: TAKE ONE CAPSULE BY MOUTH DAILY        Last Filled: 11/28/22    Patient Phone Number: [phone] (home) [phone] (work)    Last appt: 8/5/2022   Next appt: no future appointment scheduled

## 2023-01-31 RX ORDER — MEMANTINE HYDROCHLORIDE 7 MG/1
CAPSULE, EXTENDED RELEASE ORAL
Qty: 14 CAPSULE | Refills: 0 | Status: SHIPPED | OUTPATIENT
Start: 2023-01-31

## 2023-02-20 ENCOUNTER — CARE COORDINATION (OUTPATIENT)
Dept: CARE COORDINATION | Age: 88
End: 2023-02-20

## 2023-02-24 DIAGNOSIS — F02.80 LATE ONSET ALZHEIMER'S DISEASE WITHOUT BEHAVIORAL DISTURBANCE (HCC): ICD-10-CM

## 2023-02-24 DIAGNOSIS — G30.1 LATE ONSET ALZHEIMER'S DISEASE WITHOUT BEHAVIORAL DISTURBANCE (HCC): ICD-10-CM

## 2023-02-24 RX ORDER — ESCITALOPRAM OXALATE 10 MG/1
TABLET ORAL
Qty: 10 TABLET | Refills: 0 | OUTPATIENT
Start: 2023-02-24

## 2023-02-24 RX ORDER — MEMANTINE HYDROCHLORIDE 7 MG/1
CAPSULE, EXTENDED RELEASE ORAL
Qty: 14 CAPSULE | Refills: 0 | OUTPATIENT
Start: 2023-02-24

## 2023-03-08 ENCOUNTER — TELEPHONE (OUTPATIENT)
Dept: INTERNAL MEDICINE CLINIC | Age: 88
End: 2023-03-08

## 2023-03-08 DIAGNOSIS — E55.9 VITAMIN D DEFICIENCY: ICD-10-CM

## 2023-03-08 DIAGNOSIS — F02.80 LATE ONSET ALZHEIMER'S DISEASE WITHOUT BEHAVIORAL DISTURBANCE (HCC): ICD-10-CM

## 2023-03-08 DIAGNOSIS — E78.2 MIXED HYPERLIPIDEMIA: ICD-10-CM

## 2023-03-08 DIAGNOSIS — G30.1 LATE ONSET ALZHEIMER'S DISEASE WITHOUT BEHAVIORAL DISTURBANCE (HCC): ICD-10-CM

## 2023-03-08 DIAGNOSIS — I10 PRIMARY HYPERTENSION: ICD-10-CM

## 2023-03-08 RX ORDER — ESCITALOPRAM OXALATE 10 MG/1
TABLET ORAL
Qty: 10 TABLET | Refills: 0 | OUTPATIENT
Start: 2023-03-08

## 2023-03-08 RX ORDER — ROSUVASTATIN CALCIUM 40 MG/1
TABLET, COATED ORAL
Qty: 30 TABLET | Refills: 0 | OUTPATIENT
Start: 2023-03-08

## 2023-03-08 RX ORDER — DONEPEZIL HYDROCHLORIDE 10 MG/1
TABLET, FILM COATED ORAL
Qty: 30 TABLET | Refills: 0 | OUTPATIENT
Start: 2023-03-08

## 2023-03-08 RX ORDER — AMLODIPINE BESYLATE 5 MG/1
5 TABLET ORAL DAILY
Qty: 90 TABLET | Refills: 0 | OUTPATIENT
Start: 2023-03-08

## 2023-03-08 RX ORDER — MULTIVIT-MIN/IRON/FOLIC ACID/K 18-600-40
1 CAPSULE ORAL DAILY
Qty: 90 CAPSULE | Refills: 0 | OUTPATIENT
Start: 2023-03-08

## 2023-03-08 RX ORDER — MEMANTINE HYDROCHLORIDE 7 MG/1
CAPSULE, EXTENDED RELEASE ORAL
Qty: 14 CAPSULE | Refills: 0 | OUTPATIENT
Start: 2023-03-08

## 2023-03-08 RX ORDER — TELMISARTAN 40 MG/1
40 TABLET ORAL DAILY
Qty: 90 TABLET | Refills: 0 | OUTPATIENT
Start: 2023-03-08

## 2023-03-08 NOTE — TELEPHONE ENCOUNTER
Patient needs refills on   Escitalopram 10 mg  Memantine ER 7 mg  Donepezil 10 mg  Rosuvastatin 40 mg  Amlodipine 5 mg  Telmisartan 40 mg  Vitamin D  Potassium chloride 20 mg  Please advise

## 2023-03-08 NOTE — TELEPHONE ENCOUNTER
Medication:   Requested Prescriptions     Pending Prescriptions Disp Refills    escitalopram (LEXAPRO) 10 MG tablet 10 tablet 0     Sig: TAKE ONE TABLET BY MOUTH DAILY    memantine ER (NAMENDA XR) 7 MG CP24 extended release capsule 14 capsule 0     Sig: TAKE ONE CAPSULE BY MOUTH DAILY    donepezil (ARICEPT) 10 MG tablet 30 tablet 0     Sig: TAKE ONE TABLET BY MOUTH ONCE NIGHTLY    rosuvastatin (CRESTOR) 40 MG tablet 30 tablet 0     Sig: TAKE ONE TABLET BY MOUTH ONCE NIGHTLY    amLODIPine (NORVASC) 5 MG tablet 90 tablet 0     Sig: Take 1 tablet by mouth daily    telmisartan (MICARDIS) 40 MG tablet 90 tablet 0     Sig: Take 1 tablet by mouth daily    Cholecalciferol (VITAMIN D) 50 MCG (2000 UT) CAPS capsule 90 capsule 0     Sig: Take 1 capsule by mouth daily        Last Filled:  8/5/22    Patient Phone Number: [phone] (home) [phone] (work)    Last appt: 8/5/2022   Next appt: Visit date not found    Last OARRS: No flowsheet data found.

## 2023-03-10 DIAGNOSIS — E78.2 MIXED HYPERLIPIDEMIA: ICD-10-CM

## 2023-03-10 DIAGNOSIS — R63.4 WEIGHT LOSS: ICD-10-CM

## 2023-03-10 NOTE — TELEPHONE ENCOUNTER
Medication:   Requested Prescriptions     Pending Prescriptions Disp Refills    rosuvastatin (CRESTOR) 40 MG tablet [Pharmacy Med Name: ROSUVASTATIN CALCIUM 40 MG TAB] 30 tablet 0     Sig: TAKE ONE TABLET BY MOUTH ONCE NIGHTLY    Multiple Vitamins-Minerals (MULTIVITAMIN-MINERALS) TABS tablet [Pharmacy Med Name: Wilmer Hawk 90 tablet      Sig: TAKE ONE TABLET BY MOUTH DAILY     Last Filled:  01/12/2023    Last appt: 8/5/2022   Next appt: 3/13/2023

## 2023-03-13 ENCOUNTER — OFFICE VISIT (OUTPATIENT)
Dept: INTERNAL MEDICINE CLINIC | Age: 88
End: 2023-03-13
Payer: MEDICARE

## 2023-03-13 VITALS
WEIGHT: 118 LBS | TEMPERATURE: 98.5 F | HEART RATE: 62 BPM | DIASTOLIC BLOOD PRESSURE: 80 MMHG | OXYGEN SATURATION: 98 % | SYSTOLIC BLOOD PRESSURE: 138 MMHG | BODY MASS INDEX: 19.05 KG/M2

## 2023-03-13 DIAGNOSIS — F33.1 MODERATE EPISODE OF RECURRENT MAJOR DEPRESSIVE DISORDER (HCC): ICD-10-CM

## 2023-03-13 DIAGNOSIS — E78.2 MIXED HYPERLIPIDEMIA: ICD-10-CM

## 2023-03-13 DIAGNOSIS — D69.6 THROMBOCYTOPENIA (HCC): ICD-10-CM

## 2023-03-13 DIAGNOSIS — F02.80 LATE ONSET ALZHEIMER'S DISEASE WITHOUT BEHAVIORAL DISTURBANCE (HCC): ICD-10-CM

## 2023-03-13 DIAGNOSIS — E87.6 HYPOKALEMIA: ICD-10-CM

## 2023-03-13 DIAGNOSIS — E55.9 VITAMIN D DEFICIENCY: ICD-10-CM

## 2023-03-13 DIAGNOSIS — G30.1 LATE ONSET ALZHEIMER'S DISEASE WITHOUT BEHAVIORAL DISTURBANCE (HCC): ICD-10-CM

## 2023-03-13 DIAGNOSIS — I10 PRIMARY HYPERTENSION: Primary | ICD-10-CM

## 2023-03-13 PROCEDURE — 99214 OFFICE O/P EST MOD 30 MIN: CPT | Performed by: INTERNAL MEDICINE

## 2023-03-13 PROCEDURE — 1123F ACP DISCUSS/DSCN MKR DOCD: CPT | Performed by: INTERNAL MEDICINE

## 2023-03-13 PROCEDURE — 1090F PRES/ABSN URINE INCON ASSESS: CPT | Performed by: INTERNAL MEDICINE

## 2023-03-13 PROCEDURE — G8427 DOCREV CUR MEDS BY ELIG CLIN: HCPCS | Performed by: INTERNAL MEDICINE

## 2023-03-13 PROCEDURE — G8484 FLU IMMUNIZE NO ADMIN: HCPCS | Performed by: INTERNAL MEDICINE

## 2023-03-13 PROCEDURE — 1036F TOBACCO NON-USER: CPT | Performed by: INTERNAL MEDICINE

## 2023-03-13 PROCEDURE — G8420 CALC BMI NORM PARAMETERS: HCPCS | Performed by: INTERNAL MEDICINE

## 2023-03-13 RX ORDER — DONEPEZIL HYDROCHLORIDE 10 MG/1
10 TABLET, FILM COATED ORAL NIGHTLY
Qty: 90 TABLET | Refills: 0 | Status: SHIPPED | OUTPATIENT
Start: 2023-03-13

## 2023-03-13 RX ORDER — ROSUVASTATIN CALCIUM 40 MG/1
TABLET, COATED ORAL
Qty: 90 TABLET | Refills: 0 | Status: SHIPPED | OUTPATIENT
Start: 2023-03-13

## 2023-03-13 RX ORDER — MULTIVIT-MIN/IRON/FOLIC ACID/K 18-600-40
1 CAPSULE ORAL DAILY
Qty: 90 CAPSULE | Refills: 0 | Status: SHIPPED | OUTPATIENT
Start: 2023-03-13

## 2023-03-13 RX ORDER — ESCITALOPRAM OXALATE 10 MG/1
TABLET ORAL
Qty: 90 TABLET | Refills: 0 | Status: SHIPPED | OUTPATIENT
Start: 2023-03-13

## 2023-03-13 RX ORDER — TELMISARTAN 40 MG/1
40 TABLET ORAL DAILY
Qty: 90 TABLET | Refills: 0 | Status: SHIPPED | OUTPATIENT
Start: 2023-03-13

## 2023-03-13 RX ORDER — AMLODIPINE BESYLATE 5 MG/1
5 TABLET ORAL DAILY
Qty: 90 TABLET | Refills: 0 | Status: SHIPPED | OUTPATIENT
Start: 2023-03-13

## 2023-03-13 RX ORDER — MEMANTINE HYDROCHLORIDE 14 MG/1
14 CAPSULE, EXTENDED RELEASE ORAL DAILY
Qty: 90 CAPSULE | Refills: 0 | Status: SHIPPED | OUTPATIENT
Start: 2023-03-13

## 2023-03-13 ASSESSMENT — PATIENT HEALTH QUESTIONNAIRE - PHQ9
5. POOR APPETITE OR OVEREATING: 0
3. TROUBLE FALLING OR STAYING ASLEEP: 0
SUM OF ALL RESPONSES TO PHQ QUESTIONS 1-9: 0
SUM OF ALL RESPONSES TO PHQ QUESTIONS 1-9: 0
2. FEELING DOWN, DEPRESSED OR HOPELESS: 0
6. FEELING BAD ABOUT YOURSELF - OR THAT YOU ARE A FAILURE OR HAVE LET YOURSELF OR YOUR FAMILY DOWN: 0
1. LITTLE INTEREST OR PLEASURE IN DOING THINGS: 0
SUM OF ALL RESPONSES TO PHQ QUESTIONS 1-9: 0
SUM OF ALL RESPONSES TO PHQ QUESTIONS 1-9: 0
8. MOVING OR SPEAKING SO SLOWLY THAT OTHER PEOPLE COULD HAVE NOTICED. OR THE OPPOSITE, BEING SO FIGETY OR RESTLESS THAT YOU HAVE BEEN MOVING AROUND A LOT MORE THAN USUAL: 0
4. FEELING TIRED OR HAVING LITTLE ENERGY: 0
SUM OF ALL RESPONSES TO PHQ9 QUESTIONS 1 & 2: 0
7. TROUBLE CONCENTRATING ON THINGS, SUCH AS READING THE NEWSPAPER OR WATCHING TELEVISION: 0
9. THOUGHTS THAT YOU WOULD BE BETTER OFF DEAD, OR OF HURTING YOURSELF: 0

## 2023-03-13 NOTE — PATIENT INSTRUCTIONS
TAKE MED AS ADVISED    DIET/ EXERCISE. FOLLOW UP WITHIN 3 MONTHS / AS NEEDED    FOLLOW UP FOR FASTING 5 [facility] Locations - No appointment necessary. @ indicates the location is open Saturdays in addition to Monday through Friday. Call your preferred location for test preparation, business hours and other information you need. [employer] accepts [employer]. [facility]     @ [address] [facility] [address] [facility]. [place], [address]    Ph: [address]   650 [facility], [address]    Ph: [phone]   @ [address]    [facility]    Ph: [address]    Ph: [phone]  @ [address]   Ph: [phone]  @ [address] [place] [address]. [facility] [address]    Ph: [address] [place] [address] [facility] 19   Ph: [phone]    [place]    @ [facility]. [place], [place] [address]    Ph: [phone]  [address]   Ph: [address], [address]    [facility]: [address] [facility] Jori Peres    Ph: [phone]   [address] [facility]. Jori Alejo   Ph: [address] [facility] Med.  [address] [place], [place] [address]    Ph: [phone]

## 2023-03-13 NOTE — PROGRESS NOTES
SUBJECTIVE:  Bonny Byers is a 81 y.o. female [address] [place]   Chief Complaint   Patient presents with    Medication Refill    Hypertension      PT HERE FOR EVAL  PT HERE WITH SON    HTN -  ? MED COMPLIANCE. BP NOTED. ? DIET / EXERCISE COMPLIANCE. NO HEADACHE , OCC DIZZINESS. HLP - ? MED COMPLIANCE. + EXERCISE / DIET  COMPLIANCE . NO MUSCLE CRAMPS / NO MUSCLE ACHES. PREVIOUS  - LAB D/W PT  DEMENTIA - ? MED COMPLIANCE. MEMORY UNCHANGED PER SON   DEPRESSION - TAKING MED. NO INSOMNIA . DENIES SUICIDAL / NO HOMICIDAL THOUGHTS / IDEATION  THROMBOCYTOPENIA - CHRONIC. DENIES BRUISING. VIT D DEF -  ? MED COMPLIANCE. PREVIOUS LABS D/W PT  HYPOK+ - TAKING MED. DENIES MUSCLE CRAMPS. IMPROVED - LABS D/W PT / SON      DENIES CP, No SOB, No PALPITATIONS, No COUGH, NO F/C  No ABD PAIN, NO NAUSEA, NO VOMITING, No DIARRHEA, No CONSTIPATION, No MELENA, No HEMATOCHEZIA  No DYSURIA, No URI. FREQ, No URGENCY, No HEMATURIA    PMH: REVIEWED AND UPDATED TODAY    PSH: REVIEWED AND UPDATED TODAY    SOCIAL HX: REVIEWED AND UPDATED TODAY    FAMILY HX: REVIEWED AND UPDATED TODAY    ALLERGY:  Nabumetone    MEDS: REVIEWED  Prior to Visit Medications    Medication Sig Taking? Authorizing Provider   escitalopram (LEXAPRO) 10 MG tablet TAKE ONE TABLET BY MOUTH DAILY Yes Teresa Hoffmann MD   memantine ER (NAMENDA XR) 7 MG CP24 extended release capsule TAKE ONE CAPSULE BY MOUTH DAILY Yes Teresa Hoffmann MD   donepezil (ARICEPT) 10 MG tablet TAKE ONE TABLET BY MOUTH ONCE NIGHTLY Yes Teresa Hoffmann MD   rosuvastatin (CRESTOR) 40 MG tablet TAKE ONE TABLET BY MOUTH ONCE NIGHTLY Yes Teresa Hoffmann MD   amLODIPine (NORVASC) 5 MG tablet Take 1 tablet by mouth daily Yes Teresa Hoffmann MD   telmisartan (MICARDIS) 40 MG tablet Take 1 tablet by mouth daily Yes Teresa Hoffmann MD   Cholecalciferol (VITAMIN D) 50 MCG (2000 UT) CAPS capsule Take 1 capsule by mouth in the morning.  Yes Teresa Hoffmann MD   potassium chloride (KLOR-CON M) 20 MEQ extended release tablet Take 1 tablet by mouth in the morning. Yes Alfredo Claire MD   Multiple Vitamins-Minerals (THERAPEUTIC MULTIVITAMIN-MINERALS) tablet Take 1 tablet by mouth daily Yes Alfredo Claire MD   dextran 70-hypromellose (TEARS PURE) 0.1-0.3 % SOLN opthalmic solution Place 1 drop into both eyes as needed (dry eyes) Yes Historical Provider, MD   aspirin 81 MG tablet Take 81 mg by mouth daily Yes Historical Provider, MD   Nutritional Supplements (ENSURE PLUS HN) LIQD Take 1 Can by mouth 3 times daily  Alfredo Claire MD       ROS: COMPREHENSIVE ROS AS IN HX, REST -VE  History obtained from child, chart review, and the patient       OBJECTIVE:   NURSING NOTE AND VITALS REVIEWED  /78   Pulse 62   Temp 98.5 °F (36.9 °C)   Wt 118 lb (53.5 kg)   SpO2 98%   BMI 19.05 kg/m²     NO ACUTE DISTRESS    REPEAT BP: 138/80 (LT), NO ORTHOSTASIS     Body mass index is 19.05 kg/m². HEENT: NO PALLOR, ANICTERIC, PERRLA, EOMI, NO CONJUNCTIVAL ERYTHEMA,                 NO SINUS TENDERNESS  NECK:  SUPPLE, TRACHEA MIDLINE, NT, NO JVD, NO CB, NO LA, NO TM, NO STIFFNESS  CHEST: RESPY EFFORT NL, GOOD AE, NO W/R/C  HEART: S1S2+ REG, NO M/G/R  ABD: SOFT, NT, NO HSM, BS+  EXT: NO EDEMA, NT, PULSES +. TWAN'S -VE  NEURO: ALERT AND ORIENTED X 2 - CONFUSED WITH DATE, NO MENINGEAL SIGNS, NO TREMORS, AMBULATING WITH A SLIGHT LIMP OTHERWISE, NO NEW FOCAL DEFICITS  PSYCH: FAIRLY GOOD AFFECT. RECALL 1/3  BACK: NT, NO ROM, NO CVA TENDERNESS  SKIN: NO BRUISING NOTED      PREVIOUS LABS REVIEWED AND D/W PT    ASSESSMENT / PLAN:     Diagnosis Orders   1. Primary hypertension  COUNSELLED. BP NOT AT GOAL. DEFERRED MED CHANGE. ADVISED MED COMPLIANCE  ADVISED LOW NA+ / DASH DIET/ EXERCISE. MONITOR. GOAL </= 130/80  F/U LABS  MAKE CHANGES AS NEEDED. 2. Mixed hyperlipidemia  COUNSELLED. ADVISED LOW FAT / CHOL DIET/ EXERCISE.  MONITOR ON CRESTOR. F/U LABS. GOALS D/W PT.  MAKE CHANGES AS NEEDED.        3. Late onset Alzheimer's disease without behavioral disturbance ([facility].)  COUNSELLED. INCREASE NAMENDA XR TO 14 MG DAILY. CONTINUE ARICEPT  D/W PT AND SON - ADVISED ON SUPERVISED CARE. ADVISED ENCOURAGE INTERACTIVE ACTIVITY  F/U LABS  MAKE CHANGES AS NEEDED. 4. Moderate episode of recurrent major depressive disorder ([facility].)  COUNSELLED. ADVISED ON MED COMPLIANCE - LEXAPRO 10 MG REFILLED  PT COUNSELLED  ON RELAXATION TECHNIQUES. ADDRESSED STRESS MGT. MONITOR  PT NOT SUICIDAL/ NOT HOMICIDAL  MAKE CHANGES AS NEEDED. 5. Thrombocytopenia ([facility].)  COUNSELLED. ASYMPTOMATIC. MONITOR LAB TO REEVAL. MAKE CHANGES AS NEEDED. 6. Vitamin D deficiency  COUNSELLED. MONITOR ON VIT D SUPPLEMENT. MAKE CHANGES AS NEEDED. 7. Hypokalemia  COUNSELLED. COMPLETED MED.  MONITOR LABS AND MAKE CHANGES AS NEEDED                       MEDICATION SIDE EFFECTS D/W PATIENT    RETURN TO CLINIC WITHIN 3 MONTHS / PRN    FOLLOW UP FOR FASTING LABS

## 2023-03-15 RX ORDER — ASPIRIN 81 MG
TABLET, DELAYED RELEASE (ENTERIC COATED) ORAL
Qty: 90 TABLET | Refills: 1 | Status: SHIPPED | OUTPATIENT
Start: 2023-03-15

## 2023-03-15 RX ORDER — ROSUVASTATIN CALCIUM 40 MG/1
TABLET, COATED ORAL
Qty: 30 TABLET | Refills: 0 | OUTPATIENT
Start: 2023-03-15

## 2023-03-16 DIAGNOSIS — G30.1 LATE ONSET ALZHEIMER'S DISEASE WITHOUT BEHAVIORAL DISTURBANCE (HCC): ICD-10-CM

## 2023-03-16 DIAGNOSIS — F02.80 LATE ONSET ALZHEIMER'S DISEASE WITHOUT BEHAVIORAL DISTURBANCE (HCC): ICD-10-CM

## 2023-03-16 DIAGNOSIS — E87.6 HYPOKALEMIA: ICD-10-CM

## 2023-03-16 DIAGNOSIS — E55.9 VITAMIN D DEFICIENCY: ICD-10-CM

## 2023-03-16 DIAGNOSIS — I10 PRIMARY HYPERTENSION: ICD-10-CM

## 2023-03-16 DIAGNOSIS — D69.6 THROMBOCYTOPENIA (HCC): ICD-10-CM

## 2023-03-16 DIAGNOSIS — E78.2 MIXED HYPERLIPIDEMIA: ICD-10-CM

## 2023-03-16 LAB
ALBUMIN SERPL-MCNC: 4 G/DL (ref 3.4–5)
ALBUMIN/GLOB SERPL: 1.7 {RATIO} (ref 1.1–2.2)
ALP SERPL-CCNC: 66 U/L (ref 40–129)
ALT SERPL-CCNC: 14 U/L (ref 10–40)
ANION GAP SERPL CALCULATED.3IONS-SCNC: 9 MMOL/L (ref 3–16)
AST SERPL-CCNC: 21 U/L (ref 15–37)
BACTERIA URNS QL MICRO: NORMAL /HPF
BILIRUB SERPL-MCNC: 1.6 MG/DL (ref 0–1)
BILIRUB UR QL STRIP.AUTO: NEGATIVE
BUN SERPL-MCNC: 10 MG/DL (ref 7–20)
CALCIUM SERPL-MCNC: 9.3 MG/DL (ref 8.3–10.6)
CHLORIDE SERPL-SCNC: 106 MMOL/L (ref 99–110)
CHOLEST SERPL-MCNC: 158 MG/DL (ref 0–199)
CLARITY UR: CLEAR
CO2 SERPL-SCNC: 28 MMOL/L (ref 21–32)
COLOR UR: YELLOW
CREAT SERPL-MCNC: 0.9 MG/DL (ref 0.6–1.2)
EPI CELLS #/AREA URNS AUTO: 4 /HPF (ref 0–5)
GFR SERPLBLD CREATININE-BSD FMLA CKD-EPI: >60 ML/MIN/{1.73_M2}
GLUCOSE SERPL-MCNC: 85 MG/DL (ref 70–99)
GLUCOSE UR STRIP.AUTO-MCNC: NEGATIVE MG/DL
HDLC SERPL-MCNC: 65 MG/DL (ref 40–60)
HGB UR QL STRIP.AUTO: NEGATIVE
HYALINE CASTS #/AREA URNS AUTO: 0 /LPF (ref 0–8)
KETONES UR STRIP.AUTO-MCNC: NEGATIVE MG/DL
LDLC SERPL CALC-MCNC: 81 MG/DL
LEUKOCYTE ESTERASE UR QL STRIP.AUTO: ABNORMAL
NITRITE UR QL STRIP.AUTO: NEGATIVE
PH UR STRIP.AUTO: 6 [PH] (ref 5–8)
POTASSIUM SERPL-SCNC: 4.1 MMOL/L (ref 3.5–5.1)
PROT SERPL-MCNC: 6.4 G/DL (ref 6.4–8.2)
PROT UR STRIP.AUTO-MCNC: NEGATIVE MG/DL
RBC CLUMPS #/AREA URNS AUTO: 1 /HPF (ref 0–4)
SODIUM SERPL-SCNC: 143 MMOL/L (ref 136–145)
SP GR UR STRIP.AUTO: 1.01 (ref 1–1.03)
TRIGL SERPL-MCNC: 59 MG/DL (ref 0–150)
UA COMPLETE W REFLEX CULTURE PNL UR: ABNORMAL
UA DIPSTICK W REFLEX MICRO PNL UR: YES
URN SPEC COLLECT METH UR: ABNORMAL
UROBILINOGEN UR STRIP-ACNC: 0.2 E.U./DL
VLDLC SERPL CALC-MCNC: 12 MG/DL
WBC #/AREA URNS AUTO: 3 /HPF (ref 0–5)

## 2023-03-17 LAB
25(OH)D3 SERPL-MCNC: 40.4 NG/ML
BASOPHILS # BLD: 0 K/UL (ref 0–0.2)
BASOPHILS NFR BLD: 0.4 %
CREAT UR-MCNC: 102.2 MG/DL (ref 28–259)
DEPRECATED RDW RBC AUTO: 15.7 % (ref 12.4–15.4)
EOSINOPHIL # BLD: 0.1 K/UL (ref 0–0.6)
EOSINOPHIL NFR BLD: 1.3 %
FOLATE SERPL-MCNC: 19.95 NG/ML (ref 4.78–24.2)
HCT VFR BLD AUTO: 33.6 % (ref 36–48)
HGB BLD-MCNC: 10.9 G/DL (ref 12–16)
LYMPHOCYTES # BLD: 1.2 K/UL (ref 1–5.1)
LYMPHOCYTES NFR BLD: 23.6 %
MCH RBC QN AUTO: 26.3 PG (ref 26–34)
MCHC RBC AUTO-ENTMCNC: 32.2 G/DL (ref 31–36)
MCV RBC AUTO: 81.6 FL (ref 80–100)
MICROALBUMIN UR DL<=1MG/L-MCNC: <1.2 MG/DL
MICROALBUMIN/CREAT UR: NORMAL MG/G (ref 0–30)
MONOCYTES # BLD: 0.4 K/UL (ref 0–1.3)
MONOCYTES NFR BLD: 8.5 %
NEUTROPHILS # BLD: 3.2 K/UL (ref 1.7–7.7)
NEUTROPHILS NFR BLD: 66.2 %
PLATELET # BLD AUTO: 114 K/UL (ref 135–450)
PLATELET BLD QL SMEAR: ABNORMAL
PMV BLD AUTO: 11.2 FL (ref 5–10.5)
RBC # BLD AUTO: 4.12 M/UL (ref 4–5.2)
SLIDE REVIEW: ABNORMAL
TSH SERPL DL<=0.005 MIU/L-ACNC: 1.27 UIU/ML (ref 0.27–4.2)
VIT B12 SERPL-MCNC: 754 PG/ML (ref 211–911)
WBC # BLD AUTO: 4.9 K/UL (ref 4–11)

## 2023-03-30 DIAGNOSIS — I10 PRIMARY HYPERTENSION: ICD-10-CM

## 2023-03-30 RX ORDER — AMLODIPINE BESYLATE 5 MG/1
TABLET ORAL
Qty: 90 TABLET | Refills: 0 | OUTPATIENT
Start: 2023-03-30

## 2023-05-21 DIAGNOSIS — G30.1 LATE ONSET ALZHEIMER'S DISEASE WITHOUT BEHAVIORAL DISTURBANCE (HCC): ICD-10-CM

## 2023-05-21 DIAGNOSIS — F02.80 LATE ONSET ALZHEIMER'S DISEASE WITHOUT BEHAVIORAL DISTURBANCE (HCC): ICD-10-CM

## 2023-05-22 RX ORDER — DONEPEZIL HYDROCHLORIDE 10 MG/1
TABLET, FILM COATED ORAL
Qty: 90 TABLET | Refills: 0 | Status: SHIPPED | OUTPATIENT
Start: 2023-05-22 | End: 2023-06-13 | Stop reason: SDUPTHER

## 2023-06-11 DIAGNOSIS — F33.1 MODERATE EPISODE OF RECURRENT MAJOR DEPRESSIVE DISORDER (HCC): ICD-10-CM

## 2023-06-11 DIAGNOSIS — I10 PRIMARY HYPERTENSION: ICD-10-CM

## 2023-06-11 DIAGNOSIS — E78.2 MIXED HYPERLIPIDEMIA: ICD-10-CM

## 2023-06-13 ENCOUNTER — OFFICE VISIT (OUTPATIENT)
Dept: INTERNAL MEDICINE CLINIC | Age: 88
End: 2023-06-13
Payer: MEDICARE

## 2023-06-13 VITALS
BODY MASS INDEX: 18.4 KG/M2 | WEIGHT: 114 LBS | SYSTOLIC BLOOD PRESSURE: 128 MMHG | HEART RATE: 66 BPM | DIASTOLIC BLOOD PRESSURE: 80 MMHG | OXYGEN SATURATION: 96 %

## 2023-06-13 DIAGNOSIS — F02.80 LATE ONSET ALZHEIMER'S DISEASE WITHOUT BEHAVIORAL DISTURBANCE (HCC): Primary | ICD-10-CM

## 2023-06-13 DIAGNOSIS — I71.40 ABDOMINAL AORTIC ANEURYSM (AAA) WITHOUT RUPTURE, UNSPECIFIED PART (HCC): ICD-10-CM

## 2023-06-13 DIAGNOSIS — F33.1 MODERATE EPISODE OF RECURRENT MAJOR DEPRESSIVE DISORDER (HCC): ICD-10-CM

## 2023-06-13 DIAGNOSIS — I10 PRIMARY HYPERTENSION: ICD-10-CM

## 2023-06-13 DIAGNOSIS — G30.1 LATE ONSET ALZHEIMER'S DISEASE WITHOUT BEHAVIORAL DISTURBANCE (HCC): Primary | ICD-10-CM

## 2023-06-13 DIAGNOSIS — E78.2 MIXED HYPERLIPIDEMIA: ICD-10-CM

## 2023-06-13 DIAGNOSIS — D64.89 ANEMIA DUE TO OTHER CAUSE, NOT CLASSIFIED: ICD-10-CM

## 2023-06-13 DIAGNOSIS — E55.9 VITAMIN D DEFICIENCY: ICD-10-CM

## 2023-06-13 DIAGNOSIS — D69.6 THROMBOCYTOPENIA (HCC): ICD-10-CM

## 2023-06-13 PROCEDURE — G8419 CALC BMI OUT NRM PARAM NOF/U: HCPCS | Performed by: INTERNAL MEDICINE

## 2023-06-13 PROCEDURE — 1036F TOBACCO NON-USER: CPT | Performed by: INTERNAL MEDICINE

## 2023-06-13 PROCEDURE — 1123F ACP DISCUSS/DSCN MKR DOCD: CPT | Performed by: INTERNAL MEDICINE

## 2023-06-13 PROCEDURE — 1090F PRES/ABSN URINE INCON ASSESS: CPT | Performed by: INTERNAL MEDICINE

## 2023-06-13 PROCEDURE — 99214 OFFICE O/P EST MOD 30 MIN: CPT | Performed by: INTERNAL MEDICINE

## 2023-06-13 PROCEDURE — G8427 DOCREV CUR MEDS BY ELIG CLIN: HCPCS | Performed by: INTERNAL MEDICINE

## 2023-06-13 RX ORDER — TELMISARTAN 40 MG/1
40 TABLET ORAL DAILY
Qty: 90 TABLET | Refills: 0 | Status: SHIPPED | OUTPATIENT
Start: 2023-06-13

## 2023-06-13 RX ORDER — ROSUVASTATIN CALCIUM 40 MG/1
TABLET, COATED ORAL
Qty: 90 TABLET | Refills: 0 | Status: SHIPPED | OUTPATIENT
Start: 2023-06-13

## 2023-06-13 RX ORDER — ESCITALOPRAM OXALATE 10 MG/1
TABLET ORAL
Qty: 90 TABLET | Refills: 0 | Status: SHIPPED | OUTPATIENT
Start: 2023-06-13

## 2023-06-13 RX ORDER — AMLODIPINE BESYLATE 5 MG/1
5 TABLET ORAL DAILY
Qty: 90 TABLET | Refills: 0 | Status: SHIPPED | OUTPATIENT
Start: 2023-06-13

## 2023-06-13 RX ORDER — MULTIVIT-MIN/IRON/FOLIC ACID/K 18-600-40
1 CAPSULE ORAL DAILY
Qty: 90 CAPSULE | Refills: 0 | Status: SHIPPED | OUTPATIENT
Start: 2023-06-13

## 2023-06-13 RX ORDER — MEMANTINE HYDROCHLORIDE 14 MG/1
14 CAPSULE, EXTENDED RELEASE ORAL DAILY
Qty: 90 CAPSULE | Refills: 0 | Status: SHIPPED | OUTPATIENT
Start: 2023-06-13

## 2023-06-13 RX ORDER — DONEPEZIL HYDROCHLORIDE 10 MG/1
10 TABLET, FILM COATED ORAL NIGHTLY
Qty: 90 TABLET | Refills: 0 | Status: SHIPPED | OUTPATIENT
Start: 2023-06-13

## 2023-06-13 SDOH — ECONOMIC STABILITY: HOUSING INSECURITY
IN THE LAST 12 MONTHS, WAS THERE A TIME WHEN YOU DID NOT HAVE A STEADY PLACE TO SLEEP OR SLEPT IN A SHELTER (INCLUDING NOW)?: NO

## 2023-06-13 SDOH — ECONOMIC STABILITY: INCOME INSECURITY: HOW HARD IS IT FOR YOU TO PAY FOR THE VERY BASICS LIKE FOOD, HOUSING, MEDICAL CARE, AND HEATING?: NOT HARD AT ALL

## 2023-06-13 SDOH — ECONOMIC STABILITY: FOOD INSECURITY: WITHIN THE PAST 12 MONTHS, THE FOOD YOU BOUGHT JUST DIDN'T LAST AND YOU DIDN'T HAVE MONEY TO GET MORE.: NEVER TRUE

## 2023-06-13 SDOH — ECONOMIC STABILITY: FOOD INSECURITY: WITHIN THE PAST 12 MONTHS, YOU WORRIED THAT YOUR FOOD WOULD RUN OUT BEFORE YOU GOT MONEY TO BUY MORE.: NEVER TRUE

## 2023-06-14 RX ORDER — ROSUVASTATIN CALCIUM 40 MG/1
TABLET, COATED ORAL
Qty: 90 TABLET | Refills: 0 | OUTPATIENT
Start: 2023-06-14

## 2023-06-14 RX ORDER — ESCITALOPRAM OXALATE 10 MG/1
TABLET ORAL
Qty: 90 TABLET | Refills: 0 | OUTPATIENT
Start: 2023-06-14

## 2023-06-14 RX ORDER — AMLODIPINE BESYLATE 5 MG/1
TABLET ORAL
Qty: 90 TABLET | Refills: 0 | OUTPATIENT
Start: 2023-06-14

## 2023-07-07 DIAGNOSIS — F33.1 MODERATE EPISODE OF RECURRENT MAJOR DEPRESSIVE DISORDER (HCC): ICD-10-CM

## 2023-07-07 RX ORDER — ESCITALOPRAM OXALATE 10 MG/1
TABLET ORAL
Qty: 90 TABLET | Refills: 0 | Status: SHIPPED | OUTPATIENT
Start: 2023-07-07

## 2023-09-08 DIAGNOSIS — R63.4 WEIGHT LOSS: ICD-10-CM

## 2023-09-11 RX ORDER — ASPIRIN 81 MG
TABLET, DELAYED RELEASE (ENTERIC COATED) ORAL
Qty: 90 TABLET | Refills: 1 | Status: SHIPPED | OUTPATIENT
Start: 2023-09-11

## 2023-09-14 ENCOUNTER — OFFICE VISIT (OUTPATIENT)
Dept: INTERNAL MEDICINE CLINIC | Age: 88
End: 2023-09-14

## 2023-09-14 VITALS
OXYGEN SATURATION: 98 % | WEIGHT: 112 LBS | DIASTOLIC BLOOD PRESSURE: 80 MMHG | BODY MASS INDEX: 18.08 KG/M2 | SYSTOLIC BLOOD PRESSURE: 126 MMHG | HEART RATE: 66 BPM

## 2023-09-14 DIAGNOSIS — D69.6 THROMBOCYTOPENIA (HCC): ICD-10-CM

## 2023-09-14 DIAGNOSIS — E78.2 MIXED HYPERLIPIDEMIA: ICD-10-CM

## 2023-09-14 DIAGNOSIS — F33.1 MODERATE EPISODE OF RECURRENT MAJOR DEPRESSIVE DISORDER (HCC): ICD-10-CM

## 2023-09-14 DIAGNOSIS — N39.0 ACUTE UTI: ICD-10-CM

## 2023-09-14 DIAGNOSIS — Z00.00 MEDICARE ANNUAL WELLNESS VISIT, SUBSEQUENT: Primary | ICD-10-CM

## 2023-09-14 DIAGNOSIS — G30.1 LATE ONSET ALZHEIMER'S DISEASE WITHOUT BEHAVIORAL DISTURBANCE (HCC): ICD-10-CM

## 2023-09-14 DIAGNOSIS — E55.9 VITAMIN D DEFICIENCY: ICD-10-CM

## 2023-09-14 DIAGNOSIS — F02.80 LATE ONSET ALZHEIMER'S DISEASE WITHOUT BEHAVIORAL DISTURBANCE (HCC): ICD-10-CM

## 2023-09-14 DIAGNOSIS — I10 PRIMARY HYPERTENSION: ICD-10-CM

## 2023-09-14 LAB
BILIRUBIN, POC: NEGATIVE
BLOOD URINE, POC: NEGATIVE
CLARITY, POC: CLEAR
COLOR, POC: YELLOW
GLUCOSE URINE, POC: NEGATIVE
KETONES, POC: NEGATIVE
LEUKOCYTE EST, POC: NORMAL
NITRITE, POC: NEGATIVE
PH, POC: 7
PROTEIN, POC: NEGATIVE
SPECIFIC GRAVITY, POC: 1.02
UROBILINOGEN, POC: NORMAL

## 2023-09-14 RX ORDER — AMLODIPINE BESYLATE 5 MG/1
5 TABLET ORAL DAILY
Qty: 90 TABLET | Refills: 0 | Status: SHIPPED | OUTPATIENT
Start: 2023-09-14

## 2023-09-14 RX ORDER — ESCITALOPRAM OXALATE 20 MG/1
20 TABLET ORAL DAILY
Qty: 90 TABLET | Refills: 0 | Status: SHIPPED | OUTPATIENT
Start: 2023-09-14

## 2023-09-14 RX ORDER — DONEPEZIL HYDROCHLORIDE 10 MG/1
10 TABLET, FILM COATED ORAL NIGHTLY
Qty: 90 TABLET | Refills: 0 | Status: SHIPPED | OUTPATIENT
Start: 2023-09-14

## 2023-09-14 RX ORDER — ROSUVASTATIN CALCIUM 40 MG/1
TABLET, COATED ORAL
Qty: 90 TABLET | Refills: 0 | Status: SHIPPED | OUTPATIENT
Start: 2023-09-14

## 2023-09-14 RX ORDER — MEMANTINE HYDROCHLORIDE 21 MG/1
21 CAPSULE, EXTENDED RELEASE ORAL DAILY
Qty: 90 CAPSULE | Refills: 0 | Status: SHIPPED | OUTPATIENT
Start: 2023-09-14

## 2023-09-14 RX ORDER — SULFAMETHOXAZOLE AND TRIMETHOPRIM 800; 160 MG/1; MG/1
1 TABLET ORAL 2 TIMES DAILY
Qty: 6 TABLET | Refills: 0 | Status: SHIPPED | OUTPATIENT
Start: 2023-09-14 | End: 2023-09-17

## 2023-09-14 RX ORDER — TELMISARTAN 40 MG/1
40 TABLET ORAL DAILY
Qty: 90 TABLET | Refills: 0 | Status: SHIPPED | OUTPATIENT
Start: 2023-09-14

## 2023-09-14 RX ORDER — MULTIVIT-MIN/IRON/FOLIC ACID/K 18-600-40
1 CAPSULE ORAL DAILY
Qty: 90 CAPSULE | Refills: 0 | Status: SHIPPED | OUTPATIENT
Start: 2023-09-14

## 2023-09-14 ASSESSMENT — PATIENT HEALTH QUESTIONNAIRE - PHQ9
5. POOR APPETITE OR OVEREATING: 3
SUM OF ALL RESPONSES TO PHQ QUESTIONS 1-9: 15
3. TROUBLE FALLING OR STAYING ASLEEP: 3
10. IF YOU CHECKED OFF ANY PROBLEMS, HOW DIFFICULT HAVE THESE PROBLEMS MADE IT FOR YOU TO DO YOUR WORK, TAKE CARE OF THINGS AT HOME, OR GET ALONG WITH OTHER PEOPLE: 0
2. FEELING DOWN, DEPRESSED OR HOPELESS: 3
9. THOUGHTS THAT YOU WOULD BE BETTER OFF DEAD, OR OF HURTING YOURSELF: 0
SUM OF ALL RESPONSES TO PHQ QUESTIONS 1-9: 15
8. MOVING OR SPEAKING SO SLOWLY THAT OTHER PEOPLE COULD HAVE NOTICED. OR THE OPPOSITE, BEING SO FIGETY OR RESTLESS THAT YOU HAVE BEEN MOVING AROUND A LOT MORE THAN USUAL: 0
1. LITTLE INTEREST OR PLEASURE IN DOING THINGS: 3
SUM OF ALL RESPONSES TO PHQ QUESTIONS 1-9: 15
7. TROUBLE CONCENTRATING ON THINGS, SUCH AS READING THE NEWSPAPER OR WATCHING TELEVISION: 0
SUM OF ALL RESPONSES TO PHQ9 QUESTIONS 1 & 2: 6
4. FEELING TIRED OR HAVING LITTLE ENERGY: 3
SUM OF ALL RESPONSES TO PHQ QUESTIONS 1-9: 15

## 2023-09-14 ASSESSMENT — LIFESTYLE VARIABLES: HOW OFTEN DO YOU HAVE A DRINK CONTAINING ALCOHOL: NEVER

## 2023-09-14 NOTE — PROGRESS NOTES
Medicare Annual Wellness Visit    Jaime Salazar is here for Follow-up ([address]) and Hypertension      Recommendations for Preventive Services Due: see orders and patient instructions/AVS.  Recommended screening schedule for the next 5-10 years is provided to the patient in written form: see Patient Instructions/AVS.     Return in about 2 months (around 11/14/2023). Subjective     LAST PHYSICAL > 1 YR    The following acute and/or chronic problems were also addressed today:    HTN -  TAKING MEDS. BP NOTED. ? DIET / EXERCISE COMPLIANCE. NO HEADACHE , OCC DIZZINESS. DEMENTIA - ? MED COMPLIANCE. MEMORY CHANGES PER SISTER. DEPRESSION - PERSISTENT. TAKING MED. + CRYING SPELLS PER PT.  NO INSOMNIA . DENIES SUICIDAL / NO HOMICIDAL THOUGHTS / IDEATION  HLP - TAKING MED . + EXERCISE / DIET  COMPLIANCE . NO MUSCLE CRAMPS / NO MUSCLE ACHES. LAB D/W PT  THROMBOCYTOPENIA - CHRONIC. OCC BRUISING. VIT D DEF -  TAKING MED.  LABS D/W PT        DENIES CP, No SOB, No PALPITATIONS, No COUGH, NO F/C  No ABD PAIN, OCC NAUSEA, NO VOMITING, No DIARRHEA, No CONSTIPATION, No MELENA, No HEMATOCHEZIA  No DYSURIA, ? URI. FREQ, No URGENCY, No HEMATURIA    Patient's complete Health Risk Assessment and screening values have been reviewed and are found in Flowsheets. The following problems were reviewed today and where indicated follow up appointments were made and/or referrals ordered. Positive Risk Factor Screenings with Interventions:       Cognitive:    Words recalled: 3 Words Recalled           Total Score Interpretation: Normal Mini-Cog      Interventions:  ADVISED ON CLOSE SUPERVISION    Depression:  PHQ-2 Score: 6  PHQ-9 Total Score: 15    Interpretation:   1-4 = minimal  5-9 = mild  10-14 = moderate  15-19 = moderately severe  20-27 = severe  Interventions:  Medications adjusted: SEE NOTE          General HRA Questions:  Select all that apply: (!) Loneliness, Stress, Social

## 2023-09-15 DIAGNOSIS — E78.2 MIXED HYPERLIPIDEMIA: ICD-10-CM

## 2023-09-18 RX ORDER — ROSUVASTATIN CALCIUM 40 MG/1
TABLET, COATED ORAL
Qty: 60 TABLET | OUTPATIENT
Start: 2023-09-18

## 2023-10-12 RX ORDER — MEMANTINE HYDROCHLORIDE 14 MG/1
1 CAPSULE, EXTENDED RELEASE ORAL DAILY
Qty: 30 CAPSULE | OUTPATIENT
Start: 2023-10-12

## 2023-10-12 RX ORDER — ESCITALOPRAM OXALATE 10 MG/1
10 TABLET ORAL DAILY
Qty: 30 TABLET | OUTPATIENT
Start: 2023-10-12

## 2024-01-08 DIAGNOSIS — F02.80 LATE ONSET ALZHEIMER'S DISEASE WITHOUT BEHAVIORAL DISTURBANCE (HCC): ICD-10-CM

## 2024-01-08 DIAGNOSIS — G30.1 LATE ONSET ALZHEIMER'S DISEASE WITHOUT BEHAVIORAL DISTURBANCE (HCC): ICD-10-CM

## 2024-01-08 RX ORDER — MEMANTINE HYDROCHLORIDE 21 MG/1
21 CAPSULE, EXTENDED RELEASE ORAL DAILY
Qty: 90 CAPSULE | Refills: 0 | Status: SHIPPED | OUTPATIENT
Start: 2024-01-08

## 2024-03-04 DIAGNOSIS — E78.2 MIXED HYPERLIPIDEMIA: ICD-10-CM

## 2024-03-04 RX ORDER — ROSUVASTATIN CALCIUM 40 MG/1
TABLET, COATED ORAL
Qty: 30 TABLET | Refills: 0 | Status: SHIPPED | OUTPATIENT
Start: 2024-03-04

## 2024-03-04 NOTE — TELEPHONE ENCOUNTER
Medication:   Requested Prescriptions     Pending Prescriptions Disp Refills    rosuvastatin (CRESTOR) 40 MG tablet [Pharmacy Med Name: ROSUVASTATIN CALCIUM 40 MG TAB] 60 tablet      Sig: TAKE ONE TABLET BY MOUTH ONCE NIGHTLY        Last Filled:  9/14/23    Last appt: 9/14/2023   Next appt: Visit date not found   Return in about 2 months (around 11/14/2023).  Last OARRS:        No data to display

## 2024-04-02 DIAGNOSIS — F33.1 MODERATE EPISODE OF RECURRENT MAJOR DEPRESSIVE DISORDER (HCC): ICD-10-CM

## 2024-04-02 RX ORDER — ESCITALOPRAM OXALATE 20 MG/1
20 TABLET ORAL DAILY
Qty: 30 TABLET | Refills: 0 | Status: SHIPPED | OUTPATIENT
Start: 2024-04-02

## 2024-04-02 NOTE — TELEPHONE ENCOUNTER
Medication:   Requested Prescriptions     Pending Prescriptions Disp Refills    escitalopram (LEXAPRO) 20 MG tablet [Pharmacy Med Name: ESCITALOPRAM 20 MG TABLET] 30 tablet      Sig: TAKE 1 TABLET BY MOUTH DAILY     Last Filled:  # 90 on 09/14/23    Last appt: 9/14/2023   Next appt: Visit date not found    Last OARRS:        No data to display

## 2024-05-13 ENCOUNTER — OFFICE VISIT (OUTPATIENT)
Dept: INTERNAL MEDICINE CLINIC | Age: 89
End: 2024-05-13
Payer: MEDICARE

## 2024-05-13 VITALS
OXYGEN SATURATION: 99 % | SYSTOLIC BLOOD PRESSURE: 126 MMHG | DIASTOLIC BLOOD PRESSURE: 78 MMHG | HEART RATE: 66 BPM | BODY MASS INDEX: 18.24 KG/M2 | WEIGHT: 113 LBS | TEMPERATURE: 98.3 F

## 2024-05-13 DIAGNOSIS — E78.2 MIXED HYPERLIPIDEMIA: ICD-10-CM

## 2024-05-13 DIAGNOSIS — F33.1 MODERATE EPISODE OF RECURRENT MAJOR DEPRESSIVE DISORDER (HCC): ICD-10-CM

## 2024-05-13 DIAGNOSIS — E55.9 VITAMIN D DEFICIENCY: ICD-10-CM

## 2024-05-13 DIAGNOSIS — R01.1 HEART MURMUR: ICD-10-CM

## 2024-05-13 DIAGNOSIS — G30.1 LATE ONSET ALZHEIMER'S DISEASE WITHOUT BEHAVIORAL DISTURBANCE (HCC): Primary | ICD-10-CM

## 2024-05-13 DIAGNOSIS — I10 PRIMARY HYPERTENSION: ICD-10-CM

## 2024-05-13 DIAGNOSIS — F02.80 LATE ONSET ALZHEIMER'S DISEASE WITHOUT BEHAVIORAL DISTURBANCE (HCC): Primary | ICD-10-CM

## 2024-05-13 DIAGNOSIS — D69.6 THROMBOCYTOPENIA (HCC): ICD-10-CM

## 2024-05-13 PROBLEM — I71.40 ABDOMINAL AORTIC ANEURYSM (AAA) WITHOUT RUPTURE, UNSPECIFIED PART (HCC): Status: RESOLVED | Noted: 2023-06-13 | Resolved: 2024-05-13

## 2024-05-13 PROCEDURE — G2211 COMPLEX E/M VISIT ADD ON: HCPCS | Performed by: INTERNAL MEDICINE

## 2024-05-13 PROCEDURE — 99215 OFFICE O/P EST HI 40 MIN: CPT | Performed by: INTERNAL MEDICINE

## 2024-05-13 PROCEDURE — G8419 CALC BMI OUT NRM PARAM NOF/U: HCPCS | Performed by: INTERNAL MEDICINE

## 2024-05-13 PROCEDURE — 1123F ACP DISCUSS/DSCN MKR DOCD: CPT | Performed by: INTERNAL MEDICINE

## 2024-05-13 PROCEDURE — G8427 DOCREV CUR MEDS BY ELIG CLIN: HCPCS | Performed by: INTERNAL MEDICINE

## 2024-05-13 PROCEDURE — 1036F TOBACCO NON-USER: CPT | Performed by: INTERNAL MEDICINE

## 2024-05-13 PROCEDURE — 1090F PRES/ABSN URINE INCON ASSESS: CPT | Performed by: INTERNAL MEDICINE

## 2024-05-13 RX ORDER — DONEPEZIL HYDROCHLORIDE 10 MG/1
10 TABLET, FILM COATED ORAL NIGHTLY
Qty: 90 TABLET | Refills: 0 | Status: SHIPPED | OUTPATIENT
Start: 2024-05-13

## 2024-05-13 RX ORDER — TELMISARTAN 40 MG/1
40 TABLET ORAL DAILY
Qty: 90 TABLET | Refills: 0 | Status: SHIPPED | OUTPATIENT
Start: 2024-05-13

## 2024-05-13 RX ORDER — AMLODIPINE BESYLATE 5 MG/1
5 TABLET ORAL DAILY
Qty: 90 TABLET | Refills: 0 | Status: SHIPPED | OUTPATIENT
Start: 2024-05-13

## 2024-05-13 RX ORDER — ESCITALOPRAM OXALATE 20 MG/1
20 TABLET ORAL DAILY
Qty: 90 TABLET | Refills: 0 | Status: SHIPPED | OUTPATIENT
Start: 2024-05-13

## 2024-05-13 RX ORDER — MEMANTINE HYDROCHLORIDE 21 MG/1
21 CAPSULE, EXTENDED RELEASE ORAL DAILY
Qty: 90 CAPSULE | Refills: 0 | Status: SHIPPED | OUTPATIENT
Start: 2024-05-13

## 2024-05-13 RX ORDER — ROSUVASTATIN CALCIUM 40 MG/1
40 TABLET, COATED ORAL NIGHTLY
Qty: 90 TABLET | Refills: 0 | Status: SHIPPED | OUTPATIENT
Start: 2024-05-13

## 2024-05-13 SDOH — ECONOMIC STABILITY: FOOD INSECURITY: WITHIN THE PAST 12 MONTHS, YOU WORRIED THAT YOUR FOOD WOULD RUN OUT BEFORE YOU GOT MONEY TO BUY MORE.: NEVER TRUE

## 2024-05-13 SDOH — ECONOMIC STABILITY: INCOME INSECURITY: HOW HARD IS IT FOR YOU TO PAY FOR THE VERY BASICS LIKE FOOD, HOUSING, MEDICAL CARE, AND HEATING?: NOT HARD AT ALL

## 2024-05-13 SDOH — ECONOMIC STABILITY: FOOD INSECURITY: WITHIN THE PAST 12 MONTHS, THE FOOD YOU BOUGHT JUST DIDN'T LAST AND YOU DIDN'T HAVE MONEY TO GET MORE.: NEVER TRUE

## 2024-05-13 NOTE — PATIENT INSTRUCTIONS
TAKE MED AS ADVISED    DIET/ EXERCISE.    FOLLOW UP WITHIN 4 MONTHS / AS NEEDED    FOLLOW UP FOR FASTING LABS, ECHO, SPEECH THERAPY    [facility] Locations - No appointment necessary.  ? indicates the location is open Saturdays in addition to Monday through Friday.   Call your preferred location for test preparation, business hours and other information you need.   [facility] accepts all insurances.  [place]    ? Harry   [address]   [address]   [place] [address]    Ph: [phone]  [facility]   [address] [place] [address]    Ph: [phone]   ? Jose Daniel   [address]    [place] [address]    Ph: [phone]     [facility]   [address] [place] [address]    Ph: [phone] ? [place]   [address] [place] [address]   Ph: [phone]  ? [facility]   [address] [place] [address]    Ph: [phone]      [facility]   [address] [place] [address]   Ph: [phone]    [place]TH    ? [facility]   [address] [place] [address]    Ph: [phone]  [facility]   [address] [place] [address]   Ph: [phone]  [address]   [facility], [address]    PH: [phone]    [facility]   [address] [place] [address]    Ph: [phone]  [place]  [address] [place] [address]  Ph: [phone]  [facility]   [address] [place] [address]    Ph: [phone]

## 2024-05-13 NOTE — PROGRESS NOTES
MD Sarina   dextran 70-hypromellose (TEARS PURE) 0.1-0.3 % SOLN opthalmic solution Place 1 drop into both eyes as needed (dry eyes) Yes ProviderKeith MD   aspirin 81 MG tablet Take 1 tablet by mouth daily Yes Provider, MD Keith   Nutritional Supplements (ENSURE PLUS HN) LIQD Take 1 Can by mouth 3 times daily  Sairna Mckeon MD       ROS: COMPREHENSIVE ROS AS IN HX, REST -VE  History obtained from chart review, son and the patient       OBJECTIVE:   NURSING NOTE AND VITALS REVIEWED  /64   Pulse 66   Temp 98.3 °F (36.8 °C) (Oral)   Wt 51.3 kg (113 lb)   SpO2 99%   BMI 18.24 kg/m²     NO ACUTE DISTRESS    REPEAT BP: 126/78 (RT), NO ORTHOSTASIS     Body mass index is 18.24 kg/m².     HEENT: NO PALLOR, ANICTERIC, PERRLA, EOMI, NO CONJUNCTIVAL ERYTHEMA,                 NO SINUS TENDERNESS  NECK:  SUPPLE, TRACHEA MIDLINE, NT, NO JVD, NO CB, NO LA, NO TM, NO STIFFNESS  CHEST: RESPY EFFORT NL, GOOD AE, NO W/R/C  HEART: S1S2+ REG, + MURMUR, NO G/R  ABD: SOFT, NT, NO HSM, BS+  EXT: NO EDEMA, NT, PULSES +. TWAN'S -VE  NEURO: ALERT AND ORIENTED X 2 - CONFUSED WITH DATE, NO MENINGEAL SIGNS, NO TREMORS, NL GAIT, NO NEW FOCAL DEFICITS  PSYCH: OCC DEPRESSED AFFECT. RECALL 1/3  BACK: NT, NO ROM, NO CVA TENDERNESS  SKIN: NO BRUISING NOTED     PREVIOUS LABS REVIEWED AND D/W PT / LAST LABS NOT DONE    ASSESSMENT / PLAN:     Diagnosis Orders   1. Late onset Alzheimer's disease without behavioral disturbance (HCC)  COUNSELLED. CONTINUE ARICEPT AND NAMENDA XR  D/W PT AND SON - ADVISED ON SUPERVISED CARE.   ADVISED ENCOURAGE INTERACTIVE ACTIVITY  F/U LABS  REFER SPEECH THERAPY  MAKE CHANGES AS NEEDED.       2. Primary hypertension  COUNSELLED. CONTROLLED. CONTINUE MEDS.   ADVISED LOW NA+ / DASH DIET/ EXERCISE. MONITOR. GOAL </= 130/80  F/U LABS  MAKE CHANGES AS NEEDED.       3. Mixed hyperlipidemia  COUNSELLED. ADVISED LOW FAT / CHOL DIET/ EXERCISE.  MONITOR ON MED. F/U LABS.  GOALS D/W PT.  MAKE CHANGES AS

## 2024-06-02 DIAGNOSIS — F33.1 MODERATE EPISODE OF RECURRENT MAJOR DEPRESSIVE DISORDER (HCC): ICD-10-CM

## 2024-06-05 RX ORDER — ESCITALOPRAM OXALATE 20 MG/1
20 TABLET ORAL DAILY
Qty: 30 TABLET | Refills: 0 | Status: SHIPPED | OUTPATIENT
Start: 2024-06-05

## 2024-06-05 NOTE — TELEPHONE ENCOUNTER
Medication:   Requested Prescriptions     Pending Prescriptions Disp Refills    escitalopram (LEXAPRO) 20 MG tablet [Pharmacy Med Name: ESCITALOPRAM 20 MG TABLET] 30 tablet      Sig: TAKE 1 TABLET BY MOUTH DAILY        Last Filled:05/13/2024    Patient Phone Number: [phone] (home) [phone] (work)    Last appt: 5/13/2024   Next appt: 9/13/2024    Last OARRS:        No data to display

## 2024-06-13 ENCOUNTER — HOSPITAL ENCOUNTER (OUTPATIENT)
Age: 89
Discharge: HOME | End: 2024-06-15
Payer: MEDICARE

## 2024-06-13 VITALS
SYSTOLIC BLOOD PRESSURE: 126 MMHG | DIASTOLIC BLOOD PRESSURE: 78 MMHG | WEIGHT: 113 LBS | HEIGHT: 66 IN | BODY MASS INDEX: 18.16 KG/M2

## 2024-06-13 DIAGNOSIS — R01.1 HEART MURMUR: ICD-10-CM

## 2024-06-13 LAB
ECHO AO ROOT DIAM: 2.7 CM
ECHO AO ROOT INDEX: 1.72 CM/M2
ECHO AV AREA PEAK VELOCITY: 1.4 CM2
ECHO AV AREA VTI: 1.3 CM2
ECHO AV AREA/BSA PEAK VELOCITY: 0.9 CM2/M2
ECHO AV AREA/BSA VTI: 0.8 CM2/M2
ECHO AV MEAN GRADIENT: 8 MMHG
ECHO AV MEAN VELOCITY: 1.3 M/S
ECHO AV PEAK GRADIENT: 16 MMHG
ECHO AV PEAK VELOCITY: 2 M/S
ECHO AV VELOCITY RATIO: 0.45
ECHO AV VTI: 42.2 CM
ECHO BSA: 1.54 M2
ECHO LA AREA 2C: 11.9 CM2
ECHO LA AREA 4C: 16.3 CM2
ECHO LA DIAMETER INDEX: 1.85 CM/M2
ECHO LA DIAMETER: 2.9 CM
ECHO LA MAJOR AXIS: 4.6 CM
ECHO LA MINOR AXIS: 3.7 CM
ECHO LA TO AORTIC ROOT RATIO: 1.07
ECHO LA VOL BP: 39 ML (ref 22–52)
ECHO LA VOL MOD A2C: 32 ML (ref 22–52)
ECHO LA VOL MOD A4C: 39 ML (ref 22–52)
ECHO LA VOL/BSA BIPLANE: 25 ML/M2 (ref 16–34)
ECHO LA VOLUME INDEX MOD A2C: 20 ML/M2 (ref 16–34)
ECHO LA VOLUME INDEX MOD A4C: 25 ML/M2 (ref 16–34)
ECHO LV E' LATERAL VELOCITY: 11 CM/S
ECHO LV E' SEPTAL VELOCITY: 6 CM/S
ECHO LV EDV A2C: 70 ML
ECHO LV EDV A4C: 83 ML
ECHO LV EDV INDEX A4C: 53 ML/M2
ECHO LV EDV NDEX A2C: 45 ML/M2
ECHO LV EJECTION FRACTION A2C: 58 %
ECHO LV EJECTION FRACTION A4C: 71 %
ECHO LV EJECTION FRACTION BIPLANE: 66 % (ref 55–100)
ECHO LV ESV A2C: 29 ML
ECHO LV ESV A4C: 25 ML
ECHO LV ESV INDEX A2C: 18 ML/M2
ECHO LV ESV INDEX A4C: 16 ML/M2
ECHO LV FRACTIONAL SHORTENING: 30 % (ref 28–44)
ECHO LV INTERNAL DIMENSION DIASTOLE INDEX: 2.36 CM/M2
ECHO LV INTERNAL DIMENSION DIASTOLIC: 3.7 CM (ref 3.9–5.3)
ECHO LV INTERNAL DIMENSION SYSTOLIC INDEX: 1.66 CM/M2
ECHO LV INTERNAL DIMENSION SYSTOLIC: 2.6 CM
ECHO LV IVSD: 0.9 CM (ref 0.6–0.9)
ECHO LV MASS 2D: 96.9 G (ref 67–162)
ECHO LV MASS INDEX 2D: 61.7 G/M2 (ref 43–95)
ECHO LV POSTERIOR WALL DIASTOLIC: 0.9 CM (ref 0.6–0.9)
ECHO LV RELATIVE WALL THICKNESS RATIO: 0.49
ECHO LVOT AREA: 3.1 CM2
ECHO LVOT AV VTI INDEX: 0.42
ECHO LVOT DIAM: 2 CM
ECHO LVOT MEAN GRADIENT: 1 MMHG
ECHO LVOT PEAK GRADIENT: 3 MMHG
ECHO LVOT PEAK VELOCITY: 0.9 M/S
ECHO LVOT STROKE VOLUME INDEX: 35.6 ML/M2
ECHO LVOT SV: 55.9 ML
ECHO LVOT VTI: 17.8 CM
ECHO MV A VELOCITY: 0.8 M/S
ECHO MV E VELOCITY: 0.61 M/S
ECHO MV E/A RATIO: 0.76
ECHO MV E/E' LATERAL: 5.55
ECHO MV E/E' RATIO (AVERAGED): 7.86
ECHO MV E/E' SEPTAL: 10.17
ECHO PV MAX VELOCITY: 0.9 M/S
ECHO PV MEAN GRADIENT: 2 MMHG
ECHO PV MEAN VELOCITY: 0.6 M/S
ECHO PV PEAK GRADIENT: 3 MMHG
ECHO PV VTI: 20 CM
ECHO RA AREA 4C: 11.4 CM2
ECHO RA END SYSTOLIC VOLUME APICAL 4 CHAMBER INDEX BSA: 14 ML/M2
ECHO RA VOLUME: 22 ML
ECHO RV FREE WALL PEAK S': 14 CM/S
ECHO RV TAPSE: 2 CM (ref 1.7–?)

## 2024-06-13 PROCEDURE — 93306 TTE W/DOPPLER COMPLETE: CPT

## 2024-06-24 DIAGNOSIS — I10 PRIMARY HYPERTENSION: ICD-10-CM

## 2024-06-24 RX ORDER — AMLODIPINE BESYLATE 5 MG/1
5 TABLET ORAL DAILY
Qty: 30 TABLET | Refills: 0 | Status: SHIPPED | OUTPATIENT
Start: 2024-06-24

## 2024-07-08 ENCOUNTER — TELEPHONE (OUTPATIENT)
Dept: INTERNAL MEDICINE CLINIC | Age: 89
End: 2024-07-08

## 2024-07-16 DIAGNOSIS — F33.1 MODERATE EPISODE OF RECURRENT MAJOR DEPRESSIVE DISORDER (HCC): ICD-10-CM

## 2024-07-16 DIAGNOSIS — I10 PRIMARY HYPERTENSION: ICD-10-CM

## 2024-07-16 RX ORDER — TELMISARTAN 40 MG/1
40 TABLET ORAL DAILY
Qty: 30 TABLET | Refills: 0 | Status: SHIPPED | OUTPATIENT
Start: 2024-07-16

## 2024-07-16 RX ORDER — AMLODIPINE BESYLATE 5 MG/1
5 TABLET ORAL DAILY
Qty: 30 TABLET | Refills: 0 | Status: SHIPPED | OUTPATIENT
Start: 2024-07-16

## 2024-07-16 RX ORDER — ESCITALOPRAM OXALATE 20 MG/1
20 TABLET ORAL DAILY
Qty: 30 TABLET | Refills: 0 | Status: SHIPPED | OUTPATIENT
Start: 2024-07-16

## 2024-07-21 DIAGNOSIS — E78.2 MIXED HYPERLIPIDEMIA: ICD-10-CM

## 2024-07-22 RX ORDER — ROSUVASTATIN CALCIUM 40 MG/1
40 TABLET, COATED ORAL NIGHTLY
Qty: 30 TABLET | Refills: 0 | Status: SHIPPED | OUTPATIENT
Start: 2024-07-22

## 2024-08-08 ENCOUNTER — HOSPITAL ENCOUNTER (INPATIENT)
Age: 89
LOS: 1 days | Discharge: OTHER INSTITUTION | End: 2024-08-09
Attending: STUDENT IN AN ORGANIZED HEALTH CARE EDUCATION/TRAINING PROGRAM | Admitting: INTERNAL MEDICINE
Payer: MEDICARE

## 2024-08-08 ENCOUNTER — APPOINTMENT (OUTPATIENT)
Dept: GENERAL RADIOLOGY | Age: 89
End: 2024-08-08
Payer: MEDICARE

## 2024-08-08 DIAGNOSIS — S72.141A CLOSED INTERTROCHANTERIC FRACTURE OF HIP, RIGHT, INITIAL ENCOUNTER (HCC): Primary | ICD-10-CM
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
## 2024-08-08 PROBLEM — S72.001A CLOSED RIGHT HIP FRACTURE, INITIAL ENCOUNTER (HCC): Status: ACTIVE | Noted: 2024-08-08

## 2024-08-08 PROBLEM — R79.89 ELEVATED TROPONIN: Status: ACTIVE | Noted: 2024-08-08

## 2024-08-08 PROBLEM — M62.82 RHABDOMYOLYSIS: Status: ACTIVE | Noted: 2024-08-08

## 2024-08-08 LAB
ANION GAP SERPL CALCULATED.3IONS-SCNC: 12 MMOL/L (ref 3–16)
APTT BLD: 28.1 SEC (ref 22.1–36.4)
BASOPHILS # BLD: 0 K/UL (ref 0–0.2)
BASOPHILS NFR BLD: 0.1 %
BUN SERPL-MCNC: 14 MG/DL (ref 7–20)
CALCIUM SERPL-MCNC: 9 MG/DL (ref 8.3–10.6)
CHLORIDE SERPL-SCNC: 106 MMOL/L (ref 99–110)
CK SERPL-CCNC: 385 U/L (ref 26–192)
CO2 SERPL-SCNC: 21 MMOL/L (ref 21–32)
CREAT SERPL-MCNC: 0.9 MG/DL (ref 0.6–1.2)
DEPRECATED RDW RBC AUTO: 14.6 % (ref 12.4–15.4)
EKG ATRIAL RATE: 326 BPM
EKG DIAGNOSIS: NORMAL
EKG Q-T INTERVAL: 368 MS
EKG QRS DURATION: 92 MS
EKG QTC CALCULATION (BAZETT): 370 MS
EKG R AXIS: -45 DEGREES
EKG T AXIS: 19 DEGREES
EKG VENTRICULAR RATE: 61 BPM
EOSINOPHIL # BLD: 0 K/UL (ref 0–0.6)
EOSINOPHIL NFR BLD: 0 %
GFR SERPLBLD CREATININE-BSD FMLA CKD-EPI: 62 ML/MIN/{1.73_M2}
GLUCOSE SERPL-MCNC: 118 MG/DL (ref 70–99)
HCT VFR BLD AUTO: 34.6 % (ref 36–48)
HGB BLD-MCNC: 10.8 G/DL (ref 12–16)
INR PPP: 1.14 (ref 0.85–1.15)
LYMPHOCYTES # BLD: 0.7 K/UL (ref 1–5.1)
LYMPHOCYTES NFR BLD: 8.2 %
MCH RBC QN AUTO: 26.7 PG (ref 26–34)
MCHC RBC AUTO-ENTMCNC: 31.3 G/DL (ref 31–36)
MCV RBC AUTO: 85.3 FL (ref 80–100)
MONOCYTES # BLD: 0.7 K/UL (ref 0–1.3)
MONOCYTES NFR BLD: 8.1 %
NEUTROPHILS # BLD: 7.1 K/UL (ref 1.7–7.7)
NEUTROPHILS NFR BLD: 83.6 %
PLATELET # BLD AUTO: 103 K/UL (ref 135–450)
PMV BLD AUTO: 10.7 FL (ref 5–10.5)
POTASSIUM SERPL-SCNC: 4 MMOL/L (ref 3.5–5.1)
PROTHROMBIN TIME: 14.9 SEC (ref 11.9–14.9)
RBC # BLD AUTO: 4.05 M/UL (ref 4–5.2)
SODIUM SERPL-SCNC: 139 MMOL/L (ref 136–145)
TROPONIN, HIGH SENSITIVITY: 27 NG/L (ref 0–14)
TROPONIN, HIGH SENSITIVITY: 29 NG/L (ref 0–14)
WBC # BLD AUTO: 8.5 K/UL (ref 4–11)

## 2024-08-08 PROCEDURE — 6370000000 HC RX 637 (ALT 250 FOR IP): Performed by: INTERNAL MEDICINE

## 2024-08-08 PROCEDURE — 93005 ELECTROCARDIOGRAM TRACING: CPT | Performed by: PHYSICIAN ASSISTANT

## 2024-08-08 PROCEDURE — 85730 THROMBOPLASTIN TIME PARTIAL: CPT

## 2024-08-08 PROCEDURE — 85610 PROTHROMBIN TIME: CPT

## 2024-08-08 PROCEDURE — 80048 BASIC METABOLIC PNL TOTAL CA: CPT

## 2024-08-08 PROCEDURE — 85025 COMPLETE CBC W/AUTO DIFF WBC: CPT

## 2024-08-08 PROCEDURE — 73030 X-RAY EXAM OF SHOULDER: CPT

## 2024-08-08 PROCEDURE — 84484 ASSAY OF TROPONIN QUANT: CPT

## 2024-08-08 PROCEDURE — 71045 X-RAY EXAM CHEST 1 VIEW: CPT

## 2024-08-08 PROCEDURE — 99285 EMERGENCY DEPT VISIT HI MDM: CPT

## 2024-08-08 PROCEDURE — 6360000002 HC RX W HCPCS: Performed by: INTERNAL MEDICINE

## 2024-08-08 PROCEDURE — 73502 X-RAY EXAM HIP UNI 2-3 VIEWS: CPT

## 2024-08-08 PROCEDURE — 1200000000 HC SEMI PRIVATE

## 2024-08-08 PROCEDURE — 82550 ASSAY OF CK (CPK): CPT

## 2024-08-08 RX ORDER — POTASSIUM CHLORIDE 7.45 MG/ML
10 INJECTION INTRAVENOUS PRN
Status: DISCONTINUED | OUTPATIENT
Start: 2024-08-08 | End: 2024-08-10 | Stop reason: HOSPADM

## 2024-08-08 RX ORDER — OXYCODONE HYDROCHLORIDE AND ACETAMINOPHEN 5; 325 MG/1; MG/1
1 TABLET ORAL EVERY 4 HOURS PRN
Status: DISCONTINUED | OUTPATIENT
Start: 2024-08-08 | End: 2024-08-10 | Stop reason: HOSPADM

## 2024-08-08 RX ORDER — VITAMIN B COMPLEX
2 TABLET ORAL DAILY
Status: DISCONTINUED | OUTPATIENT
Start: 2024-08-08 | End: 2024-08-10 | Stop reason: HOSPADM

## 2024-08-08 RX ORDER — LOSARTAN POTASSIUM 25 MG/1
50 TABLET ORAL DAILY
Status: DISCONTINUED | OUTPATIENT
Start: 2024-08-08 | End: 2024-08-10 | Stop reason: HOSPADM

## 2024-08-08 RX ORDER — ONDANSETRON 2 MG/ML
4 INJECTION INTRAMUSCULAR; INTRAVENOUS EVERY 6 HOURS PRN
Status: DISCONTINUED | OUTPATIENT
Start: 2024-08-08 | End: 2024-08-10 | Stop reason: HOSPADM

## 2024-08-08 RX ORDER — ACETAMINOPHEN 650 MG/1
650 SUPPOSITORY RECTAL EVERY 6 HOURS PRN
Status: DISCONTINUED | OUTPATIENT
Start: 2024-08-08 | End: 2024-08-10 | Stop reason: HOSPADM

## 2024-08-08 RX ORDER — SODIUM CHLORIDE 0.9 % (FLUSH) 0.9 %
5-40 SYRINGE (ML) INJECTION PRN
Status: DISCONTINUED | OUTPATIENT
Start: 2024-08-08 | End: 2024-08-10 | Stop reason: HOSPADM

## 2024-08-08 RX ORDER — SODIUM CHLORIDE 9 MG/ML
INJECTION, SOLUTION INTRAVENOUS CONTINUOUS
Status: DISCONTINUED | OUTPATIENT
Start: 2024-08-09 | End: 2024-08-10 | Stop reason: HOSPADM

## 2024-08-08 RX ORDER — ENOXAPARIN SODIUM 100 MG/ML
40 INJECTION SUBCUTANEOUS DAILY
Status: DISCONTINUED | OUTPATIENT
Start: 2024-08-08 | End: 2024-08-10 | Stop reason: HOSPADM

## 2024-08-08 RX ORDER — MORPHINE SULFATE 2 MG/ML
4 INJECTION, SOLUTION INTRAMUSCULAR; INTRAVENOUS
Status: DISCONTINUED | OUTPATIENT
Start: 2024-08-08 | End: 2024-08-10 | Stop reason: HOSPADM

## 2024-08-08 RX ORDER — ASPIRIN 81 MG/1
81 TABLET ORAL DAILY
Status: DISCONTINUED | OUTPATIENT
Start: 2024-08-08 | End: 2024-08-09

## 2024-08-08 RX ORDER — SODIUM CHLORIDE 9 MG/ML
INJECTION, SOLUTION INTRAVENOUS PRN
Status: DISCONTINUED | OUTPATIENT
Start: 2024-08-08 | End: 2024-08-10 | Stop reason: HOSPADM

## 2024-08-08 RX ORDER — DONEPEZIL HYDROCHLORIDE 10 MG/1
10 TABLET, FILM COATED ORAL NIGHTLY
Status: DISCONTINUED | OUTPATIENT
Start: 2024-08-08 | End: 2024-08-10 | Stop reason: HOSPADM

## 2024-08-08 RX ORDER — MEMANTINE HYDROCHLORIDE 10 MG/1
10 TABLET ORAL 2 TIMES DAILY
Status: DISCONTINUED | OUTPATIENT
Start: 2024-08-08 | End: 2024-08-10 | Stop reason: HOSPADM

## 2024-08-08 RX ORDER — SODIUM CHLORIDE 0.9 % (FLUSH) 0.9 %
5-40 SYRINGE (ML) INJECTION EVERY 12 HOURS SCHEDULED
Status: DISCONTINUED | OUTPATIENT
Start: 2024-08-08 | End: 2024-08-10 | Stop reason: HOSPADM

## 2024-08-08 RX ORDER — POLYETHYLENE GLYCOL 3350 17 G/17G
17 POWDER, FOR SOLUTION ORAL DAILY PRN
Status: DISCONTINUED | OUTPATIENT
Start: 2024-08-08 | End: 2024-08-10 | Stop reason: HOSPADM

## 2024-08-08 RX ORDER — POTASSIUM CHLORIDE 20 MEQ/1
40 TABLET, EXTENDED RELEASE ORAL PRN
Status: DISCONTINUED | OUTPATIENT
Start: 2024-08-08 | End: 2024-08-10 | Stop reason: HOSPADM

## 2024-08-08 RX ORDER — M-VIT,TX,IRON,MINS/CALC/FOLIC 27MG-0.4MG
1 TABLET ORAL DAILY
Status: DISCONTINUED | OUTPATIENT
Start: 2024-08-09 | End: 2024-08-10 | Stop reason: HOSPADM

## 2024-08-08 RX ORDER — ACETAMINOPHEN 325 MG/1
650 TABLET ORAL EVERY 6 HOURS PRN
Status: DISCONTINUED | OUTPATIENT
Start: 2024-08-08 | End: 2024-08-10 | Stop reason: HOSPADM

## 2024-08-08 RX ORDER — ESCITALOPRAM OXALATE 20 MG/1
20 TABLET ORAL DAILY
Status: DISCONTINUED | OUTPATIENT
Start: 2024-08-09 | End: 2024-08-10 | Stop reason: HOSPADM

## 2024-08-08 RX ORDER — ONDANSETRON 4 MG/1
4 TABLET, ORALLY DISINTEGRATING ORAL EVERY 8 HOURS PRN
Status: DISCONTINUED | OUTPATIENT
Start: 2024-08-08 | End: 2024-08-10 | Stop reason: HOSPADM

## 2024-08-08 RX ORDER — MAGNESIUM SULFATE IN WATER 40 MG/ML
2000 INJECTION, SOLUTION INTRAVENOUS PRN
Status: DISCONTINUED | OUTPATIENT
Start: 2024-08-08 | End: 2024-08-10 | Stop reason: HOSPADM

## 2024-08-08 RX ORDER — MORPHINE SULFATE 2 MG/ML
2 INJECTION, SOLUTION INTRAMUSCULAR; INTRAVENOUS
Status: DISCONTINUED | OUTPATIENT
Start: 2024-08-08 | End: 2024-08-10 | Stop reason: HOSPADM

## 2024-08-08 RX ORDER — ROSUVASTATIN CALCIUM 20 MG/1
40 TABLET, COATED ORAL NIGHTLY
Status: DISCONTINUED | OUTPATIENT
Start: 2024-08-08 | End: 2024-08-10 | Stop reason: HOSPADM

## 2024-08-08 RX ORDER — AMLODIPINE BESYLATE 5 MG/1
5 TABLET ORAL DAILY
Status: DISCONTINUED | OUTPATIENT
Start: 2024-08-08 | End: 2024-08-10 | Stop reason: HOSPADM

## 2024-08-08 RX ADMIN — DONEPEZIL HYDROCHLORIDE 10 MG: 10 TABLET ORAL at 19:52

## 2024-08-08 RX ADMIN — AMLODIPINE BESYLATE 5 MG: 5 TABLET ORAL at 19:00

## 2024-08-08 RX ADMIN — Medication 2000 UNITS: at 19:00

## 2024-08-08 RX ADMIN — ENOXAPARIN SODIUM 40 MG: 100 INJECTION SUBCUTANEOUS at 19:01

## 2024-08-08 RX ADMIN — MEMANTINE HYDROCHLORIDE 10 MG: 10 TABLET ORAL at 19:52

## 2024-08-08 RX ADMIN — ROSUVASTATIN CALCIUM 40 MG: 20 TABLET, COATED ORAL at 19:52

## 2024-08-08 RX ADMIN — LOSARTAN POTASSIUM 50 MG: 25 TABLET, FILM COATED ORAL at 19:01

## 2024-08-08 RX ADMIN — ASPIRIN 81 MG: 81 TABLET, COATED ORAL at 19:01

## 2024-08-08 ASSESSMENT — PAIN SCALES - PAIN ASSESSMENT IN ADVANCED DEMENTIA (PAINAD)
FACIALEXPRESSION: FACIAL GRIMACING
BREATHING: NORMAL
BODYLANGUAGE: RELAXED
CONSOLABILITY: NO NEED TO CONSOLE
TOTALSCORE: 2

## 2024-08-08 ASSESSMENT — ENCOUNTER SYMPTOMS: VOMITING: 1

## 2024-08-08 NOTE — PROGRESS NOTES
4 Eyes Skin Assessment     NAME:  Mala Vann  YOB: 1935  MEDICAL RECORD NUMBER:  [id number]    The patient is being assessed for  Admission    I agree that at least one RN has performed a thorough Head to Toe Skin Assessment on the patient. ALL assessment sites listed below have been assessed.      Areas assessed by both nurses:    Head, Face, Ears, Shoulders, Back, Chest, Arms, Elbows, Hands, Sacrum. Buttock, Coccyx, Ischium, Legs. Feet and Heels, and Under Medical Devices         Does the Patient have a Wound? No noted wound(s)   Dry skin to BLE       Joshua Prevention initiated by RN: Yes  Wound Care Orders initiated by RN: No    Pressure Injury (Stage 3,4, Unstageable, DTI, NWPT, and Complex wounds) if present, place Wound referral order by RN under : No    New Ostomies, if present place, Ostomy referral order under : No     Nurse 1 eSignature: Electronically signed by Isi Joshua RN on 8/8/24 at 6:13 PM EDT    **SHARE this note so that the co-signing nurse can place an eSignature**    Nurse 2 eSignature: Electronically signed by Jenny Petersen RN on 8/8/24 at 7:11 PM EDT

## 2024-08-08 NOTE — PROGRESS NOTES
Pt admitted to room 5515. Alert to only self. Complains of pain to R hip with movement. Skin assessment complete with second RN. Fall precautions in place. All needs met at this time. Plan of care continues.

## 2024-08-08 NOTE — ED PROVIDER NOTES
THE [facility]  EMERGENCY DEPARTMENT ENCOUNTER          PHYSICIAN ASSISTANT NOTE       Date of evaluation: 8/8/2024    Chief Complaint     Fall (Found on ground by neighbors. C/o R hip and side pain. Received 100mcg fentanyl en route )      History of Present Illness     Mala Vann is a 88 y.o. female, with a history of hypertension, hyperlipidemia, depression and dementia, who presents to the ED with complaints of right greater than left hip pain.  The patient lives independently and her sister and son check on her frequently.  She was last communicated with approximately 24 hours prior to arrival. Today her sister went to check on her, did not have her keys and was unable to get in.  She was able to communicate with the patient who was ultimately found on the floor just inside the front door.  It is unclear how long she had been there but her sister notes dried vomit next to her.  She was alert and oriented at the time she was found, is at her baseline mental status per the son's report.  The patient does not provide a reliable history.  She reports pain in the bilateral hips but unable to provide any additional details, has not ambulated since she was found.  Arrives via squad.  Her son states that she was in her usual state of health yesterday without fever, cough or other URI type symptoms, no complaints of pain or evidence of shortness of breath.  No changes in urinary or bowel habits. She has not taken any of her medications today.  The patient was given fentanyl and route with improvement.  She otherwise has no complaints.    ASSESSMENT / PLAN  (MEDICAL DECISION MAKING)     INITIAL VITALS: BP: (!) 146/68, Temp: 99.3 °F (37.4 °C), Pulse: 65, Respirations: 14, SpO2: 99 %    Mala Vann is a 88 y.o. female presenting via squad from home where she lives independently with right greater than left hip pain following an unwitnessed fall, was found today inside her front door with dried vomit next  documented in ED Course.  ECG/medicine tests: ordered. Decision-making details documented in ED Course.  Discussion of management or test interpretation with external provider(s): I discussed the patient with the orthopedist on-call as well as hospitalist.    Risk  Decision regarding hospitalization.        This patient was also evaluated by the attending physician. All care plans were discussed and agreed upon.    Clinical Impression     1. Closed intertrochanteric fracture of hip, right, initial encounter ([facility])    2. Fall in home, initial encounter        Disposition       DISPOSITION Admitted 08/08/2024 05:18:14 PM        Diagnostic Results and Other Data     EKG   Interpreted in conjunction with emergency department physician Grzegorz Baxter MD  Rhythm: junctional  Rate: normal  Axis: left  Ectopy: none  Conduction: normal  ST Segments: no acute change  T Waves: no acute change  Q Waves:none  Clinical Impression: non-specific EKG, limited due to artifact  Comparison:  4/2022    RECENT VITALS:  BP: (!) 152/68, Temp: 99.3 °F (37.4 °C), Pulse: 66, Respirations: 18, SpO2: 96 %     ED Course     Nursing Notes, Past Medical Hx,Past Surgical Hx, Social Hx, Allergies, and Family Hx were reviewed.         The patient was given the following medications:  No orders of the defined types were placed in this encounter.      CONSULTS:  IP CONSULT TO ORTHOPEDIC SURGERY  IP CONSULT TO SOCIAL WORK    Review of Systems     Review of Systems   Unable to perform ROS: Dementia   Constitutional:  Negative for fever.   Gastrointestinal:  Positive for vomiting (x 1).   Musculoskeletal:  Positive for arthralgias (R>L hips) and gait problem (fall).   All other systems reviewed and are negative.      Past Medical, Surgical, Family, and Social History     She has a past medical history of Allergic rhinitis, Depression, DJD (degenerative joint disease), Hyperlipidemia, Hypertension, and Thrombocytopenia ([facility]).  She has a past surgical

## 2024-08-08 NOTE — PLAN OF CARE
Problem: Safety - Adult  Goal: Free from fall injury  Outcome: Progressing  Note: All fall precautions in place and call light is within reach.      Problem: Pain  Goal: Verbalizes/displays adequate comfort level or baseline comfort level  Outcome: Progressing  Note: Pt. Managing pain per MAR.

## 2024-08-08 NOTE — ED PROVIDER NOTES
ED Attending Attestation Note     Date of evaluation: 8/8/2024    This patient was seen by the advance practice provider.  I have seen and examined the patient, agree with the workup, evaluation, management and diagnosis. The care plan has been discussed.  I have reviewed the ECG and concur with the JHONATHAN's interpretation.  My assessment reveals an 88-year-old female with history of Alzheimer's dementia who lives independently with regular family check-in and presents today after she was found down this morning by her family members presumably from a fall of unknown etiology.  She was awake when family found her but was complaining of right hip pain.  Patient is currently at her baseline mental status according to family members.  She does have pain on logroll of the right hip and was found to have a intertrochanteric fracture of the right hip.  She is neurovascularly intact in the right lower extremity with a good DP pulse.  She does not have any tenderness on abdominal exam within the neck or back.  Will plan to admit the patient to the hospitalist for this hip fracture.  Additional workup to look for a syncopal, metabolic or cardiac source of patient's fall was unrevealing.         Grzegorz Baxter MD  08/08/24 [id number]

## 2024-08-08 NOTE — ED NOTES
Pt arrived to ED in clothing that appeared to be soiled in vomit, urine, and feces. Pt changed out of soiled clothes and cleaned up. Pt placed in clean gown and brief.  Pt placed on pure wick. Pt on bed alarm      Alex Jain RN  08/08/24 3774

## 2024-08-08 NOTE — ED NOTES
ED TO INPATIENT SBAR HANDOFF    Patient Name: Mala Vann   :  1935  88 y.o.   MRN:  [id number]  Preferred Name  Mala  ED Room #:  A09/A09-09  Family/Caregiver Present no   Restraints no   Sitter no   Sepsis Risk Score      Situation  Code Status: Prior No additional code details.    Allergies: Nabumetone  Weight: Patient Vitals for the past 96 hrs (Last 3 readings):   Weight   24 1456 55.6 kg (122 lb 8 oz)     Arrived from: home  Chief Complaint:   Chief Complaint   Patient presents with    Fall     Found on ground by neighbors. C/o R hip and side pain. Received 100mcg fentanyl en route      Hospital Problem/Diagnosis:  Active Problems:    * No active hospital problems. *  Resolved Problems:    * No resolved hospital problems. *    Imaging:   XR HIP RIGHT (2-3 VIEWS)   Final Result   1. No findings for acute cardiopulmonary disease.            3 VIEWS OF THE RIGHT SHOULDER      HISTORY: Fall with pain      FINDINGS: There is no discrete fracture or dislocation. Humeral head remains in   the glenoid fossa. Arthritic changes with marginal spurring. High riding humeral   head within the glenoid fossa. Hypertrophic AC joint arthropathy. No soft tissue   abnormality seen.      IMPRESSION:   1. No discrete findings for acute traumatic bony abnormality within the right   shoulder.   2. Hypertrophic osteoarthrosis with high riding humeral head which can be seen   in the setting of acute and/or chronic rotator cuff injury.      IMPRESSION:   1. No discrete findings for acute traumatic bony abnormality.   2. Arthritic changes in the shoulder with high riding humeral head which can be   seen in the setting of acute or chronic rotator cuff injury. Correlate   clinically.            2 VIEWS OF THE RIGHT HIP INCLUDING THE PELVIS      HISTORY: Fall with pain      FINDINGS: There is comminuted intertrochanteric fracture of the right hip.   Femoral head remains within the acetabulum. Mild superior migration of

## 2024-08-08 NOTE — H&P
Hospital Medicine History & Physical      PCP: Sarina Mckeon MD    Date of Admission: 8/8/2024    Date of Service: Pt seen/examined on 8/8/2024 and Admitted to Inpatient with expected LOS greater than two midnights due to medical therapy.     Chief Complaint:    Chief Complaint   Patient presents with    Fall     Found on ground by neighbors. C/o R hip and side pain. Received 100mcg fentanyl en route          History Of Present Illness:    The patient is a 88 y.o. female with history of hypertension, hyperlipidemia, advanced dementia, depression who apparently lives alone with family checking on her daily and presented following an unwitnessed fall at home with right hip injury.  She was found to have shortened externally rotated right lower extremity.  X-ray of the right hip noted for comminuted intertrochanteric fracture of the right hip.  Orthopedics was consulted recommending admission and kept n.p.o. in view of surgical intervention in the morning.  Right shoulder x-ray with no discrete findings of acute bony abnormality  Chest x-ray with no acute pathology  Laboratory workup noted for CPK of 385 with troponins of 27 and 29.  She denies any chest pains or shortness of breath.  She remained pleasantly confused throughout admission interview      Past Medical History:        Diagnosis Date    Allergic rhinitis     Depression     DJD (degenerative joint disease)     Hyperlipidemia     Hypertension     Thrombocytopenia (HCC)        Past Surgical History:        Procedure Laterality Date    BREAST LUMPECTOMY      RT - BENIGN    CATARACT REMOVAL WITH IMPLANT      no implant per pt     HEMORRHOID SURGERY      HYSTERECTOMY (CERVIX STATUS UNKNOWN)         Medications Prior to Admission:    Prior to Admission medications    Medication Sig Start Date End Date Taking? Authorizing Provider   rosuvastatin (CRESTOR) 40 MG tablet TAKE ONE TABLET BY MOUTH ONCE NIGHTLY 7/22/24   Sarina Mckeon MD   amLODIPine  (NORVASC) 5 MG tablet TAKE 1 TABLET BY MOUTH DAILY 7/16/24   Sarina Mckeon MD   escitalopram (LEXAPRO) 20 MG tablet TAKE 1 TABLET BY MOUTH DAILY 7/16/24   Sarina Mckeon MD   telmisartan (MICARDIS) 40 MG tablet TAKE 1 TABLET BY MOUTH DAILY 7/16/24   Sarina Mckeon MD   donepezil (ARICEPT) 10 MG tablet Take 1 tablet by mouth nightly 5/13/24   Sarina Mckeon MD   memantine ER (NAMENDA XR) 21 MG CP24 extended release capsule Take 1 capsule by mouth daily 5/13/24   Sarina Mckeon MD   Cholecalciferol (VITAMIN D) 50 MCG (2000 UT) CAPS capsule Take 1 capsule by mouth daily 9/14/23   Sarina Mckeon MD   Multiple Vitamins-Minerals (MULTIVITAMIN-MINERALS) TABS tablet TAKE ONE TABLET BY MOUTH DAILY 9/11/23   Sarina Mckeon MD   Nutritional Supplements (ENSURE PLUS HN) LIQD Take 1 Can by mouth 3 times daily 5/25/22 6/24/22  Sarina Mckeon MD   aspirin 81 MG tablet Take 1 tablet by mouth daily    Provider, MD Keith       Allergies:  Nabumetone    Social History:  The patient currently lives alone with family checking on her.    TOBACCO:   reports that she has never smoked. She has never used smokeless tobacco.  ETOH:   reports no history of alcohol use.      Family History:  Reviewed in detail and negative for DM, Early CAD, Cancer, CVA. Positive as follows:        Problem Relation Age of Onset    High Blood Pressure Mother     High Blood Pressure Sister     Heart Disease Brother     High Blood Pressure Brother     Stroke Paternal Uncle     Cancer Maternal Grandmother         ? FEMALE ORGAN    High Blood Pressure Other         SON    Diabetes Paternal Aunt        REVIEW OF SYSTEMS:   Be obtained due to dementia    PHYSICAL EXAM:  BP (!) 152/68   Pulse 66   Temp 99.3 °F (37.4 °C) (Oral)   Resp 18   Ht 1.676 m (5' 6\")   Wt 55.6 kg (122 lb 8 oz)   SpO2 96%   BMI 19.77 kg/m²     General appearance:  Alert and oriented, No apparent distress and cooperative.  HEENT Normal cephalic, atraumatic  mild troponin elevation.    Plan:  - Pain management  - IV fluid for hydration  - Monitor renal function and electrolytes with mild rhabdomyolysis  - Orthopedic consult in view of surgical repair  - At risk of delirium given advanced dementia  - Repeat troponins in the morning.  Currently remaining flat.  No chest pain.  Recent echocardiogram done 6/13/2024 with preserved EF and no wall motion abnormality, grade 1 diastolic dysfunction.  I will spend echocardiogram this time  - Continue chronical medications  - PT OT evaluation in view of discharge planning with social work consult      DVT Prophylaxis: Subcut enoxaparin   Diet: General Diet n.p.o. after midnight  Code Status: Surgery       Inocente Mccarthy MD    Thank you Sarina Mckeon MD for the opportunity to be involved in this patient's care. If you have any questions or concerns please feel free to contact me at [phone].

## 2024-08-08 NOTE — ED NOTES
Pt's son, Charles Vann ([phone]), updated on pt's admission status and bed assignment     Alex Jain RN  08/08/24 0266

## 2024-08-09 ENCOUNTER — TELEPHONE (OUTPATIENT)
Dept: INTERNAL MEDICINE CLINIC | Age: 89
End: 2024-08-09

## 2024-08-09 VITALS
HEIGHT: 66 IN | DIASTOLIC BLOOD PRESSURE: 70 MMHG | TEMPERATURE: 97.5 F | RESPIRATION RATE: 16 BRPM | OXYGEN SATURATION: 95 % | WEIGHT: 122.5 LBS | SYSTOLIC BLOOD PRESSURE: 151 MMHG | HEART RATE: 61 BPM | BODY MASS INDEX: 19.69 KG/M2

## 2024-08-09 LAB
ABO + RH BLD: NORMAL
ANION GAP SERPL CALCULATED.3IONS-SCNC: 10 MMOL/L (ref 3–16)
BASOPHILS # BLD: 0 K/UL (ref 0–0.2)
BASOPHILS NFR BLD: 0.2 %
BLD GP AB SCN SERPL QL: NORMAL
BUN SERPL-MCNC: 14 MG/DL (ref 7–20)
CALCIUM SERPL-MCNC: 9 MG/DL (ref 8.3–10.6)
CHLORIDE SERPL-SCNC: 107 MMOL/L (ref 99–110)
CO2 SERPL-SCNC: 24 MMOL/L (ref 21–32)
CREAT SERPL-MCNC: 0.9 MG/DL (ref 0.6–1.2)
DEPRECATED RDW RBC AUTO: 14.7 % (ref 12.4–15.4)
EOSINOPHIL # BLD: 0 K/UL (ref 0–0.6)
EOSINOPHIL NFR BLD: 0.2 %
GFR SERPLBLD CREATININE-BSD FMLA CKD-EPI: 62 ML/MIN/{1.73_M2}
GLUCOSE SERPL-MCNC: 97 MG/DL (ref 70–99)
HCT VFR BLD AUTO: 33.5 % (ref 36–48)
HGB BLD-MCNC: 10.7 G/DL (ref 12–16)
LYMPHOCYTES # BLD: 0.8 K/UL (ref 1–5.1)
LYMPHOCYTES NFR BLD: 11.1 %
MCH RBC QN AUTO: 26.9 PG (ref 26–34)
MCHC RBC AUTO-ENTMCNC: 31.8 G/DL (ref 31–36)
MCV RBC AUTO: 84.4 FL (ref 80–100)
MONOCYTES # BLD: 0.7 K/UL (ref 0–1.3)
MONOCYTES NFR BLD: 9.3 %
NEUTROPHILS # BLD: 6.1 K/UL (ref 1.7–7.7)
NEUTROPHILS NFR BLD: 79.2 %
PLATELET # BLD AUTO: 88 K/UL (ref 135–450)
PMV BLD AUTO: 10.8 FL (ref 5–10.5)
POTASSIUM SERPL-SCNC: 3.9 MMOL/L (ref 3.5–5.1)
RBC # BLD AUTO: 3.97 M/UL (ref 4–5.2)
SODIUM SERPL-SCNC: 141 MMOL/L (ref 136–145)
WBC # BLD AUTO: 7.7 K/UL (ref 4–11)

## 2024-08-09 PROCEDURE — 86901 BLOOD TYPING SEROLOGIC RH(D): CPT

## 2024-08-09 PROCEDURE — 85025 COMPLETE CBC W/AUTO DIFF WBC: CPT

## 2024-08-09 PROCEDURE — 6360000002 HC RX W HCPCS: Performed by: INTERNAL MEDICINE

## 2024-08-09 PROCEDURE — 6370000000 HC RX 637 (ALT 250 FOR IP): Performed by: INTERNAL MEDICINE

## 2024-08-09 PROCEDURE — 86900 BLOOD TYPING SEROLOGIC ABO: CPT

## 2024-08-09 PROCEDURE — 36415 COLL VENOUS BLD VENIPUNCTURE: CPT

## 2024-08-09 PROCEDURE — 51798 US URINE CAPACITY MEASURE: CPT

## 2024-08-09 PROCEDURE — 51701 INSERT BLADDER CATHETER: CPT

## 2024-08-09 PROCEDURE — 80048 BASIC METABOLIC PNL TOTAL CA: CPT

## 2024-08-09 PROCEDURE — 86850 RBC ANTIBODY SCREEN: CPT

## 2024-08-09 RX ADMIN — ESCITALOPRAM OXALATE 20 MG: 20 TABLET, FILM COATED ORAL at 09:37

## 2024-08-09 RX ADMIN — MORPHINE SULFATE 2 MG: 2 INJECTION, SOLUTION INTRAMUSCULAR; INTRAVENOUS at 13:51

## 2024-08-09 RX ADMIN — MEMANTINE HYDROCHLORIDE 10 MG: 10 TABLET ORAL at 09:37

## 2024-08-09 RX ADMIN — ASPIRIN 81 MG: 81 TABLET, COATED ORAL at 09:37

## 2024-08-09 RX ADMIN — MORPHINE SULFATE 2 MG: 2 INJECTION, SOLUTION INTRAMUSCULAR; INTRAVENOUS at 06:16

## 2024-08-09 RX ADMIN — AMLODIPINE BESYLATE 5 MG: 5 TABLET ORAL at 09:37

## 2024-08-09 RX ADMIN — DONEPEZIL HYDROCHLORIDE 10 MG: 10 TABLET ORAL at 19:50

## 2024-08-09 RX ADMIN — Medication 1 TABLET: at 09:37

## 2024-08-09 RX ADMIN — ROSUVASTATIN CALCIUM 40 MG: 20 TABLET, COATED ORAL at 19:49

## 2024-08-09 RX ADMIN — MORPHINE SULFATE 2 MG: 2 INJECTION, SOLUTION INTRAMUSCULAR; INTRAVENOUS at 21:45

## 2024-08-09 RX ADMIN — OXYCODONE HYDROCHLORIDE AND ACETAMINOPHEN 1 TABLET: 5; 325 TABLET ORAL at 19:55

## 2024-08-09 RX ADMIN — MEMANTINE HYDROCHLORIDE 10 MG: 10 TABLET ORAL at 19:50

## 2024-08-09 RX ADMIN — ENOXAPARIN SODIUM 40 MG: 100 INJECTION SUBCUTANEOUS at 18:08

## 2024-08-09 RX ADMIN — LOSARTAN POTASSIUM 50 MG: 25 TABLET, FILM COATED ORAL at 09:37

## 2024-08-09 RX ADMIN — Medication 2000 UNITS: at 09:37

## 2024-08-09 ASSESSMENT — PAIN DESCRIPTION - FREQUENCY
FREQUENCY: CONTINUOUS

## 2024-08-09 ASSESSMENT — PAIN SCALES - PAIN ASSESSMENT IN ADVANCED DEMENTIA (PAINAD)
BREATHING: NORMAL
BODYLANGUAGE: RELAXED
TOTALSCORE: 0
FACIALEXPRESSION: FACIAL GRIMACING
FACIALEXPRESSION: SMILING OR INEXPRESSIVE
NEGVOCALIZATION: REPEATED TROUBLED CALLING OUT, LOUD MOANING/GROANING, CRYING
TOTALSCORE: 7
BODYLANGUAGE: RIGID, FISTS CLENCHED, KNEES UP, PUSHING/PULLING AWAY, STRIKES OUT
BODYLANGUAGE: RELAXED
CONSOLABILITY: NO NEED TO CONSOLE
CONSOLABILITY: DISTRACTED OR REASSURED BY VOICE/TOUCH
FACIALEXPRESSION: SMILING OR INEXPRESSIVE
BREATHING: NORMAL
TOTALSCORE: 0
BREATHING: NORMAL
CONSOLABILITY: NO NEED TO CONSOLE

## 2024-08-09 ASSESSMENT — PAIN DESCRIPTION - ONSET
ONSET: ON-GOING

## 2024-08-09 ASSESSMENT — PAIN - FUNCTIONAL ASSESSMENT
PAIN_FUNCTIONAL_ASSESSMENT: PREVENTS OR INTERFERES WITH MANY ACTIVE NOT PASSIVE ACTIVITIES
PAIN_FUNCTIONAL_ASSESSMENT: PREVENTS OR INTERFERES SOME ACTIVE ACTIVITIES AND ADLS
PAIN_FUNCTIONAL_ASSESSMENT: PREVENTS OR INTERFERES WITH MANY ACTIVE NOT PASSIVE ACTIVITIES

## 2024-08-09 ASSESSMENT — PAIN DESCRIPTION - DESCRIPTORS
DESCRIPTORS: ACHING

## 2024-08-09 ASSESSMENT — PAIN SCALES - GENERAL
PAINLEVEL_OUTOF10: 6
PAINLEVEL_OUTOF10: 6
PAINLEVEL_OUTOF10: 2
PAINLEVEL_OUTOF10: 6
PAINLEVEL_OUTOF10: 2

## 2024-08-09 ASSESSMENT — PAIN DESCRIPTION - LOCATION
LOCATION: HIP

## 2024-08-09 ASSESSMENT — PAIN DESCRIPTION - ORIENTATION
ORIENTATION: MID
ORIENTATION: RIGHT
ORIENTATION: RIGHT

## 2024-08-09 ASSESSMENT — PAIN DESCRIPTION - PAIN TYPE
TYPE: ACUTE PAIN

## 2024-08-09 NOTE — PROGRESS NOTES
Pt. Alert and oriented only to self and VSS. Patient on bedrest. Patient turned and repositioned. Patient bladder scanned 450mL and straight cathed 400mL this shift. Patient pulled out IV and new one was replaced this shift. Awaiting possible transfer to [facility]. Surgery delayed today. Plan of care ongoing.

## 2024-08-09 NOTE — PLAN OF CARE
Problem: Discharge Planning  Goal: Discharge to home or other facility with appropriate resources  Outcome: Progressing     Problem: Safety - Adult  Goal: Free from fall injury  Outcome: Progressing  Bed in lowest position, alarm on and call light within reach. Camera on.     Problem: Pain  Goal: Verbalizes/displays adequate comfort level or baseline comfort level  Outcome: Progressing   Patient pain controlled with PRN medications per MAR. Rest and reposition promoted.     Problem: Confusion  Goal: Confusion, delirium, dementia, or psychosis is improved or at baseline  Description: INTERVENTIONS:  1. Assess for possible contributors to thought disturbance, including medications, impaired vision or hearing, underlying metabolic abnormalities, dehydration, psychiatric diagnoses, and notify attending LIP  2. [place] high risk fall precautions, as indicated  3. Provide frequent short contacts to provide reality reorientation, refocusing and direction  4. Decrease environmental stimuli, including noise as appropriate  5. Monitor and intervene to maintain adequate nutrition, hydration, elimination, sleep and activity  6. If unable to ensure safety without constant attention obtain sitter and review sitter guidelines with assigned personnel  7. Initiate Psychosocial CNS and Spiritual Care consult, as indicated  Outcome: Progressing   Delirium precautions in place. Bed alarm and camera on. Placed in pts. Room.

## 2024-08-09 NOTE — PROGRESS NOTES
V2.0    [facility] [place]      Name:  Mala Vann /Age/Sex: 1935  (88 y.o. female)   MRN & CSN:  [id number] & [id number] Encounter Date/Time: 2024 9:51 AM EDT   Location:  5515/5515-01 PCP: Sarina Mckeon MD     Attending:Francis Avila MD       Hospital Day: 2    Assessment and Recommendations   Mala Vann is a 88 y.o. female with pmh of hypertension, hyperlipidemia, dementia, depression who presented to the emergency room after an unwitnessed fall with complaints of right hip injury.  X-rays demonstrated comminuted intertrochanteric fracture of the hip.      Plan:   Right intertrochanteric fracture-orthopedics consulted.  Plan for operating room today  Hypertension-continue Norvasc and losartan  Dementia-continue Namenda.  Patient high risk for delirium  Hyperlipidemia-continue Crestor      Diet Diet NPO   DVT Prophylaxis [] Lovenox, []  Heparin, [] SCDs, [] Ambulation,  [] Eliquis, [] Xarelto  [] Coumadin   Code Status Full Code   Disposition From: Home  Expected Disposition: Home versus SNF  Estimated Date of Discharge: 1 to 2 days  Patient requires continued admission due to hip fracture   Surrogate Decision Maker/ POA  Per EMR     Personally reviewed Lab Studies and Imaging   2024  CBC, BMP, CK reviewed    Imaging that was interpreted personally includes x-rays of the right hip and results demonstrated a comminuted intertrochanteric fracture.          Subjective:     Chief Complaint: Right hip    Mala Vann is a 88 y.o. female who presents with a past medical history as detailed above.     On 2024 patient resting comfortably.  Complains of right hip pain with question.  Patient oriented only to self.      Review of Systems:      Pertinent positives and negatives discussed in HPI    Objective:     Intake/Output Summary (Last 24 hours) at 2024 0951  Last data filed at 2024 0600  Gross per 24 hour   Intake 100 ml   Output --   Net 100 ml      Vitals:   Vitals:  Arthritic changes with marginal spurring. High riding humeral head within the glenoid fossa. Hypertrophic AC joint arthropathy. No soft tissue abnormality seen. IMPRESSION: 1. No discrete findings for acute traumatic bony abnormality within the right shoulder. 2. 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 IMPRESSION: 1. No discrete findings for acute traumatic bony abnormality. 2. Arthritic changes in the shoulder with high riding humeral head which can be seen in the setting of acute or chronic rotator cuff injury. Correlate clinically. 2 VIEWS OF THE RIGHT HIP INCLUDING THE PELVIS HISTORY: Fall with pain FINDINGS: There is comminuted intertrochanteric fracture of the right hip. Femoral head remains within the acetabulum. Mild superior migration of the femoral shaft. Remaining visualized bony pelvis is intact. IMPRESSION: 1. Comminuted intertrochanteric type fracture of the right hip. Electronically signed by Bao Maria    XR CHEST 1 VIEW    Result Date: 8/8/2024  PORTABLE CHEST. HISTORY: Fall, pain COMPARISON STUDY: 4/26/2022 heart size appears at the upper limits of normal. There is elevation the right hemidiaphragm. Calcified granuloma peripheral right lower lung unchanged. No localized consolidation or pleural effusion. Bony structures are intact. Arthritic changes in both shoulders.     1. No findings for acute cardiopulmonary disease. 3 VIEWS OF THE RIGHT SHOULDER HISTORY: Fall with pain FINDINGS: There is no discrete fracture or dislocation. Humeral head remains in the glenoid fossa. Arthritic changes with marginal spurring. High riding humeral head within the glenoid fossa. Hypertrophic AC joint arthropathy. No soft tissue abnormality seen. IMPRESSION: 1. No discrete findings for acute traumatic bony abnormality within the right shoulder. 2. 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

## 2024-08-09 NOTE — CARE COORDINATION
Case Management Assessment  Initial Evaluation    Date/Time of Evaluation: 8/9/2024 12:36 PM  Assessment Completed by: Isamar Wilcox RN    If patient is discharged prior to next notation, then this note serves as note for discharge by case management.    Patient Name: Mala Vann                   YOB: 1935  Diagnosis: Closed right hip fracture, initial encounter ([facility]) [S72.001A]  Fall in home, initial encounter [W19.XXXA, Y92.009]  Closed intertrochanteric fracture of hip, right, initial encounter ([facility]) [S72.141A]                   Date / Time: 8/8/2024  2:38 PM    Patient Admission Status: Inpatient   Readmission Risk (Low < 19, Mod (19-27), High > 27): Readmission Risk Score: 12.7    Current PCP: Sarina Mckeon MD  PCP verified by CM? No    Chart Reviewed: Yes      History Provided by: Child/Family  Patient Orientation: Self    Patient Cognition: Dementia / Early Alzheimer's    Hospitalization in the last 30 days (Readmission):  No    If yes, Readmission Assessment in  Navigator will be completed.    Advance Directives:      Code Status: Full Code   Patient's Primary Decision Maker is: Named in Scanned ACP Document    Primary Decision Maker: Charles Vann - Child - [phone]    Discharge Planning:    Patient lives with: Alone Type of Home: Apartment (4 family)  Primary Care Giver: Family  Patient Support Systems include: Children   Current Financial resources: Medicare  Current community resources: None  Current services prior to admission: Durable Medical Equipment            Current DME: Cane            Type of Home Care services:  None    ADLS  Prior functional level: Independent in ADLs/IADLs  Current functional level: Independent in ADLs/IADLs    PT AM-PAC:   /24  OT AM-PAC:   /24    Family can provide assistance at DC: Yes  Would you like Case Management to discuss the discharge plan with any other family members/significant others, and if so, who? Yes ( (son))  Plans  to Return to Present Housing: Unknown at present  Other Identified Issues/Barriers to RETURNING to current housing: may need SNF  Potential Assistance needed at discharge: Skilled Nursing Facility            Potential DME:    Patient expects to discharge to: Skilled nursing facility  Plan for transportation at discharge: Family    Financial    Payor: MEDICARE / Plan: MEDICARE PART A AND B / Product Type: *No Product type* /     Does insurance require precert for SNF: No    Potential assistance Purchasing Medications:    Meds-to-Beds request: Not Assessed      [facility] [id number] - [facility] [address] [facility] - P [phone] - F [phone]  [address] [facility] [address]  Phone: [phone] Fax: [phone]    [facility] [id number] - [facility] [address] [place] [address] -  [phone] - F [phone] [address] [facility] [address]  Phone: [phone] Fax: [phone]      Notes:    Factors facilitating achievement of predicted outcomes: Family support    Barriers to discharge: Pain    Additional Case Management Notes: Patient is from home alone with daily family care.  Patient able to ambulate independently with cane normally.   is pt's only son and HCPOA, at bedside for assessment.  Patient may need SNF if surgical intervention indicated.  CM will continue to follow.    The Plan for Transition of Care is related to the following treatment goals of Closed right hip fracture, initial encounter (HCC) [S72.001A]  Fall in home, initial encounter [W19.XXXA, Y92.009]  Closed intertrochanteric fracture of hip, right, initial encounter (HCC) [S72.141A]    IF APPLICABLE: The Patient and/or patient representative Mala and her family were provided with a choice of provider and agrees with the discharge plan. Freedom of choice list with basic dialogue that supports the patient's individualized plan of care/goals and shares the quality data associated with the providers was provided  to: Patient   Patient Representative Name:       The Patient and/or Patient Representative Agree with the Discharge Plan? Yes    Isamar Wilcox RN  Case Management Department  Ph: [phone] Fax: [phone]

## 2024-08-09 NOTE — PLAN OF CARE
Spoke with nurse for follow up.  Family seems to desire transfer to [facility].  Will cancel case today and await results of that effort.   TW

## 2024-08-09 NOTE — PLAN OF CARE
Problem: Safety - Adult  Goal: Free from fall injury  8/9/2024 1917 by Emma Sky, RN  Outcome: Progressing  Note: All fall precautions in place and call light is within reach.      Problem: Pain  Goal: Verbalizes/displays adequate comfort level or baseline comfort level  8/9/2024 1917 by Emma Sky, RN  Outcome: Progressing  Note: Pt. Managing pain per MAR.

## 2024-08-09 NOTE — PLAN OF CARE
I received a message at 1550 that the patient's family wished for her to be transferred to [facility]. This was the first time I had received that information.    I called the transfer center at 1605 to initiate a transfer. Awaiting return call    Electronically signed by NEPTALI Vinson CNP on 8/9/2024 at 4:45 PM

## 2024-08-09 NOTE — PROGRESS NOTES
Ortho    Consult received  Plan OR this afternoon if medically stable and OR available  Please remain NPO, consent, timing TBD    TW

## 2024-08-09 NOTE — TELEPHONE ENCOUNTER
Fell and broke her hip yesterday at [facility] and son wants to transfer her to [facility] and is having an issue with this and wanted provider to know.fyi

## 2024-08-09 NOTE — PROGRESS NOTES
Pt. Oriented to self only. VSS on RA. Pt. Denies pain. Pt. On bedrest. Pt. Made NPO @midnight. All fall precautions in place and call light is within reach.    RASH

## 2024-08-09 NOTE — PROGRESS NOTES
Physical Therapy/Occupational Therapy  Discharge  Will sign off from PT/OT- ortho consulted for R hip fx & will require surgery.  Please re-order after surgery when appropriate. Discussed with RN.  Tegan Mercer, PT [id number]  Qian Emerson OTR/L #[id number]           PRINCIPAL DISCHARGE DIAGNOSIS  Diagnosis: Morbid obesity  Assessment and Plan of Treatment:

## 2024-08-09 NOTE — PLAN OF CARE
Spoke with son .  Daughter is RN who worked with Dr Espinoza and they would like to contact him to perform the hip surgery.  They are trying to contact him.  As such, consent and surgery are on hold.  TW

## 2024-08-10 NOTE — DISCHARGE SUMMARY
V2.0  Discharge Summary    Name:  Mala Vann /Age/Sex: 1935 (88 y.o. female)   Admit Date: 2024  Discharge Date: 8/10/24    MRN & CSN:  [id number] & [id number] Encounter Date and Time 8/10/24 12:31 PM EDT    Attending:  No att. providers found Discharging Provider: NEPTALI Vinson [facility] Course:     Brief HPI:Mala Vann is a 88 y.o. female with pmh of hypertension, hyperlipidemia, dementia, depression who presented to the emergency room after an unwitnessed fall with complaints of right hip injury.  X-rays demonstrated comminuted intertrochanteric fracture of the hip.     Patient's family requested transfer to [facility] which was facilitated.     Brief Problem Based Course:   Right intertrochanteric fracture-orthopedics consulted.  Family requested tranfer for surgery.  Hypertension-continue Norvasc and losartan  Dementia-continue Namenda.  Patient high risk for delirium  Hyperlipidemia-continue Crestor        The patient expressed appropriate understanding of, and agreement with the discharge recommendations, medications, and plan.     Consults this admission:  IP CONSULT TO ORTHOPEDIC SURGERY  IP CONSULT TO SOCIAL WORK    Discharge Diagnosis:   Closed right hip fracture, initial encounter ([facility])        Discharge Instruction:   Follow up appointments:   Primary care physician: aSrina Mckeon MD within 2 weeks  Diet:    Activity:   Disposition: Discharged to: orth  Condition on discharge: Stable  Labs and Tests to be Followed up as an outpatient by PCP or Specialist:     Discharge Medications:        Medication List        CONTINUE taking these medications      amLODIPine 5 MG tablet  Commonly known as: NORVASC  TAKE 1 TABLET BY MOUTH DAILY     aspirin 81 MG tablet     donepezil 10 MG tablet  Commonly known as: ARICEPT  Take 1 tablet by mouth nightly     Ensure Plus HN Liqd  Take 1 Can by mouth 3 times daily     escitalopram 20 MG tablet  Commonly known as:  2. Arthritic changes in the shoulder with high riding humeral head which can be seen in the setting of acute or chronic rotator cuff injury. Correlate clinically. 2 VIEWS OF THE RIGHT HIP INCLUDING THE PELVIS HISTORY: Fall with pain FINDINGS: There is comminuted intertrochanteric fracture of the right hip. Femoral head remains within the acetabulum. Mild superior migration of the femoral shaft. Remaining visualized bony pelvis is intact. IMPRESSION: 1. Comminuted intertrochanteric type fracture of the right hip. Electronically signed by Bao Maria    XR CHEST 1 VIEW    Result Date: 8/8/2024  PORTABLE CHEST. HISTORY: Fall, pain COMPARISON STUDY: 4/26/2022 heart size appears at the upper limits of normal. There is elevation the right hemidiaphragm. Calcified granuloma peripheral right lower lung unchanged. No localized consolidation or pleural effusion. Bony structures are intact. Arthritic changes in both shoulders.     1. No findings for acute cardiopulmonary disease. 3 VIEWS OF THE RIGHT SHOULDER HISTORY: Fall with pain FINDINGS: There is no discrete fracture or dislocation. Humeral head remains in the glenoid fossa. Arthritic changes with marginal spurring. High riding humeral head within the glenoid fossa. Hypertrophic AC joint arthropathy. No soft tissue abnormality seen. IMPRESSION: 1. No discrete findings for acute traumatic bony abnormality within the right shoulder. 2. 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 IMPRESSION: 1. No discrete findings for acute traumatic bony abnormality. 2. Arthritic changes in the shoulder with high riding humeral head which can be seen in the setting of acute or chronic rotator cuff injury. Correlate clinically. 2 VIEWS OF THE RIGHT HIP INCLUDING THE PELVIS HISTORY: Fall with pain FINDINGS: There is comminuted intertrochanteric fracture of the right hip. Femoral head remains  within the acetabulum. Mild superior migration of the femoral shaft. Remaining visualized bony pelvis is intact. IMPRESSION: 1. Comminuted intertrochanteric type fracture of the right hip. Electronically signed by Bao Maria      CBC:   Recent Labs     08/08/24  1545 08/09/24  0605   WBC 8.5 7.7   HGB 10.8* 10.7*   * 88*     BMP:    Recent Labs     08/08/24  1545 08/09/24  0605    141   K 4.0 3.9    107   CO2 21 24   BUN 14 14   CREATININE 0.9 0.9   GLUCOSE 118* 97     Hepatic: No results for input(s): \"AST\", \"ALT\", \"BILITOT\", \"ALKPHOS\" in the last 72 hours.    Invalid input(s): \"ALB\"  Lipids:   Lab Results   Component Value Date/Time    CHOL 158 03/16/2023 03:19 PM    HDL 65 03/16/2023 03:19 PM    HDL 49 03/20/2012 02:53 PM    TRIG 59 03/16/2023 03:19 PM     Hemoglobin A1C:   Lab Results   Component Value Date/Time    LABA1C 5.4 04/04/2019 12:21 PM     TSH:   Lab Results   Component Value Date/Time    TSH 1.27 03/16/2023 03:19 PM     Troponin: No results found for: \"TROPONINT\"  Lactic Acid: No results for input(s): \"LACTA\" in the last 72 hours.  BNP: No results for input(s): \"PROBNP\" in the last 72 hours.  UA:  Lab Results   Component Value Date/Time    NITRU Negative 03/16/2023 03:19 PM    COLORU yellow 09/14/2023 05:21 PM    COLORU Yellow 03/16/2023 03:19 PM    PHUR 7.0 09/14/2023 05:21 PM    PHUR 6.0 03/16/2023 03:19 PM    LABCAST 0-1 Hyaline 06/28/2019 11:18 AM    WBCUA 3 03/16/2023 03:19 PM    RBCUA 1 03/16/2023 03:19 PM    MUCUS 2+ 06/28/2019 11:18 AM    BACTERIA None Seen 03/16/2023 03:19 PM    CLARITYU clear 09/14/2023 05:21 PM    CLARITYU Clear 03/16/2023 03:19 PM    SPECGRAV 1.020 09/14/2023 05:21 PM    LEUKOCYTESUR small 09/14/2023 05:21 PM    LEUKOCYTESUR MODERATE 03/16/2023 03:19 PM    UROBILINOGEN 0.2 03/16/2023 03:19 PM    BILIRUBINUR negative 09/14/2023 05:21 PM    BLOODU negative 09/14/2023 05:21 PM    BLOODU Negative 03/16/2023 03:19 PM    GLUCOSEU negative 09/14/2023 05:21

## 2024-08-10 NOTE — PLAN OF CARE
Problem: Discharge Planning  Goal: Discharge to home or other facility with appropriate resources  8/9/2024 2117 by Emma Sky RN  Outcome: Adequate for Discharge  8/9/2024 1033 by Jenny Petersen RN  Outcome: Progressing     Problem: Skin/Tissue Integrity  Goal: Absence of new skin breakdown  Description: 1.  Monitor for areas of redness and/or skin breakdown  2.  Assess vascular access sites hourly  3.  Every 4-6 hours minimum:  Change oxygen saturation probe site  4.  Every 4-6 hours:  If on nasal continuous positive airway pressure, respiratory therapy assess nares and determine need for appliance change or resting period.  Outcome: Adequate for Discharge     Problem: ABCDS Injury Assessment  Goal: Absence of physical injury  Outcome: Adequate for Discharge     Problem: Safety - Adult  Goal: Free from fall injury  8/9/2024 2117 by Emma Sky RN  Outcome: Adequate for Discharge  8/9/2024 1917 by Emma Sky RN  Outcome: Progressing  Note: All fall precautions in place and call light is within reach.   8/9/2024 1033 by Jenny Petersen RN  Outcome: Progressing     Problem: Pain  Goal: Verbalizes/displays adequate comfort level or baseline comfort level  8/9/2024 2117 by Emma Sky RN  Outcome: Adequate for Discharge  8/9/2024 1917 by Emma Sky RN  Outcome: Progressing  Note: Pt. Managing pain per MAR.  8/9/2024 1033 by Jenny Petersen RN  Outcome: Progressing     Problem: Confusion  Goal: Confusion, delirium, dementia, or psychosis is improved or at baseline  Description: INTERVENTIONS:  1. Assess for possible contributors to thought disturbance, including medications, impaired vision or hearing, underlying metabolic abnormalities, dehydration, psychiatric diagnoses, and notify attending LIP  2. Gregory high risk fall precautions, as indicated  3. Provide frequent short contacts to provide reality reorientation, refocusing and direction  4. Decrease environmental stimuli,

## 2024-08-12 ENCOUNTER — TELEPHONE (OUTPATIENT)
Dept: INTERNAL MEDICINE CLINIC | Age: 89
End: 2024-08-12

## 2024-08-12 NOTE — TELEPHONE ENCOUNTER
CALLED AND DISCUSSED WITH PT'S SON    S/P SURGERY.PLAN FOR D/C TO REHAB    STATES PT WILL LIKELY STAY ON IN ECF    ENCOURAGED PT / SON  FOLLOW UP AS NEEDED  PT'S SON VERBALIZED UNDERSTANDING

## 2024-08-13 DIAGNOSIS — G30.1 LATE ONSET ALZHEIMER'S DISEASE WITHOUT BEHAVIORAL DISTURBANCE (HCC): ICD-10-CM

## 2024-08-13 DIAGNOSIS — F02.80 LATE ONSET ALZHEIMER'S DISEASE WITHOUT BEHAVIORAL DISTURBANCE (HCC): ICD-10-CM

## 2024-08-13 DIAGNOSIS — F33.1 MODERATE EPISODE OF RECURRENT MAJOR DEPRESSIVE DISORDER (HCC): ICD-10-CM

## 2024-08-13 DIAGNOSIS — I10 PRIMARY HYPERTENSION: ICD-10-CM

## 2024-08-13 NOTE — PROGRESS NOTES
Physician Progress Note      PATIENT:               YAQUELIN SCHMIDT  [facility] #:                  [id number]  :                       1935  ADMIT DATE:       2024 2:38 PM  DISCH DATE:        2024 10:11 PM  RESPONDING  PROVIDER #:        MARITA FRAZIER          QUERY TEXT:    Pt admitted with closed intertrochanteric fracture of R femur.  Pt noted to   have mild rhabdomyolysis documented.  If possible, please document in progress   notes and discharge summary if you are evaluating and/or treating any of the   following:    The medical record reflects the following:  Risk Factors: 88 y.o. female, with a history of hypertension, hyperlipidemia,   depression and dementia    Clinical Indicators: Per  Lab Results Total CK is 385.  Troponin is 27-->29   ED Provider Note \"patient lives independently and her sister and son check   on her frequently.  She was last communicated with approximately 24 hours   prior to arrival. Today her sister went to check on her, did not have her keys   and was unable to get in.  She was able to communicate with the patient who   was ultimately found on the floor just inside the front door.  It is unclear   how long she had been there but her sister notes dried vomit next to her.\"   ED Nurse Note \"Pt arrived to ED in clothing that appeared to be soiled in   vomit, urine, and feces.\"    Treatment: CBC, Total CK, Troponins x 2, supportive care, IV fluid for   hydration NS 75cc/hour ordered, Ortho consult, monitor renal function, pain   management    Per [email or web address]  Traumatic rhabdomyolysis cause examples: crush syndrome, prolonged   immobilization  Nontraumatic rhabdomyolysis cause examples:  marked exertion, hyperthermia,   metabolic myopathy, drugs or toxins, infections, electrolyte disorders.      Thank you,    Abby JACKSONN RN CRCR CCS  CDI Specialist  [email or web address]  Options provided:  -- Nontraumatic

## 2024-08-14 RX ORDER — MEMANTINE HYDROCHLORIDE 21 MG/1
21 CAPSULE, EXTENDED RELEASE ORAL DAILY
Qty: 30 CAPSULE | Refills: 0 | Status: SHIPPED | OUTPATIENT
Start: 2024-08-14

## 2024-08-14 RX ORDER — ESCITALOPRAM OXALATE 20 MG/1
20 TABLET ORAL DAILY
Qty: 30 TABLET | Refills: 0 | Status: SHIPPED | OUTPATIENT
Start: 2024-08-14

## 2024-08-14 RX ORDER — AMLODIPINE BESYLATE 5 MG/1
5 TABLET ORAL DAILY
Qty: 30 TABLET | Refills: 0 | Status: SHIPPED | OUTPATIENT
Start: 2024-08-14

## 2024-09-07 PROBLEM — R79.89 ELEVATED TROPONIN: Status: RESOLVED | Noted: 2024-08-08 | Resolved: 2024-09-07

## 2024-11-02 NOTE — TELEPHONE ENCOUNTER
Reason for Disposition   Recent traumatic event (e.g., death of a loved one, job loss, victim/witness of crime)    Answer Assessment - Initial Assessment Questions  1. CONCERN: \"What happened that made you call today? \"      Concerns about insurance and paying bills for insurance    2. ANXIETY SYMPTOM SCREENING: \"Can you describe how you have been feeling? \"  (e.g., tense, restless, panicky, anxious, keyed up, trouble sleeping, trouble concentrating)      Trouble sleeping, restless, feeling worried. Patient reported some memory loss that is concerning. 3. ONSET: \"How long have you been feeling this way? \"      Feeling, \"very anxious\" last night due to figuring out what insurance to choose. Also worried about medication refills    4. RECURRENT: \"Have you felt this way before? \"  If yes: \"What happened that time? \" \"What helped these feelings go away in the past?\"       Per patient, \"sometimes\"    5. RISK OF HARM - SUICIDAL IDEATION:  \"Do you ever have thoughts of hurting or killing yourself? \"  (e.g., yes, no, no but preoccupation with thoughts about death)    - INTENT:  \"Do you have thoughts of hurting or killing yourself right NOW? \" (e.g., yes, no, N/A)    - PLAN: \"Do you have a specific plan for how you would do this? \" (e.g., gun, knife, overdose, no plan, N/A)      Denies    6. RISK OF HARM - HOMICIDAL IDEATION:  \"Do you ever have thoughts of hurting or killing someone else? \"  (e.g., yes, no, no but preoccupation with thoughts about death)    - INTENT:  \"Do you have thoughts of hurting or killing someone right NOW? \" (e.g., yes, no, N/A)    - PLAN: \"Do you have a specific plan for how you would do this? \" (e.g., gun, knife, no plan, N/A)       Denies    7. FUNCTIONAL IMPAIRMENT: \"How have things been going for you overall in your life? Have you had any more difficulties than usual doing your normal daily activities? \"  (e.g., better, same, worse; self-care, school, work, interactions)      Patient worried about insurance and medication refills and memory loss    8. SUPPORT: \"Who is with you now? \" \"Who do you live with?\" \"Do you have family or friends nearby who you can talk to?\"       Sister who is a nurse    9. THERAPIST: \"Do you have a counselor or therapist? Name? \"      Does not have a therapist or counselor currently    10. STRESSORS: \"Has there been any new stress or recent changes in your life? \"        Paying bills, worries about memory loss, anxiety of loss of brother and mother. 11. CAFFEINE ABUSE: \"Do you drink caffeinated beverages, and how much each day? \" (e.g., coffee, tea, ranjan)        Denies    12. SUBSTANCE ABUSE: \"Do you use any illegal drugs or alcohol? \"        Denies    13. OTHER SYMPTOMS: \"Do you have any other physical symptoms right now? \" (e.g., chest pain, palpitations, difficulty breathing, fever)      Denies    14. PREGNANCY: \"Is there any chance you are pregnant? \" \"When was your last menstrual period? \"        N/A    Protocols used: ANXIETY AND PANIC ATTACK-ADULT-[place]    Patient called pre-service center [facility] with red flag complaint. Spoke with Uzair Hope. Brief description of triage: See above. Triage indicates for patient to to be seen in the next three days. Care advice provided, patient verbalizes understanding; denies any other questions or concerns; instructed to call back for any new or worsening symptoms. Writer provided warm transfer to Uzair Hope at [facility] for appointment scheduling. Attention Provider: Thank you for allowing me to participate in the care of your patient. The patient was connected to triage in response to information provided to the [facility]. Please do not respond through this encounter as the response is not directed to a shared pool. 36.7

## 2025-07-09 ENCOUNTER — OFFICE VISIT (OUTPATIENT)
Dept: INTERNAL MEDICINE CLINIC | Age: 89
End: 2025-07-09

## 2025-07-09 VITALS
HEART RATE: 60 BPM | OXYGEN SATURATION: 100 % | DIASTOLIC BLOOD PRESSURE: 80 MMHG | SYSTOLIC BLOOD PRESSURE: 130 MMHG | BODY MASS INDEX: 20.5 KG/M2 | TEMPERATURE: 98.3 F | WEIGHT: 127 LBS

## 2025-07-09 DIAGNOSIS — F33.1 MODERATE EPISODE OF RECURRENT MAJOR DEPRESSIVE DISORDER (HCC): ICD-10-CM

## 2025-07-09 DIAGNOSIS — M81.8 OTHER OSTEOPOROSIS, UNSPECIFIED PATHOLOGICAL FRACTURE PRESENCE: ICD-10-CM

## 2025-07-09 DIAGNOSIS — G30.1 LATE ONSET ALZHEIMER'S DISEASE WITHOUT BEHAVIORAL DISTURBANCE (HCC): ICD-10-CM

## 2025-07-09 DIAGNOSIS — F02.80 LATE ONSET ALZHEIMER'S DISEASE WITHOUT BEHAVIORAL DISTURBANCE (HCC): ICD-10-CM

## 2025-07-09 DIAGNOSIS — E78.2 MIXED HYPERLIPIDEMIA: ICD-10-CM

## 2025-07-09 DIAGNOSIS — R42 DIZZINESS: ICD-10-CM

## 2025-07-09 DIAGNOSIS — I10 PRIMARY HYPERTENSION: ICD-10-CM

## 2025-07-09 DIAGNOSIS — R22.32 LOCALIZED SWELLING, MASS, OR LUMP OF LEFT UPPER EXTREMITY: ICD-10-CM

## 2025-07-09 DIAGNOSIS — H61.22 IMPACTED CERUMEN OF LEFT EAR: ICD-10-CM

## 2025-07-09 DIAGNOSIS — D69.6 THROMBOCYTOPENIA: ICD-10-CM

## 2025-07-09 DIAGNOSIS — R22.32 LOCALIZED SWELLING, MASS, OR LUMP OF LEFT UPPER EXTREMITY: Primary | ICD-10-CM

## 2025-07-09 DIAGNOSIS — E55.9 VITAMIN D DEFICIENCY: ICD-10-CM

## 2025-07-09 RX ORDER — MECLIZINE HCL 12.5 MG 12.5 MG/1
12.5 TABLET ORAL 3 TIMES DAILY PRN
Qty: 30 TABLET | Refills: 0 | Status: SHIPPED | OUTPATIENT
Start: 2025-07-09

## 2025-07-09 RX ORDER — ALENDRONATE SODIUM 70 MG/1
70 TABLET ORAL
Qty: 13 TABLET | Refills: 0 | Status: SHIPPED | OUTPATIENT
Start: 2025-07-09

## 2025-07-09 SDOH — ECONOMIC STABILITY: FOOD INSECURITY: WITHIN THE PAST 12 MONTHS, YOU WORRIED THAT YOUR FOOD WOULD RUN OUT BEFORE YOU GOT MONEY TO BUY MORE.: NEVER TRUE

## 2025-07-09 SDOH — ECONOMIC STABILITY: FOOD INSECURITY: WITHIN THE PAST 12 MONTHS, THE FOOD YOU BOUGHT JUST DIDN'T LAST AND YOU DIDN'T HAVE MONEY TO GET MORE.: NEVER TRUE

## 2025-07-09 ASSESSMENT — PATIENT HEALTH QUESTIONNAIRE - PHQ9
7. TROUBLE CONCENTRATING ON THINGS, SUCH AS READING THE NEWSPAPER OR WATCHING TELEVISION: SEVERAL DAYS
1. LITTLE INTEREST OR PLEASURE IN DOING THINGS: MORE THAN HALF THE DAYS
5. POOR APPETITE OR OVEREATING: SEVERAL DAYS
SUM OF ALL RESPONSES TO PHQ QUESTIONS 1-9: 9
2. FEELING DOWN, DEPRESSED OR HOPELESS: SEVERAL DAYS
6. FEELING BAD ABOUT YOURSELF - OR THAT YOU ARE A FAILURE OR HAVE LET YOURSELF OR YOUR FAMILY DOWN: SEVERAL DAYS
8. MOVING OR SPEAKING SO SLOWLY THAT OTHER PEOPLE COULD HAVE NOTICED. OR THE OPPOSITE, BEING SO FIGETY OR RESTLESS THAT YOU HAVE BEEN MOVING AROUND A LOT MORE THAN USUAL: SEVERAL DAYS
9. THOUGHTS THAT YOU WOULD BE BETTER OFF DEAD, OR OF HURTING YOURSELF: NOT AT ALL
SUM OF ALL RESPONSES TO PHQ QUESTIONS 1-9: 9
10. IF YOU CHECKED OFF ANY PROBLEMS, HOW DIFFICULT HAVE THESE PROBLEMS MADE IT FOR YOU TO DO YOUR WORK, TAKE CARE OF THINGS AT HOME, OR GET ALONG WITH OTHER PEOPLE: SOMEWHAT DIFFICULT
3. TROUBLE FALLING OR STAYING ASLEEP: SEVERAL DAYS
4. FEELING TIRED OR HAVING LITTLE ENERGY: SEVERAL DAYS

## 2025-07-09 NOTE — PATIENT INSTRUCTIONS
TAKE MED AS ADVISED    DIET/ EXERCISE.    FOLLOW UP WITHIN 3 MONTHS / AS NEEDED    FOLLOW UP FOR FASTING LABS, SURGERY    [facility] Laboratory Locations - No appointment necessary.  ? indicates the location is open Saturdays in addition to Monday through Friday.   Call your preferred location for test preparation, business hours and other information you need.   [facility] accepts all insurances.  CENTRAL  EAST  [place]    ? [place]   [address]   [address]   [place] [address]    Ph: [phone]  [facility]   [address] [place] [address]    Ph: [phone]   ? [facility]   [address]    [place] [address]    Ph: [phone]     [facility]   [address] [place] [address]    Ph: [phone] ? [place]   [address] [place] [address]   Ph: [phone]  ? [facility]   [address] [place] [address]    Ph: [phone]      [place]   [address] [place] [address]   Ph: [phone]     [facility]  [address] [place] [address]   Ph: [phone]    [place]    ? [facility]   [address] [place] [address]    Ph: [phone]  [facility]   [address] [place] [address]   Ph: [phone]  [place]   [address]   [facility], [address]    PH: [phone]    [facility]   [address] [place] [address]    Ph: [phone]  [place]  [address] [place] [address]  Ph: [phone]  [facility]   [address] [place] [address]    Ph: [phone]

## 2025-07-09 NOTE — PROGRESS NOTES
SUBJECTIVE:  Mala Vann is a 89 y.o. female HERE FOR   Chief Complaint   Patient presents with    Dizziness     LIGHT HEADED    Mass     LEFT ARM; NO PAIN        PT HERE FOR EVAL     LAST SEEN  5/24  PT HERE WITH SON AND  SISTER     C/O LUMP ARM - LT. ? PAST 1 MONTH.  ? PAIN. NO KNOWN TRAUMA  C/O DIZZINESS -  ?  INCREASED. ? NO VERTIGO    OSTEOPOROSIS - PREVIOUS DEXA SCAN D/W PT AND FAMILY  DEMENTIA -  ? MED COMPLIANCE. + MEMORY CHANGES . PT PRESENTLY IN ECF  HTN -  ? TAKING MEDS.  BP NOTED. ? DIET / EXERCISE COMPLIANCE. NO HEADACHE , = DIZZINESS.  HLP - ? MD COMPLIANCE. + EXERCISE / DIET  COMPLIANCE .  NO MUSCLE CRAMPS / NO MUSCLE ACHES.  LAB D/W PT  MAJOR DEPRESSION - ? TAKING MED. NO CRYING SPELLS PER PT.  NO INSOMNIA . DENIES SUICIDAL / NO HOMICIDAL THOUGHTS / IDEATION  THROMBOCYTOPENIA - CHRONIC.   OCC BRUISING.   VIT D DEF -   ? MED COMPLIANCE. PREVIOUS LABS D/W PT        DENIES CP, No SOB, No PALPITATIONS, No COUGH, NO F/C  No ABD PAIN, NO NAUSEA, NO VOMITING, No DIARRHEA, No CONSTIPATION, No MELENA, No HEMATOCHEZIA  No DYSURIA,  NO URI. FREQ, No URGENCY, No HEMATURIA    PMH: REVIEWED AND UPDATED TODAY    PSH: REVIEWED AND UPDATED TODAY    SOCIAL HX: REVIEWED AND UPDATED TODAY    FAMILY HX: REVIEWED AND UPDATED TODAY    ALLERGY:  Nabumetone    MEDS: REVIEWED  Prior to Visit Medications    Medication Sig Taking? Authorizing Provider   escitalopram (LEXAPRO) 20 MG tablet TAKE 1 TABLET BY MOUTH DAILY Yes Sarina Mckeon MD   amLODIPine (NORVASC) 5 MG tablet TAKE 1 TABLET BY MOUTH DAILY Yes Sarina Mckeon MD   memantine ER (NAMENDA XR) 21 MG CP24 extended release capsule TAKE 1 CAPSULE BY MOUTH DAILY Yes Sarina Mckeon MD   rosuvastatin (CRESTOR) 40 MG tablet TAKE ONE TABLET BY MOUTH ONCE NIGHTLY Yes Sarina Mckeon MD   telmisartan (MICARDIS) 40 MG tablet TAKE 1 TABLET BY MOUTH DAILY Yes Sarina Mckeon MD   donepezil (ARICEPT) 10 MG tablet Take 1 tablet by mouth nightly Yes Sarina Mckeon

## 2025-07-10 LAB
25(OH)D3 SERPL-MCNC: 34.7 NG/ML
ALBUMIN SERPL-MCNC: 4 G/DL (ref 3.4–5)
ALBUMIN/GLOB SERPL: 1.8 {RATIO} (ref 1.1–2.2)
ALP SERPL-CCNC: 89 U/L (ref 40–129)
ALT SERPL-CCNC: 20 U/L (ref 10–40)
ANION GAP SERPL CALCULATED.3IONS-SCNC: 10 MMOL/L (ref 3–16)
AST SERPL-CCNC: 30 U/L (ref 15–37)
BASOPHILS # BLD: 0 K/UL (ref 0–0.2)
BASOPHILS NFR BLD: 0.4 %
BILIRUB SERPL-MCNC: 0.7 MG/DL (ref 0–1)
BUN SERPL-MCNC: 13 MG/DL (ref 7–20)
CALCIUM SERPL-MCNC: 9.2 MG/DL (ref 8.3–10.6)
CHLORIDE SERPL-SCNC: 107 MMOL/L (ref 99–110)
CHOLEST SERPL-MCNC: 132 MG/DL (ref 0–199)
CO2 SERPL-SCNC: 26 MMOL/L (ref 21–32)
CREAT SERPL-MCNC: 1 MG/DL (ref 0.6–1.2)
DEPRECATED RDW RBC AUTO: 14.9 % (ref 12.4–15.4)
EOSINOPHIL # BLD: 0.1 K/UL (ref 0–0.6)
EOSINOPHIL NFR BLD: 2.2 %
EST. AVERAGE GLUCOSE BLD GHB EST-MCNC: 93.9 MG/DL
GFR SERPLBLD CREATININE-BSD FMLA CKD-EPI: 54 ML/MIN/{1.73_M2}
GLUCOSE SERPL-MCNC: 86 MG/DL (ref 70–99)
HBA1C MFR BLD: 4.9 %
HCT VFR BLD AUTO: 38.3 % (ref 36–48)
HDLC SERPL-MCNC: 59 MG/DL (ref 40–60)
HGB BLD-MCNC: 12 G/DL (ref 12–16)
LDLC SERPL CALC-MCNC: 58 MG/DL
LYMPHOCYTES # BLD: 1.1 K/UL (ref 1–5.1)
LYMPHOCYTES NFR BLD: 20.4 %
MCH RBC QN AUTO: 26.7 PG (ref 26–34)
MCHC RBC AUTO-ENTMCNC: 31.2 G/DL (ref 31–36)
MCV RBC AUTO: 85.4 FL (ref 80–100)
MONOCYTES # BLD: 0.4 K/UL (ref 0–1.3)
MONOCYTES NFR BLD: 7.4 %
NEUTROPHILS # BLD: 3.9 K/UL (ref 1.7–7.7)
NEUTROPHILS NFR BLD: 69.6 %
PLATELET # BLD AUTO: 122 K/UL (ref 135–450)
PMV BLD AUTO: 12 FL (ref 5–10.5)
POTASSIUM SERPL-SCNC: 4.4 MMOL/L (ref 3.5–5.1)
PROT SERPL-MCNC: 6.2 G/DL (ref 6.4–8.2)
RBC # BLD AUTO: 4.49 M/UL (ref 4–5.2)
SODIUM SERPL-SCNC: 143 MMOL/L (ref 136–145)
TRIGL SERPL-MCNC: 75 MG/DL (ref 0–150)
TSH SERPL DL<=0.005 MIU/L-ACNC: 0.78 UIU/ML (ref 0.27–4.2)
VLDLC SERPL CALC-MCNC: 15 MG/DL
WBC # BLD AUTO: 5.6 K/UL (ref 4–11)

## 2025-07-25 ENCOUNTER — OFFICE VISIT (OUTPATIENT)
Dept: SURGERY | Age: 89
End: 2025-07-25
Payer: MEDICARE

## 2025-07-25 VITALS
HEART RATE: 67 BPM | BODY MASS INDEX: 20.01 KG/M2 | WEIGHT: 124 LBS | SYSTOLIC BLOOD PRESSURE: 181 MMHG | DIASTOLIC BLOOD PRESSURE: 86 MMHG

## 2025-07-25 DIAGNOSIS — M79.89 SOFT TISSUE MASS: Primary | ICD-10-CM

## 2025-07-25 PROCEDURE — 1159F MED LIST DOCD IN RCRD: CPT | Performed by: SURGERY

## 2025-07-25 PROCEDURE — G8427 DOCREV CUR MEDS BY ELIG CLIN: HCPCS | Performed by: SURGERY

## 2025-07-25 PROCEDURE — 1036F TOBACCO NON-USER: CPT | Performed by: SURGERY

## 2025-07-25 PROCEDURE — 1090F PRES/ABSN URINE INCON ASSESS: CPT | Performed by: SURGERY

## 2025-07-25 PROCEDURE — 1123F ACP DISCUSS/DSCN MKR DOCD: CPT | Performed by: SURGERY

## 2025-07-25 PROCEDURE — 99203 OFFICE O/P NEW LOW 30 MIN: CPT | Performed by: SURGERY

## 2025-07-25 PROCEDURE — G8420 CALC BMI NORM PARAMETERS: HCPCS | Performed by: SURGERY

## 2025-07-25 NOTE — PROGRESS NOTES
PATIENT NAME: Mala Vann     YOB: 1935     TODAY'S DATE: 7/25/2025    Reason for Visit:  L arm mass    Requesting Physician:  Dr. Mckeon    HISTORY OF PRESENT ILLNESS:              The patient is a 89 y.o. female with a PMHx as delineated below who presents with a Left arm mass that was noticed by her caregiver 8 weeks ago. She denies pain. This mass has not changed in size. She denies fevers, chills or skin changes per her caregiver.    Chief Complaint   Patient presents with    New Patient     Localized swelling, mass, or lump of left upper extremity         REVIEW OF SYSTEMS:  CONSTITUTIONAL:  negative  HEENT:  negative  RESPIRATORY:  negative  CARDIOVASCULAR:  negative  GASTROINTESTINAL:  negative   GENITOURINARY:  negative  HEMATOLOGIC/LYMPHATIC:  negative  MUSCULOSKELETAL: negative  NEUROLOGICAL:  negative    PMH  Past Medical History:   Diagnosis Date    Allergic rhinitis     Depression     DJD (degenerative joint disease)     Hyperlipidemia     Hypertension     Thrombocytopenia        PSH  Past Surgical History:   Procedure Laterality Date    BREAST LUMPECTOMY      RT - BENIGN    CATARACT REMOVAL WITH IMPLANT      no implant per pt     HEMORRHOID SURGERY      HYSTERECTOMY (CERVIX STATUS UNKNOWN)         Social History  Social History     Socioeconomic History    Marital status: Single     Spouse name: Not on file    Number of children: Not on file    Years of education: Not on file    Highest education level: Not on file   Occupational History    Not on file   Tobacco Use    Smoking status: Never    Smokeless tobacco: Never   Substance and Sexual Activity    Alcohol use: No    Drug use: No    Sexual activity: Not Currently   Other Topics Concern    Not on file   Social History Narrative    Not on file     Social Drivers of Health     Financial Resource Strain: Low Risk  (5/13/2024)    Overall Financial Resource Strain (CARDIA)     Difficulty of Paying Living Expenses: Not hard at all

## 2025-07-28 ENCOUNTER — TELEPHONE (OUTPATIENT)
Dept: SURGERY | Age: 89
End: 2025-07-28

## 2025-07-28 DIAGNOSIS — M79.89 SOFT TISSUE MASS: Primary | ICD-10-CM

## 2025-07-28 NOTE — TELEPHONE ENCOUNTER
Spoke with Son  and informed of order being placed for the US of left upper arm and could call central scheduling if they decided they want to pursue that. Call [phone]

## (undated) DEVICE — VELCLOSE LATEX FREE VELCRO ELASTIC BANDAGE 4INX5YD: Brand: VELCLOSE

## (undated) DEVICE — SHOULDER STABILIZATION KIT,                                    DISPOSABLE 12 PER BOX

## (undated) DEVICE — GLOVE ORANGE PI 7 1/2   MSG9075

## (undated) DEVICE — PROTECTOR ULN NRV PUR FOAM HK LOOP STRP ANATOMICALLY

## (undated) DEVICE — SUTURE VICRYL + SZ 3-0 L18IN CT 1 CR ABSRB VCP838D

## (undated) DEVICE — TRAP FLUID

## (undated) DEVICE — SOLUTION IV 1000ML 0.9% SOD CHL

## (undated) DEVICE — NEPTUNE E-SEP SMOKE EVACUATION PENCIL, COATED, 70MM BLADE, PUSH BUTTON SWITCH: Brand: NEPTUNE E-SEP

## (undated) DEVICE — BANDAGE COMPR M W6INXL10YD WHT BGE VELC E MTRX HK AND LOOP

## (undated) DEVICE — GARMENT,MEDLINE,DVT,INT,CALF,MED, GEN2: Brand: MEDLINE

## (undated) DEVICE — HIP PINNING: Brand: MEDLINE INDUSTRIES, INC.

## (undated) DEVICE — TOWEL,STOP FLAG GOLD N-W: Brand: MEDLINE

## (undated) DEVICE — GLOVE ORANGE PI 8   MSG9080

## (undated) DEVICE — SUTURE VICRYL SZ 2-0 L18IN ABSRB UD CT-1 L36MM 1/2 CIR J839D

## (undated) DEVICE — MAT FLR W32XL58IN

## (undated) DEVICE — SPONGE,LAP,18"X18",DLX,XR,ST,5/PK,40/PK: Brand: MEDLINE

## (undated) DEVICE — 6619 2 PTNT ISO SYS INCISE AREA&LT;(&GT;&&LT;)&GT;P: Brand: STERI-DRAPE™ IOBAN™ 2